# Patient Record
Sex: MALE | Race: BLACK OR AFRICAN AMERICAN | NOT HISPANIC OR LATINO | Employment: OTHER | URBAN - METROPOLITAN AREA
[De-identification: names, ages, dates, MRNs, and addresses within clinical notes are randomized per-mention and may not be internally consistent; named-entity substitution may affect disease eponyms.]

---

## 2017-01-06 ENCOUNTER — HISTORICAL (OUTPATIENT)
Dept: ADMINISTRATIVE | Facility: HOSPITAL | Age: 77
End: 2017-01-06

## 2018-01-17 ENCOUNTER — HISTORICAL (OUTPATIENT)
Dept: LAB | Facility: HOSPITAL | Age: 78
End: 2018-01-17

## 2018-05-17 ENCOUNTER — HISTORICAL (OUTPATIENT)
Dept: LAB | Facility: HOSPITAL | Age: 78
End: 2018-05-17

## 2018-09-25 ENCOUNTER — HISTORICAL (OUTPATIENT)
Dept: CARDIOLOGY | Facility: HOSPITAL | Age: 78
End: 2018-09-25

## 2018-09-25 LAB
ABS NEUT (OLG): 2.3 X10(3)/MCL (ref 2.1–9.2)
APTT PPP: 33.9 SECOND(S) (ref 24.8–36.9)
BASOPHILS # BLD AUTO: 0 X10(3)/MCL (ref 0–0.2)
BASOPHILS NFR BLD AUTO: 1 %
BUN SERPL-MCNC: 23 MG/DL (ref 7–18)
CALCIUM SERPL-MCNC: 9.4 MG/DL (ref 8.5–10.1)
CHLORIDE SERPL-SCNC: 106 MMOL/L (ref 98–107)
CO2 SERPL-SCNC: 26 MMOL/L (ref 21–32)
CREAT SERPL-MCNC: 1.57 MG/DL (ref 0.7–1.3)
CREAT/UREA NIT SERPL: 14.6
EOSINOPHIL # BLD AUTO: 0.4 X10(3)/MCL (ref 0–0.9)
EOSINOPHIL NFR BLD AUTO: 8 %
ERYTHROCYTE [DISTWIDTH] IN BLOOD BY AUTOMATED COUNT: 13.5 % (ref 11.5–17)
GLUCOSE SERPL-MCNC: 81 MG/DL (ref 74–106)
HCT VFR BLD AUTO: 43.3 % (ref 42–52)
HGB BLD-MCNC: 12.9 GM/DL (ref 14–18)
INR PPP: 1.01 (ref 0–1.27)
LYMPHOCYTES # BLD AUTO: 1.6 X10(3)/MCL (ref 0.6–4.6)
LYMPHOCYTES NFR BLD AUTO: 34 %
MCH RBC QN AUTO: 27.2 PG (ref 27–31)
MCHC RBC AUTO-ENTMCNC: 29.8 GM/DL (ref 33–36)
MCV RBC AUTO: 91.2 FL (ref 80–94)
MONOCYTES # BLD AUTO: 0.5 X10(3)/MCL (ref 0.1–1.3)
MONOCYTES NFR BLD AUTO: 10 %
NEUTROPHILS # BLD AUTO: 2.3 X10(3)/MCL (ref 1.4–7.9)
NEUTROPHILS NFR BLD AUTO: 48 %
PLATELET # BLD AUTO: 247 X10(3)/MCL (ref 130–400)
PMV BLD AUTO: 10.3 FL (ref 9.4–12.4)
POTASSIUM SERPL-SCNC: 4.1 MMOL/L (ref 3.5–5.1)
PROTHROMBIN TIME: 13.6 SECOND(S) (ref 12.2–14.7)
RBC # BLD AUTO: 4.75 X10(6)/MCL (ref 4.7–6.1)
SODIUM SERPL-SCNC: 142 MMOL/L (ref 136–145)
WBC # SPEC AUTO: 4.8 X10(3)/MCL (ref 4.5–11.5)

## 2018-12-02 ENCOUNTER — HOSPITAL ENCOUNTER (OUTPATIENT)
Dept: ADMINISTRATIVE | Facility: HOSPITAL | Age: 78
End: 2018-12-03
Attending: INTERNAL MEDICINE | Admitting: INTERNAL MEDICINE

## 2018-12-02 LAB
ABS NEUT (OLG): 1.73 X10(3)/MCL (ref 2.1–9.2)
ALBUMIN SERPL-MCNC: 3.5 GM/DL (ref 3.4–5)
ALBUMIN/GLOB SERPL: 1 {RATIO}
ALP SERPL-CCNC: 113 UNIT/L (ref 50–136)
ALT SERPL-CCNC: 12 UNIT/L (ref 12–78)
APTT PPP: 33.7 SECOND(S) (ref 24.8–36.9)
AST SERPL-CCNC: 15 UNIT/L (ref 15–37)
BASOPHILS # BLD AUTO: 0 X10(3)/MCL (ref 0–0.2)
BASOPHILS NFR BLD AUTO: 0 %
BILIRUB SERPL-MCNC: 0.2 MG/DL (ref 0.2–1)
BILIRUBIN DIRECT+TOT PNL SERPL-MCNC: 0.1 MG/DL (ref 0–0.2)
BILIRUBIN DIRECT+TOT PNL SERPL-MCNC: 0.1 MG/DL (ref 0–0.8)
BNP BLD-MCNC: 19 PG/ML (ref 0–125)
BUN SERPL-MCNC: 33 MG/DL (ref 7–18)
CALCIUM SERPL-MCNC: 8.7 MG/DL (ref 8.5–10.1)
CHLORIDE SERPL-SCNC: 109 MMOL/L (ref 98–107)
CO2 SERPL-SCNC: 22 MMOL/L (ref 21–32)
CREAT SERPL-MCNC: 1.68 MG/DL (ref 0.7–1.3)
EOSINOPHIL # BLD AUTO: 0.2 X10(3)/MCL (ref 0–0.9)
EOSINOPHIL NFR BLD AUTO: 5 %
ERYTHROCYTE [DISTWIDTH] IN BLOOD BY AUTOMATED COUNT: 16.5 % (ref 11.5–17)
GLOBULIN SER-MCNC: 3.6 GM/DL (ref 2.4–3.5)
GLUCOSE SERPL-MCNC: 80 MG/DL (ref 74–106)
HCT VFR BLD AUTO: 35.4 % (ref 42–52)
HGB BLD-MCNC: 10.5 GM/DL (ref 14–18)
INR PPP: 1 (ref 0–1.3)
LYMPHOCYTES # BLD AUTO: 2.3 X10(3)/MCL (ref 0.6–4.6)
LYMPHOCYTES NFR BLD AUTO: 49 %
MCH RBC QN AUTO: 26.4 PG (ref 27–31)
MCHC RBC AUTO-ENTMCNC: 29.7 GM/DL (ref 33–36)
MCV RBC AUTO: 88.9 FL (ref 80–94)
MONOCYTES # BLD AUTO: 0.4 X10(3)/MCL (ref 0.1–1.3)
MONOCYTES NFR BLD AUTO: 8 %
NEUTROPHILS # BLD AUTO: 1.73 X10(3)/MCL (ref 2.1–9.2)
NEUTROPHILS NFR BLD AUTO: 37 %
NRBC BLD AUTO-RTO: 0.4 % (ref 0–0.2)
PLATELET # BLD AUTO: 300 X10(3)/MCL (ref 130–400)
PMV BLD AUTO: 9.7 FL (ref 9.4–12.4)
POC TROPONIN: 0 NG/ML (ref 0–0.08)
POTASSIUM SERPL-SCNC: 4.4 MMOL/L (ref 3.5–5.1)
PROT SERPL-MCNC: 7.1 GM/DL (ref 6.4–8.2)
PROTHROMBIN TIME: 13.7 SECOND(S) (ref 12.2–14.7)
RBC # BLD AUTO: 3.98 X10(6)/MCL (ref 4.7–6.1)
SODIUM SERPL-SCNC: 140 MMOL/L (ref 136–145)
TROPONIN I SERPL-MCNC: <0.02 NG/ML (ref 0.02–0.49)
TROPONIN I SERPL-MCNC: <0.02 NG/ML (ref 0.02–0.49)
WBC # SPEC AUTO: 4.7 X10(3)/MCL (ref 4.5–11.5)

## 2018-12-03 LAB — TROPONIN I SERPL-MCNC: <0.02 NG/ML (ref 0.02–0.49)

## 2019-01-23 LAB — HEMOCCULT SP2 STL QL: NEGATIVE

## 2019-01-24 ENCOUNTER — HISTORICAL (OUTPATIENT)
Dept: LAB | Facility: HOSPITAL | Age: 79
End: 2019-01-24

## 2020-09-04 ENCOUNTER — HOSPITAL ENCOUNTER (OUTPATIENT)
Dept: NUTRITION | Facility: HOSPITAL | Age: 80
End: 2020-09-05
Attending: INTERNAL MEDICINE | Admitting: INTERNAL MEDICINE

## 2020-09-04 LAB
ABS NEUT (OLG): 1.73 X10(3)/MCL (ref 2.1–9.2)
ALBUMIN SERPL-MCNC: 3.9 GM/DL (ref 3.4–4.8)
ALBUMIN/GLOB SERPL: 1.3 RATIO (ref 1.1–2)
ALP SERPL-CCNC: 126 UNIT/L (ref 40–150)
ALT SERPL-CCNC: 9 UNIT/L (ref 0–55)
APPEARANCE, UA: CLEAR
AST SERPL-CCNC: 18 UNIT/L (ref 5–34)
BACTERIA SPEC CULT: ABNORMAL /HPF
BASOPHILS # BLD AUTO: 0 X10(3)/MCL (ref 0–0.2)
BASOPHILS NFR BLD AUTO: 0 %
BILIRUB SERPL-MCNC: 0.6 MG/DL
BILIRUB UR QL STRIP: NEGATIVE
BILIRUBIN DIRECT+TOT PNL SERPL-MCNC: 0.2 MG/DL (ref 0–0.5)
BILIRUBIN DIRECT+TOT PNL SERPL-MCNC: 0.4 MG/DL (ref 0–0.8)
BNP BLD-MCNC: <10 PG/ML
BUN SERPL-MCNC: 39.3 MG/DL (ref 8.4–25.7)
CALCIUM SERPL-MCNC: 9 MG/DL (ref 8.8–10)
CHLORIDE SERPL-SCNC: 107 MMOL/L (ref 98–107)
CK SERPL-CCNC: 182 U/L (ref 30–200)
CO2 SERPL-SCNC: 22 MMOL/L (ref 23–31)
COLOR UR: YELLOW
CREAT SERPL-MCNC: 2.32 MG/DL (ref 0.73–1.18)
EOSINOPHIL # BLD AUTO: 0.1 X10(3)/MCL (ref 0–0.9)
EOSINOPHIL NFR BLD AUTO: 2 %
ERYTHROCYTE [DISTWIDTH] IN BLOOD BY AUTOMATED COUNT: 13.2 % (ref 11.5–17)
GLOBULIN SER-MCNC: 3.1 GM/DL (ref 2.4–3.5)
GLUCOSE (UA): NEGATIVE
GLUCOSE SERPL-MCNC: 78 MG/DL (ref 82–115)
HCT VFR BLD AUTO: 41.8 % (ref 42–52)
HGB BLD-MCNC: 12.7 GM/DL (ref 14–18)
HGB UR QL STRIP: NEGATIVE
KETONES UR QL STRIP: ABNORMAL
LEUKOCYTE ESTERASE UR QL STRIP: NEGATIVE
LYMPHOCYTES # BLD AUTO: 1.7 X10(3)/MCL (ref 0.6–4.6)
LYMPHOCYTES NFR BLD AUTO: 43 %
MCH RBC QN AUTO: 28 PG (ref 27–31)
MCHC RBC AUTO-ENTMCNC: 30.4 GM/DL (ref 33–36)
MCV RBC AUTO: 92.1 FL (ref 80–94)
MONOCYTES # BLD AUTO: 0.4 X10(3)/MCL (ref 0.1–1.3)
MONOCYTES NFR BLD AUTO: 11 %
NEUTROPHILS # BLD AUTO: 1.73 X10(3)/MCL (ref 2.1–9.2)
NEUTROPHILS NFR BLD AUTO: 43 %
NITRITE UR QL STRIP: NEGATIVE
PH UR STRIP: 5 [PH] (ref 5–9)
PLATELET # BLD AUTO: 222 X10(3)/MCL (ref 130–400)
PMV BLD AUTO: 10.8 FL (ref 9.4–12.4)
POTASSIUM SERPL-SCNC: 4.8 MMOL/L (ref 3.5–5.1)
PROT SERPL-MCNC: 7 GM/DL (ref 5.8–7.6)
PROT UR QL STRIP: ABNORMAL
RBC # BLD AUTO: 4.54 X10(6)/MCL (ref 4.7–6.1)
RBC #/AREA URNS HPF: ABNORMAL /[HPF]
SARS-COV-2 RNA RESP QL NAA+PROBE: NOT DETECTED
SODIUM SERPL-SCNC: 139 MMOL/L (ref 136–145)
SP GR UR STRIP: 1.02 (ref 1–1.03)
SQUAMOUS EPITHELIAL, UA: ABNORMAL
TROPONIN I SERPL-MCNC: 0.01 NG/ML (ref 0–0.04)
UROBILINOGEN UR STRIP-ACNC: 1
WBC # SPEC AUTO: 4 X10(3)/MCL (ref 4.5–11.5)
WBC #/AREA URNS HPF: ABNORMAL /HPF

## 2020-09-05 LAB
BUN SERPL-MCNC: 33.2 MG/DL (ref 8.4–25.7)
CALCIUM SERPL-MCNC: 8.3 MG/DL (ref 8.8–10)
CHLORIDE SERPL-SCNC: 108 MMOL/L (ref 98–107)
CO2 SERPL-SCNC: 24 MMOL/L (ref 23–31)
CREAT SERPL-MCNC: 1.63 MG/DL (ref 0.73–1.18)
CREAT/UREA NIT SERPL: 20
GLUCOSE SERPL-MCNC: 92 MG/DL (ref 82–115)
POTASSIUM SERPL-SCNC: 4.8 MMOL/L (ref 3.5–5.1)
SODIUM SERPL-SCNC: 137 MMOL/L (ref 136–145)

## 2021-04-30 ENCOUNTER — HOSPITAL ENCOUNTER (OUTPATIENT)
Dept: NUTRITION | Facility: HOSPITAL | Age: 81
End: 2021-05-01
Attending: INTERNAL MEDICINE | Admitting: INTERNAL MEDICINE

## 2021-04-30 LAB
ABS NEUT (OLG): 2.08 X10(3)/MCL (ref 2.1–9.2)
ALBUMIN SERPL-MCNC: 4 GM/DL (ref 3.4–4.8)
ALBUMIN/GLOB SERPL: 1.3 RATIO (ref 1.1–2)
ALP SERPL-CCNC: 124 UNIT/L (ref 40–150)
ALT SERPL-CCNC: 12 UNIT/L (ref 0–55)
AST SERPL-CCNC: 23 UNIT/L (ref 5–34)
BASOPHILS # BLD AUTO: 0 X10(3)/MCL (ref 0–0.2)
BASOPHILS NFR BLD AUTO: 0 %
BILIRUB SERPL-MCNC: 0.5 MG/DL
BILIRUBIN DIRECT+TOT PNL SERPL-MCNC: 0.2 MG/DL (ref 0–0.8)
BILIRUBIN DIRECT+TOT PNL SERPL-MCNC: 0.3 MG/DL (ref 0–0.5)
BNP BLD-MCNC: 24 PG/ML (ref 0–100)
BUN SERPL-MCNC: 20.8 MG/DL (ref 8.4–25.7)
CALCIUM SERPL-MCNC: 9.2 MG/DL (ref 8.8–10)
CHLORIDE SERPL-SCNC: 111 MMOL/L (ref 98–107)
CO2 SERPL-SCNC: 23 MMOL/L (ref 23–31)
CREAT SERPL-MCNC: 1.46 MG/DL (ref 0.73–1.18)
EOSINOPHIL # BLD AUTO: 0.1 X10(3)/MCL (ref 0–0.9)
EOSINOPHIL NFR BLD AUTO: 2 %
ERYTHROCYTE [DISTWIDTH] IN BLOOD BY AUTOMATED COUNT: 13.2 % (ref 11.5–17)
GLOBULIN SER-MCNC: 3.1 GM/DL (ref 2.4–3.5)
GLUCOSE SERPL-MCNC: 77 MG/DL (ref 82–115)
HCT VFR BLD AUTO: 45.3 % (ref 42–52)
HGB BLD-MCNC: 13.6 GM/DL (ref 14–18)
LYMPHOCYTES # BLD AUTO: 1.8 X10(3)/MCL (ref 0.6–4.6)
LYMPHOCYTES NFR BLD AUTO: 41 %
MCH RBC QN AUTO: 27.8 PG (ref 27–31)
MCHC RBC AUTO-ENTMCNC: 30 GM/DL (ref 33–36)
MCV RBC AUTO: 92.6 FL (ref 80–94)
MONOCYTES # BLD AUTO: 0.4 X10(3)/MCL (ref 0.1–1.3)
MONOCYTES NFR BLD AUTO: 8 %
NEUTROPHILS # BLD AUTO: 2.08 X10(3)/MCL (ref 2.1–9.2)
NEUTROPHILS NFR BLD AUTO: 48 %
PLATELET # BLD AUTO: 198 X10(3)/MCL (ref 130–400)
PMV BLD AUTO: 10.6 FL (ref 9.4–12.4)
POC TROPONIN: 0.06 NG/ML (ref 0–0.08)
POTASSIUM SERPL-SCNC: 4.3 MMOL/L (ref 3.5–5.1)
PROT SERPL-MCNC: 7.1 GM/DL (ref 5.8–7.6)
RBC # BLD AUTO: 4.89 X10(6)/MCL (ref 4.7–6.1)
SARS-COV-2 AG RESP QL IA.RAPID: NEGATIVE
SODIUM SERPL-SCNC: 144 MMOL/L (ref 136–145)
TROPONIN I SERPL-MCNC: 0.02 NG/ML (ref 0–0.04)
WBC # SPEC AUTO: 4.3 X10(3)/MCL (ref 4.5–11.5)

## 2021-05-01 LAB
BUN SERPL-MCNC: 21.1 MG/DL (ref 8.4–25.7)
CALCIUM SERPL-MCNC: 8.8 MG/DL (ref 8.8–10)
CHLORIDE SERPL-SCNC: 107 MMOL/L (ref 98–107)
CK MB SERPL-MCNC: 0.8 NG/ML
CK MB SERPL-MCNC: 1 NG/ML
CK SERPL-CCNC: 130 U/L (ref 30–200)
CK SERPL-CCNC: 131 U/L (ref 30–200)
CO2 SERPL-SCNC: 22 MMOL/L (ref 23–31)
CREAT SERPL-MCNC: 1.43 MG/DL (ref 0.73–1.18)
CREAT/UREA NIT SERPL: 15
GLUCOSE SERPL-MCNC: 57 MG/DL (ref 82–115)
POTASSIUM SERPL-SCNC: 4 MMOL/L (ref 3.5–5.1)
SODIUM SERPL-SCNC: 139 MMOL/L (ref 136–145)
TROPONIN I SERPL-MCNC: 0.01 NG/ML (ref 0–0.04)
TROPONIN I SERPL-MCNC: 0.01 NG/ML (ref 0–0.04)

## 2021-10-28 ENCOUNTER — HOSPITAL ENCOUNTER (OUTPATIENT)
Dept: MEDSURG UNIT | Facility: HOSPITAL | Age: 81
End: 2021-10-30
Attending: INTERNAL MEDICINE | Admitting: INTERNAL MEDICINE

## 2021-10-28 LAB
ABS NEUT (OLG): 1.62 X10(3)/MCL (ref 2.1–9.2)
ALBUMIN SERPL-MCNC: 4 GM/DL (ref 3.4–4.8)
ALBUMIN/GLOB SERPL: 1.4 RATIO (ref 1.1–2)
ALP SERPL-CCNC: 115 UNIT/L (ref 40–150)
ALT SERPL-CCNC: 11 UNIT/L (ref 0–55)
AST SERPL-CCNC: 20 UNIT/L (ref 5–34)
BASOPHILS # BLD AUTO: 0 X10(3)/MCL (ref 0–0.2)
BASOPHILS NFR BLD AUTO: 0 %
BILIRUB SERPL-MCNC: 0.9 MG/DL
BILIRUBIN DIRECT+TOT PNL SERPL-MCNC: 0.3 MG/DL (ref 0–0.5)
BILIRUBIN DIRECT+TOT PNL SERPL-MCNC: 0.6 MG/DL (ref 0–0.8)
BNP BLD-MCNC: 114.1 PG/ML
BUN SERPL-MCNC: 13.1 MG/DL (ref 8.4–25.7)
CALCIUM SERPL-MCNC: 9.3 MG/DL (ref 8.7–10.5)
CHLORIDE SERPL-SCNC: 111 MMOL/L (ref 98–107)
CO2 SERPL-SCNC: 23 MMOL/L (ref 23–31)
CREAT SERPL-MCNC: 1.39 MG/DL (ref 0.73–1.18)
EOSINOPHIL # BLD AUTO: 0.2 X10(3)/MCL (ref 0–0.9)
EOSINOPHIL NFR BLD AUTO: 4 %
ERYTHROCYTE [DISTWIDTH] IN BLOOD BY AUTOMATED COUNT: 14 % (ref 11.5–17)
GLOBULIN SER-MCNC: 2.9 GM/DL (ref 2.4–3.5)
GLUCOSE SERPL-MCNC: 82 MG/DL (ref 82–115)
HCT VFR BLD AUTO: 45.9 % (ref 42–52)
HGB BLD-MCNC: 13.6 GM/DL (ref 14–18)
LYMPHOCYTES # BLD AUTO: 1.8 X10(3)/MCL (ref 0.6–4.6)
LYMPHOCYTES NFR BLD AUTO: 46 %
MCH RBC QN AUTO: 27.7 PG (ref 27–31)
MCHC RBC AUTO-ENTMCNC: 29.6 GM/DL (ref 33–36)
MCV RBC AUTO: 93.5 FL (ref 80–94)
MONOCYTES # BLD AUTO: 0.4 X10(3)/MCL (ref 0.1–1.3)
MONOCYTES NFR BLD AUTO: 10 %
NEUTROPHILS # BLD AUTO: 1.62 X10(3)/MCL (ref 2.1–9.2)
NEUTROPHILS NFR BLD AUTO: 40 %
PLATELET # BLD AUTO: 183 X10(3)/MCL (ref 130–400)
PMV BLD AUTO: 10.3 FL (ref 9.4–12.4)
POC TROPONIN: 0.01 NG/ML (ref 0–0.08)
POTASSIUM SERPL-SCNC: 4.6 MMOL/L (ref 3.5–5.1)
PROT SERPL-MCNC: 6.9 GM/DL (ref 5.8–7.6)
RBC # BLD AUTO: 4.91 X10(6)/MCL (ref 4.7–6.1)
SARS-COV-2 AG RESP QL IA.RAPID: NEGATIVE
SODIUM SERPL-SCNC: 144 MMOL/L (ref 136–145)
TROPONIN I SERPL-MCNC: 0.01 NG/ML (ref 0–0.04)
TROPONIN I SERPL-MCNC: 0.02 NG/ML (ref 0–0.04)
TROPONIN I SERPL-MCNC: 0.02 NG/ML (ref 0–0.04)
TROPONIN I SERPL-MCNC: 0.03 NG/ML (ref 0–0.04)
WBC # SPEC AUTO: 4 X10(3)/MCL (ref 4.5–11.5)

## 2021-10-29 LAB
ABS NEUT (OLG): 1.68 X10(3)/MCL (ref 2.1–9.2)
ALBUMIN SERPL-MCNC: 3.3 GM/DL (ref 3.4–4.8)
ALBUMIN/GLOB SERPL: 1.3 RATIO (ref 1.1–2)
ALP SERPL-CCNC: 97 UNIT/L (ref 40–150)
ALT SERPL-CCNC: 7 UNIT/L (ref 0–55)
AST SERPL-CCNC: 16 UNIT/L (ref 5–34)
BASOPHILS # BLD AUTO: 0 X10(3)/MCL (ref 0–0.2)
BASOPHILS NFR BLD AUTO: 0 %
BILIRUB SERPL-MCNC: 0.7 MG/DL
BILIRUBIN DIRECT+TOT PNL SERPL-MCNC: 0.3 MG/DL (ref 0–0.5)
BILIRUBIN DIRECT+TOT PNL SERPL-MCNC: 0.4 MG/DL (ref 0–0.8)
BUN SERPL-MCNC: 13.4 MG/DL (ref 8.4–25.7)
CALCIUM SERPL-MCNC: 8.6 MG/DL (ref 8.7–10.5)
CHLORIDE SERPL-SCNC: 113 MMOL/L (ref 98–107)
CO2 SERPL-SCNC: 20 MMOL/L (ref 23–31)
CREAT SERPL-MCNC: 1.42 MG/DL (ref 0.73–1.18)
EOSINOPHIL # BLD AUTO: 0.1 X10(3)/MCL (ref 0–0.9)
EOSINOPHIL NFR BLD AUTO: 2 %
ERYTHROCYTE [DISTWIDTH] IN BLOOD BY AUTOMATED COUNT: 14 % (ref 11.5–17)
GLOBULIN SER-MCNC: 2.6 GM/DL (ref 2.4–3.5)
GLUCOSE SERPL-MCNC: 90 MG/DL (ref 82–115)
HCT VFR BLD AUTO: 39.5 % (ref 42–52)
HGB BLD-MCNC: 12.2 GM/DL (ref 14–18)
LYMPHOCYTES # BLD AUTO: 1.1 X10(3)/MCL (ref 0.6–4.6)
LYMPHOCYTES NFR BLD AUTO: 35 %
MCH RBC QN AUTO: 28.5 PG (ref 27–31)
MCHC RBC AUTO-ENTMCNC: 30.9 GM/DL (ref 33–36)
MCV RBC AUTO: 92.3 FL (ref 80–94)
MONOCYTES # BLD AUTO: 0.4 X10(3)/MCL (ref 0.1–1.3)
MONOCYTES NFR BLD AUTO: 11 %
NEUTROPHILS # BLD AUTO: 1.68 X10(3)/MCL (ref 2.1–9.2)
NEUTROPHILS NFR BLD AUTO: 51 %
PLATELET # BLD AUTO: 132 X10(3)/MCL (ref 130–400)
PMV BLD AUTO: 11.3 FL (ref 9.4–12.4)
POTASSIUM SERPL-SCNC: 4 MMOL/L (ref 3.5–5.1)
PROT SERPL-MCNC: 5.9 GM/DL (ref 5.8–7.6)
RBC # BLD AUTO: 4.28 X10(6)/MCL (ref 4.7–6.1)
RESTICK: NORMAL
SODIUM SERPL-SCNC: 139 MMOL/L (ref 136–145)
TROPONIN I SERPL-MCNC: 0.03 NG/ML (ref 0–0.04)
WBC # SPEC AUTO: 3.3 X10(3)/MCL (ref 4.5–11.5)

## 2021-12-03 LAB — EST CREAT CLEARANCE SER (OHS): 47.05 ML/MIN

## 2022-01-28 ENCOUNTER — HISTORICAL (OUTPATIENT)
Dept: ADMINISTRATIVE | Facility: HOSPITAL | Age: 82
End: 2022-01-28

## 2022-02-05 ENCOUNTER — HISTORICAL (OUTPATIENT)
Dept: ADMINISTRATIVE | Facility: HOSPITAL | Age: 82
End: 2022-02-05

## 2022-04-29 NOTE — ED PROVIDER NOTES
Patient:   Cony Roman            MRN: [id number]            FIN: [id number]               Age:   80 years     Sex:  Male     :  1940   Associated Diagnoses:   Generalized weakness; Fasting hypoglycemia; Acute on chronic renal failure   Author:   Yanci Lewis MD      Basic Information   Time seen: Date & time 2020 13:18:00.   History source: Patient, daughter.   Arrival mode: Private vehicle, walking.   History limitation: None.   Additional information: Chief Complaint from Nursing Triage Note : Chief Complaint   2020 13:15 CDT       Chief Complaint           pt reports pov c/o generalized weakness x4 days. pt reports working in yard monday, then no energy the following day. went to urgent care yest, bld work done. called today to come to ER for elevated D-dimer. denies pain, SOB, N/V/D, fever  .      History of Present Illness   The patient presents with SOrT note: Elderly male with generalized weakness seen by primary care physician with outpatient lab work yesterday notified of elevated d-dimer sent to emergency department for further evaluation today.  DAYTON BARNEY and I, Dr. Lewis, assumed care of this patient at 1650.    79 y/o male with hx of HTN and HLD presents to ED with complaints of generalized weakness x4 days. Patient reports working in his yard on Monday and since has been fatigued. He was seen by an Urgent Care or PCP who ordered routine labs as well as a d-dimer that was elevated therefore he was diverted to the ED. Pt denies SOB, chest pain, nausea, vomiting, diarrhea, and a fever.   .  The onset was 4  days ago.  The course/duration of symptoms is constant.  Location: generalized. The character of symptoms is weakness.  The degree at onset was moderate.  The degree at present is moderate.  Risk factors consist of hypertension and HLD.  Therapy today: doctor's office visit.  Associated symptoms: denies chest pain, denies nausea, denies vomiting, denies shortness  of breath and denies fever.  Additional history: none.     The patient presents with abnormal lab test.        Review of Systems   Constitutional symptoms:  No fever, no chills, no sweats   Skin symptoms:  No rash, no petechiae.    Eye symptoms:  Vision unchanged, no pain, no discharge, no icterus, no diplopia, no blurred vision, no blindness.    ENMT symptoms:  No ear pain, no sore throat, no nasal congestion, no sinus pain.    Respiratory symptoms:  No shortness of breath, no orthopnea, no cough, no hemoptysis, no stridor, no wheezing.    Cardiovascular symptoms:  No chest pain, no palpitations, no syncope, no diaphoresis, no peripheral edema.    Gastrointestinal symptoms:  No abdominal pain, no nausea, no vomiting, no diarrhea, no constipation, no rectal bleeding, no rectal pain.    Genitourinary symptoms:  No dysuria, no hematuria.    Musculoskeletal symptoms:  No back pain, no Muscle pain.    Neurologic symptoms:  No headache, no dizziness, no altered level of consciousness, no numbness, no tingling   Psychiatric symptoms:  Negative except as documented in HPI.   Endocrine symptoms:  Negative except as documented in HPI.   Hematologic/Lymphatic symptoms:  Negative except as documented in HPI      Health Status   Allergies: No known allergies.   Medications:  (Selected)   Prescriptions  Prescribed  isosorbide MONOnitrate 60 mg oral tablet, Extended Release: 60 mg = 1 tab(s), Oral, qAM, # 30 tab(s), 5 Refill(s)  lisinopril 20 mg oral tablet: 20 mg = 1 tab(s), Oral, Daily, # 30 tab(s), 5 Refill(s)  Documented Medications  Documented  ATORVASTATIN 20 MG TABLET: 20 mg = 1 tab(s), Oral, Daily  Aspir 81 oral delayed release tablet: 81 mg = 1 tab(s), Oral, Daily, # 30 tab(s), 0 Refill(s)  METOPROLOL SUCC ER 25 MG TAB: 25 mg = 1 tab(s), Oral, Daily  NITROGLYCERIN 0.4 MG TABLET SL: 1 tab, SL, q5min, PRN PRN chest pain, x3 doses.      Past Medical/ Family/ Social History   Medical history:    Resolved  Tobacco user  ([id number]): Onset in 1952 at 12 years.  Resolved on 12/1/2017 at 77 years.  Hernia (553.9):  Resolved.  HTN (hypertension) (401.9):  Resolved.  Hyperlipidemia ([id number]):  Resolved.  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  Comments:  2/2/2016 CST 8:07 CST - Jasmine SINGER , Massiel Gilbert  right.   Surgical history:    Abdominal Aortic Aneurysmectomy (.) on 9/27/2018 at 78 Years.  Comments:  9/27/2018 12:16 CDT - Rm SINGER, Jaxon ELY  auto-populated from documented surgical case  Nephrectomy (Right) on 2/2/2016 at 75 Years.  Comments:  2/2/2016 13:46 CST - Kwasi SINGER, Heather RENE  auto-populated from documented surgical case  Hernia repair ([id number]).  Lumpectomy ([id number]).  Colonoscopy ([id number])..   Family history:    No family history items have been selected or recorded..   Social history: Alcohol use: former, Tobacco use: Former.      Physical Examination               Vital Signs   Vital Signs   9/4/2020 13:15 CDT       Temperature Oral          36.7 DegC                             Temperature Oral (calculated)             98.06 DegF                             Peripheral Pulse Rate     57 bpm  LOW                             Respiratory Rate          19 br/min                             SpO2                      99 %                             Oxygen Therapy            Room air                             Systolic Blood Pressure   118 mmHg                             Diastolic Blood Pressure  64 mmHg  .               Per nurse's notes.   Basic Oxygen Information   9/4/2020 16:53 CDT       SpO2                      100 %                             Oxygen Therapy            Room air  .   General:  Alert, no acute distress, generalized weakness.    Neurological:  Alert and oriented to person, place, time, and situation, No focal neurological deficit observed   Skin:  Warm, dry, intact   Head:  Normocephalic, atraumatic.    Neck:  Supple, trachea midline, no tenderness.    Eye:   Pupils are equal, round and reactive to light, extraocular movements are intact   Respiratory:  Lungs are clear to auscultation, respirations are non-labored   Cardiovascular:  Regular rate and rhythm, No murmur, Normal peripheral perfusion.    Back:  Nontender, Normal range of motion   Musculoskeletal:  Normal ROM, normal strength.    Gastrointestinal:  Soft, Nontender   Psychiatric:  Cooperative, appropriate mood & affect.       Medical Decision Making   Differential Diagnosis:  Electrolyte imbalance, hypokalemia, hyponatremia, drug toxicity, renal insufficiency, renal failure, metabolic acidosis, dysrhythmia, gastrointestinal bleed, atrial fibrillation, lab error.    Differential Diagnosis:  Abdominal pain, acute myocardial infarction, dehydration, electrolyte imbalance, weakness, pneumonia, urinary tract infection, viral syndrome, medication reaction, heat cramps.    Rationale:  pt hwo has been weak for about 1 week, saw pcp and had routine labs performed, was notified of elevated d dimer. has no evidence to suggest vte, is not tachycardic, tachypneic, hypoxic or complaining of chest pain or shortness of breath. no calf pain or swelling, possibly nonspecific elevation vs elevation related to possible covid-19. is in acute renal failure which would preclude cta for pe. will order venous us bilateral le to r/odvt.   Documents reviewed:  Emergency department nurses' notes.   Orders  Launch Order Profile (Selected)   Inpatient Orders  Ordered  Blood Glucose Monitoring POC: 09/04/20 15:26:00 CDT, Once-Unscheduled, 09/04/20 15:26:00 CDT  NS 1,000 mL: 1,000 mL, 1,000 mL, IV, 500 mL/hr, start date 09/04/20 17:07:00 CDT  Patient Isolation: 09/04/20 13:20:13 CDT, Contact Precautions, Constant Indicator  Patient Isolation: 09/04/20 13:20:13 CDT, Droplet Precautions, Constant Indicator  Ordered (Dispatched)  Urinalysis with Reflex: Stat collect, Urine, 09/04/20 13:19:00 CDT, Stop date 09/04/20 13:20:00 CDT, Nurse collect,  Print Label By Order Location  Ordered (In-Lab)  COVID-19 PCR - Inpatient only [facility]: Now collect, Nasopharyngeal Swab, 09/04/20 13:19:00 CDT, Stop date 09/04/20 13:20:00 CDT, Nurse collect  Completed  Automated Diff: NOW collect, 09/04/20 13:27:00 CDT, Blood, Collected, Stop date 09/04/20 13:27:00 CDT, Lab Collect, Print Label By Order Location, 09/04/20 13:19:00 CDT  BNP: STAT collect, 09/04/20 13:27:58 CDT, BLOOD, Collected, Stop date 09/04/20 13:27:00 CDT, Lab Collect  CBC w/ Auto Diff: NOW collect, 09/04/20 13:27:58 CDT, BLOOD, Collected, Stop date 09/04/20 13:27:00 CDT, Lab Collect  CMP: STAT collect, 09/04/20 13:27:58 CDT, BLOOD, Collected, Stop date 09/04/20 13:27:00 CDT, Lab Collect  EKG Adult 12 Lead: 09/04/20 13:18:00 CDT, Stat, Once, 09/04/20 13:18:00 CDT, Patient Bed, Patient Has IV, Standard Precautions, -1, -1, 09/04/20 13:18:00 CDT  Estimated Glomerular Filtration Rate: STAT collect, 09/04/20 13:27:00 CDT, Blood, Collected, Stop date 09/04/20 13:27:00 CDT, Lab Collect, Print Label By Order Location, 09/04/20 13:19:00 CDT  Troponin-I: STAT collect, 09/04/20 13:27:58 CDT, BLOOD, Collected, Stop date 09/04/20 13:27:00 CDT, Lab Collect  XR Chest 2 Views: Stat, 09/04/20 13:19:00 CDT, Other (please specify), elevated D-dimer, None, Patient Bed, Patient Has IV?, Rad Type, Not Scheduled, 09/04/20 13:19:00 CDT.   Electrocardiogram:  Time 9/4/2020 13:22:00, rate 57, sinus bradycardia with nonspecific st abnormality consistent with likely early repolarization.    Results review:  Lab results : Lab View   9/4/2020 13:27 CDT       Sodium Lvl                139 mmol/L                             Potassium Lvl             4.8 mmol/L                             Chloride                  107 mmol/L                             CO2                       22 mmol/L  LOW                             Calcium Lvl               9.0 mg/dL                             Glucose Lvl               78 mg/dL  LOW                              BUN                       39.3 mg/dL  HI                             Creatinine                2.32 mg/dL  HI                             eGFR-AA                   35  NA                             eGFR-GRAHAM                  29  NA                             Bili Total                0.6 mg/dL                             Bili Direct               0.2 mg/dL                             Bili Indirect             0.40 mg/dL                             AST                       18 unit/L                             ALT                       9 unit/L                             Alk Phos                  126 unit/L                             Total Protein             7.0 gm/dL                             Albumin Lvl               3.9 gm/dL                             Globulin                  3.1 gm/dL                             A/G Ratio                 1.3 ratio                             BNP                       <10.0 pg/mL                             Troponin-I                0.011 ng/mL                             WBC                       4.0 x10(3)/mcL  LOW                             RBC                       4.54 x10(6)/mcL  LOW                             Hgb                       12.7 gm/dL  LOW                             Hct                       41.8 %  LOW                             Platelet                  222 x10(3)/mcL                             MCV                       92.1 fL                             MCH                       28.0 pg                             MCHC                      30.4 gm/dL  LOW                             RDW                       13.2 %                             MPV                       10.8 fL                             Abs Neut                  1.73 x10(3)/mcL  LOW                             Neutro Auto               43 %  NA                             Lymph Auto                43 %  NA                             Mono Auto                 11 %  NA                              Eos Auto                  2 %  NA                             Abs Eos                   0.1 x10(3)/mcL                             Basophil Auto             0 %  NA                             Abs Neutro                1.73 x10(3)/mcL  LOW                             Abs Lymph                 1.7 x10(3)/mcL                             Abs Mono                  0.4 x10(3)/mcL                             Abs Baso                  0.0 x10(3)/mcL  .   Radiology results:  Rad Results (ST)  < 12 hrs   Accession: [id number]  Order: XR Chest 2 Views  Report Dt/Tm: 09/04/2020 13:57  Report:   Clinical History  Elevated d-dimer     Technique  2 views of the chest.     Comparison  No prior imaging available for comparison.     Findings  Lungs are clear with no visualized focal airspace opacity.  The trachea appears midline.  The cardiomediastinal silhouette is within normal limits.  There is no evidence of pneumothorax or pleural effusion.  Visualized abdomen, soft tissues, and osseous structures are  unremarkable.     Impression  No acute cardiopulmonary process.      .   Notes:  us negative for dvt bilaterally.      Reexamination/ Reevaluation   Time: 9/4/2020 17:53:00 .   Notes: + orthostatics, anorexia with hypoglycemia, acute on chronic renal failure. will give ivf, pt does not feel well enough to go home and is not eating or drinking well enough at this point to stress po hydration and recheck. suspect possible covid-19 infection given reported elevated d dimer, anorexia, fatigue and leukopenia. does not have respiratory or gi symptoms it seems. covid is pending.      Impression and Plan   Diagnosis   Generalized weakness (EMY44-AR R53.1)   Fasting hypoglycemia (QEE86-WT E16.1)   Acute on chronic renal failure (AQQ43-QP N17.9)      Calls-Consults   -  9/4/2020 19:05:00 , Zulema Huynh    Plan   Condition: Improved.    Disposition: Admit time  9/4/2020 19:05:00, Place in Observation  Telemetry Unit.    Counseled: Patient, Regarding diagnosis, Regarding diagnostic results, Regarding treatment plan, Patient indicated understanding of instructions.    Notes: I, Heena Corona, acted solely as a scribe for and in the presence of Dr. Lewis who performed the service. .       Addendum   I, Yanci Lewis MD, performed the history, physical examination, MDM and procedures as above and agree with the scribe's documentation

## 2022-04-29 NOTE — DISCHARGE SUMMARY
Patient:   Cony Roman            MRN: [id number]            FIN: [id number]               Age:   80 years     Sex:  Male     :  1940   Associated Diagnoses:   None   Author:   Aurora Ellis      Please see H&P from today for discharge summary

## 2022-04-29 NOTE — ED PROVIDER NOTES
"   Patient:   Cony Roman            MRN: [id number]            FIN: [id number]               Age:   78 years     Sex:  Male     :  1940   Associated Diagnoses:   Chest pain   Author:   Foster SYED, Matt Borjas      Basic Information   Time seen: Date & time 2018 06:16:00.   History source: Patient.   Arrival mode: Private vehicle.   History limitation: None.   Additional information: Patient's physician(s): Doreen KURTZ MD, Chucky BRIGGS, Nancy SYED, Mynor ALONSO, Chief Complaint from Nursing Triage Note : Chief Complaint   2018 4:22 CST       Chief Complaint           c/o L chest pain and SOB since . denies n/v/d, fever, cough  .      History of Present Illness   The patient presents with chest pain and 77 yo male with Hx of HTN and HLD presents with chest pain for 7 hours. Pt states his tight "heart pain", localized to the left side of his chest, came on last night at 11:00 PM while he was "helping [his] old lady with her medications". The pain lasted 1 hour and the Pt then went to sleep. He awoke this morning with mild chest pain and decided to drive himself to the ED here. He did not have chest pain while driving. Pt admits to SOB for the couple of months, but denies any SOB this morning. He did not take ASA this morning. He reports having weekly-to-monthly episodes of chest pain for the past couple of years; he says he had an angiogram performed by Dr. Cruz last year which was clear, with no blockages. Pt is a former smoker..  The onset was 7  hours ago.  The course/duration of symptoms is episodic: 2  total episodes.  Location: Left chest. Radiating pain: none. The character of symptoms is tightness.  The degree at onset was moderate.  The degree at maximum was moderate.  The degree at present is none.  The exacerbating factor is none.  The relieving factor is none.  Risk factors consist of hypertension, smoking and hyperlipidemia.  Prior episodes: angina.  Therapy today None.  " Associated symptoms: chest pain and denies shortness of breath.        Review of Systems   Constitutional symptoms:  No fever, no chills, no sweats, no weakness, no fatigue   Skin symptoms:  No rash, no lesion.    Respiratory symptoms:  Shortness of breathNo cough, no hemoptysis   Cardiovascular symptoms:  Chest pain, left chest, acute, intermittent, tightnessNo palpitations, no syncope, no peripheral edema.    Gastrointestinal symptoms:  No abdominal pain, no nausea, no vomiting, no diarrhea   Genitourinary symptoms:  No dysuria, no hematuria, no testicular pain.    Musculoskeletal symptoms:  No back pain   Neurologic symptoms:  No headache, no dizziness, no altered level of consciousness, no numbness, no tingling, no weakness.              Additional review of systems information: All other systems reviewed and otherwise negative.      Health Status   Allergies: No known allergies.   Medications:  (Selected)   Documented Medications  Documented  ATORVASTATIN 20 MG TABLET: 20 mg = 1 tab(s), Oral, Daily  Aspir 81 oral delayed release tablet: 81 mg = 1 tab(s), Oral, Daily, # 30 tab(s), 0 Refill(s)  ISOSORBIDE MONONIT ER 30 MG TB: 1 tab, Oral, Daily  LISINOPRIL-HCTZ 20-25 MG TAB: 1 tab(s), Oral, Daily  METOPROLOL SUCC ER 25 MG TAB: 25 mg = 1 tab(s), Oral, Daily  NITROGLYCERIN 0.4 MG TABLET SL: 1 tab, SL, q5min, PRN PRN chest pain, x3 doses.      Past Medical/ Family/ Social History   Medical history:    Resolved  HTN (hypertension) (401.9):  Resolved.  Hyperlipidemia ([id number]):  Resolved.  Hernia (553.9):  Resolved.  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  Comments:  2/2/2016 CST 8:07 CST - Jasmine SINGER , Massiel Gilbert  right,   Liver aneurysm.   Surgical history:    Abdominal Aortic Aneurysmectomy (.) on 9/27/2018 at 78 Years.  Comments:  9/27/2018 12:16 - Rm SINGER, Jaxon ELY  auto-populated from documented surgical case  Nephrectomy (Right) on 2/2/2016 at 75 Years.  Comments:  2/2/2016 13:46  Gabriel Villalpando RN, Heather RENE  auto-populated from documented surgical case  Hernia repair ([id number]).  Lumpectomy ([id number]).  Colonoscopy ([id number])..   Family history: Not significant.   Social history: Alcohol use: past, Tobacco use: Quit 1 years ago, Drug use: Denies.      Physical Examination               Vital Signs   Vital Signs   12/2/2018 4:22 CST       Temperature Oral          36.7 DegC                             Temperature Oral (calculated)             98.06 DegF                             Peripheral Pulse Rate     54 bpm  LOW                             Respiratory Rate          19 br/min                             SpO2                      99 %                             Oxygen Therapy            Room air                             Systolic Blood Pressure   107 mmHg                             Diastolic Blood Pressure  67 mmHg  .   Measurements   12/2/2018 4:22 CST       Weight Dosing             68 kg                             Weight Measured and Calculated in Lbs     149.91 lb                             Weight Estimated          68 kg                             Height/Length Dosing      172 cm                             Height/Length Estimated   172 cm                             Body Mass Index Estimated 22.99 kg/m2  .               Per nurse's notes.   Basic Oxygen Information   12/2/2018 4:22 CST       SpO2                      99 %                             Oxygen Therapy            Room air  .   General:  No acute distress   Neurological:  Alert and oriented to person, place, time, and situation, No focal neurological deficit observed, CN II-XII intact, normal sensory observed, normal motor observed, normal speech observed, normal coordination observed.    Skin:  Warm, dry, no rash   Neck:  Supple   Cardiovascular:  Regular rate and rhythm, No murmur, Normal peripheral perfusion, No edema   Respiratory:  Breath sounds are equal, Symmetrical chest wall expansion, Respirations:  Regular, Breath sounds: Clear.    Chest wall:  No tenderness, No deformity.    Back:  Nontender, Normal range of motion   Musculoskeletal:  No swelling, no deformity   Gastrointestinal:  Soft, Nontender, Non distended, Normal bowel sounds   Psychiatric:  Cooperative, appropriate mood & affect      Medical Decision Making   Differential Diagnosis:  Unstable angina, angina, atypical chest pain, pneumonia.    Documents reviewed:  Emergency department nurses' notes.   Electrocardiogram:  Time 12/2/2018 04:28:00, rate 54, no ectopy, normal OK & QRS intervals, EP Interp, The Rhythm is sinus.  , The Axis is normal.  , STT segments No ST changes, T wave Peaked, V4, , V5.    Results review:  Lab results : Lab View   12/2/2018 4:48 CST       POC Troponin              0.00 ng/mL    12/2/2018 4:37 CST       Sodium Lvl                140 mmol/L                             Potassium Lvl             4.4 mmol/L                             Chloride                  109 mmol/L  HI                             CO2                       22.0 mmol/L                             Calcium Lvl               8.7 mg/dL                             Glucose Lvl               80 mg/dL                             BUN                       33.0 mg/dL  HI                             Creatinine                1.68 mg/dL  HI                             eGFR-AA                   51 mL/min/1.73 m2  NA                             eGFR-GRAHAM                  42 mL/min/1.73 m2  NA                             Bili Total                0.2 mg/dL                             Bili Direct               0.10 mg/dL                             Bili Indirect             0.10 mg/dL                             AST                       15 unit/L                             ALT                       12 unit/L                             Alk Phos                  113 unit/L                             Total Protein             7.1 gm/dL                             Albumin  Lvl               3.50 gm/dL                             Globulin                  3.60 gm/dL  HI                             A/G Ratio                 1.0  NA                             BNP                       19 pg/mL                             Troponin-I                <0.02 ng/mL                             WBC                       4.7 x10(3)/mcL                             RBC                       3.98 x10(6)/mcL  LOW                             Hgb                       10.5 gm/dL  LOW                             Hct                       35.4 %  LOW                             Platelet                  300 x10(3)/mcL                             MCV                       88.9 fL                             MCH                       26.4 pg  LOW                             MCHC                      29.7 gm/dL  LOW                             RDW                       16.5 %                             MPV                       9.7 fL                             Abs Neut                  1.73 x10(3)/mcL  LOW                             Neutro Auto               37 %  NA                             Lymph Auto                49 %  NA                             Mono Auto                 8 %  NA                             Eos Auto                  5 %  NA                             Abs Eos                   0.2 x10(3)/mcL                             Basophil Auto             0 %  NA                             Abs Neutro                1.73 x10(3)/mcL  LOW                             Abs Lymph                 2.3 x10(3)/mcL                             Abs Mono                  0.4 x10(3)/mcL                             Abs Baso                  0.0 x10(3)/mcL                             NRBC%                     0.4 %  HI  .      Reexamination/ Reevaluation   Vital signs   Basic Oxygen Information   12/2/2018 6:00 CST       SpO2                      98 %                             Oxygen Therapy             Room air    12/2/2018 5:00 CST       SpO2                      99 %                             Oxygen Therapy            Room air    12/2/2018 4:22 CST       SpO2                      99 %                             Oxygen Therapy            Room air     Patient seen and examined. Treated with aspirin p.o.  No chest pain currently in the emergency room   Labs and imaging reviewed as above.   Troponin within normal limits, no acute pathology and chest x-ray is read by me  On re-evaluation, patient reported no continued chest pain  Patient remained stable during ED evaluation.   Case discussed with [facility] for admission      Impression and Plan   Diagnosis   Chest pain (SQC58-ST R07.9)   Plan   Disposition: Place in Observation Unit, Patient care transitioned to: Time: 12/2/2018 07:11:00, Lisbeth SYED, Jeffry Rodrigues.    Counseled: Patient, Regarding diagnosis, Regarding diagnostic results, Regarding treatment plan, Patient indicated understanding of instructions.    Notes: I, Michael Garces, acted solely as a scribe for and in the presence of Dr. Hutchison who performed the service., I acknowledge that the documentation was provided by a scribe on the date of the service noted above and that the documentation in the chart reflects work and decisions made by me alone. .

## 2022-04-29 NOTE — ED PROVIDER NOTES
"   Patient:   Cony Roman            MRN: [id number]            FIN: [id number]               Age:   81 years     Sex:  Male     :  1940   Associated Diagnoses:   Chest pain; CAD (coronary artery disease); Hypertensive urgency   Author:   Ro Cat MD      Basic Information   Time seen: Date & time 10/28/2021 09:28:00.   History source: Patient.   Arrival mode: Private vehicle.   History limitation: None.   Additional information: Patient's physician(s): Mynor Cruz MD, Chief Complaint from Nursing Triage Note : Chief Complaint   10/28/2021 9:27 CDT      Chief Complaint           Pt. c/o L upper abdominal pain and bilateral lower extremity pain and swelling, denies injury, that started today. Bilateral pedal pulse 2+. Pt. reports an episode of CP yesterday.  .      History of Present Illness   The patient presents with   81 year old male presents to ED for left sided chest pain, left upper abdominal pain, and bilateral leg swelling - MAIDA Mason  and     I, Dr. Cat, assumed care of this patient at 1137.  82 y/o male with a history of HTN and HLD presents to ED c/o LUQ pain that began yesterday. Pt also reports mild SOB and LE swelling. Pt denies having these symptoms in the past. He also denies being prescribed any diuretics. No chest pain per pt. .  The onset was 1  days ago.  The course/duration of symptoms is constant.  Location: Left upper abdomen. Radiating pain: none. The character of symptoms is "pain".  The degree at onset was moderate.  The degree at maximum was moderate.  The degree at present is moderate.  The exacerbating factor is none.  The relieving factor is none.  Risk factors consist of hypertension and hyperlipidemia.  Prior episodes: none.  Therapy today None.  Associated symptoms: shortness of breath and LE swelling.        Review of Systems   Constitutional symptoms:  No fever, no chills   Skin symptoms:  Abrasions, No lacerations or contusions   Eye " symptoms:  Vision unchangedNo pain, no blurred vision.    ENMT symptoms:  No epistaxis, No ear pain,    Respiratory symptoms:  Shortness of breath (mild)No hemoptysis   Cardiovascular symptoms:  swelling to LE bilaterallyNo chest pain, no syncope.    Gastrointestinal symptoms:  Abdominal painNo nausea, no vomiting.    Musculoskeletal symptoms:  No back pain, no Muscle pain.    Neurologic symptoms:  No headache, no dizziness, no numbness, no tingling, no weakness.    Hematologic/Lymphatic symptoms:  No anticoagulant useBleeding tendency negative, bruising tendency negative.              Additional review of systems information: All other systems reviewed and otherwise negative.      Health Status   Allergies: No known allergies.   Medications:  (Selected)   Prescriptions  Prescribed  isosorbide MONOnitrate 60 mg oral tablet, Extended Release: 60 mg = 1 tab(s), Oral, qAM, # 30 tab(s), 5 Refill(s)  lisinopril 20 mg oral tablet: 20 mg = 1 tab(s), Oral, Daily, # 30 tab(s), 5 Refill(s)  Documented Medications  Documented  Aspir 81 oral delayed release tablet: 81 mg = 1 tab(s), Oral, Daily, # 30 tab(s), 0 Refill(s)  METOPROLOL SUCC ER 25 MG TAB: 25 mg = 1 tab(s), Oral, Daily  NITROGLYCERIN 0.4 MG TABLET SL: 1 tab, SL, q5min, PRN PRN chest pain, x3 doses  hydrochlorothiazide 12.5 mg oral tablet: 12.5 mg = 1 tab(s), Oral, Daily  rosuvastatin 40 mg oral tablet: 40 mg = 1 tab(s), Oral, Daily.      Past Medical/ Family/ Social History   Medical history:    Resolved  Tobacco user ([id number]): Onset in 1952 at 12 years.  Resolved on 12/1/2017 at 77 years.  Hernia (553.9):  Resolved.  HTN (hypertension) (401.9):  Resolved.  Hyperlipidemia ([id number]):  Resolved.  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  Comments:  2/2/2016 CST 8:07 TRES - Jasmine SINGER , Massiel Gilbert  right.   Surgical history:    Abdominal Aortic Aneurysmectomy (.) on 9/27/2018 at 78 Years.  Comments:  9/27/2018 12:16 ELDAT - Rm SINGER, Jaxon  SOLIS  auto-populated from documented surgical case  Nephrectomy (Right) on 2/2/2016 at 75 Years.  Comments:  2/2/2016 13:46 TRES - Kwasi SINGER, Heather RENE  auto-populated from documented surgical case  Hernia repair ([id number]).  Lumpectomy ([id number]).  Colonoscopy ([id number])..   Family history:    No family history items have been selected or recorded..   Social history: Alcohol use: Occasionally, Tobacco use: Denies.      Physical Examination               Vital Signs   Vital Signs   10/28/2021 12:37 CDT     Peripheral Pulse Rate     46 bpm  LOW                             Respiratory Rate          18 br/min                             SpO2                      99 %                             Systolic Blood Pressure   137 mmHg                             Diastolic Blood Pressure  82 mmHg                             Mean Arterial Pressure, Cuff              100 mmHg    10/28/2021 11:56 CDT     Peripheral Pulse Rate     45 bpm  LOW    10/28/2021 11:00 CDT     Peripheral Pulse Rate     45 bpm  LOW                             Heart Rate Monitored      44 bpm  LOW                             Respiratory Rate          19 br/min                             SpO2                      98 %                             Oxygen Therapy            Room air    10/28/2021 10:00 CDT     Heart Rate Monitored      53 bpm  LOW                             Respiratory Rate          16 br/min                             SpO2                      99 %                             Oxygen Therapy            Room air                             Systolic Blood Pressure   143 mmHg  HI                             Diastolic Blood Pressure  99 mmHg  HI                             Mean Arterial Pressure, Cuff              114 mmHg    10/28/2021 9:27 CDT      Temperature Oral          36.8 DegC                             Temperature Oral (calculated)             98.24 DegF                             Peripheral Pulse Rate     68 bpm                              Respiratory Rate          20 br/min                             SpO2                      99 %                             Oxygen Therapy            Room air                             Systolic Blood Pressure   172 mmHg  HI                             Diastolic Blood Pressure  82 mmHg  .   Measurements   10/28/2021 9:27 CDT      Weight Dosing             72 kg                             Weight Measured and Calculated in Lbs     158.73 lb                             Weight Estimated          72 kg                             Height/Length Dosing      172 cm                             Height/Length Estimated   172 cm                             Body Mass Index Estimated 24.34 kg/m2  .   Basic Oxygen Information   10/28/2021 12:37 CDT     SpO2                      99 %    10/28/2021 11:00 CDT     SpO2                      98 %                             Oxygen Therapy            Room air    10/28/2021 10:00 CDT     SpO2                      99 %                             Oxygen Therapy            Room air    10/28/2021 9:27 CDT      SpO2                      99 %                             Oxygen Therapy            Room air  .   General:  Alert, no acute distress.    Neurological:  Alert and oriented to person, place, time, and situation   Skin:  Warm, dry   Head:  Normocephalic, atraumatic.    Neck:  Supple, trachea midline   Eye:  Normal conjunctiva   Ears, nose, mouth and throat:  Oral mucosa moist.   Cardiovascular:  No murmur, Normal peripheral perfusion, Bradycardia, Edema: trace 1+ edema to bilateral LE.    Respiratory:  Lungs are clear to auscultation, respirations are non-labored.    Gastrointestinal:  Soft, Non distended, Normal bowel sounds, extensive post-surgical abdominal changes. no palpable mass. LUQ tenderness, Guarding: Negative, Rebound: Negative.    Psychiatric:  Cooperative      Medical Decision Making   Rationale:  Mr. Roman presented w/ chest pain, dyspnea. ED work up notable  only for peripheral edema on examination, bradycardia and hypertension. EKG w/o overt acute ischemic changes and cardiac enzymes are negative. Consider hypertensive urency as etiology of his symptoms. Given his age and comorbid conditions, will admit under telemetry observation, plan for BP control, trend serial cardiac enzymes, cardiology consultation. Findings and plan discussed with the patient, and he is agreeable to admission at this time.    .   Documents reviewed:  Emergency department nurses' notes.   Orders  Launch Order Profile (Selected)   Inpatient Orders  Ordered (Collected)  POC iSTAT ER Troponin request: BLOOD, STAT collect, Collected, 10/28/21 9:40:24 CDT, Stop date 10/28/21 9:40:00 CDT, Lab Collect, Print Label By Order Location  Ordered (Exam Ordered)  CT Abdomen and Pelvis W Contrast: Stat, 10/28/21 11:39:00 CDT, Abdominal Pain, LUQ abd pain, None, Patient Bed, Patient Has IV?, Creatinine if needed per protocol, Rad Type, 10/28/21 11:39:00 CDT  Completed  Automated Diff: NOW collect, 10/28/21 9:40:00 CDT, Blood, Collected, Stop date 10/28/21 9:40:00 CDT, Lab Collect, Print Label By Order Location, 10/28/21 9:32:00 CDT  BNP: STAT collect, 10/28/21 9:40:24 CDT, BLOOD, Collected, Stop date 10/28/21 9:40:00 CDT, Lab Collect  CBC w/ Auto Diff: NOW collect, 10/28/21 9:40:24 CDT, BLOOD, Collected, Stop date 10/28/21 9:40:00 CDT, Lab Collect  CMP: STAT collect, 10/28/21 9:40:24 CDT, BLOOD, Collected, Stop date 10/28/21 9:40:00 CDT, Lab Collect  EKG Adult 12 Lead: 10/28/21 12:26:00 CDT, Stat, Once, 10/28/21 12:26:00 CDT, Patient Bed, Patient Has IV, Standard Precautions, -1, -1, 10/28/21 12:26:00 CDT  EKG: 10/28/21 9:32:00 CDT, Stat, Once, 10/28/21 9:32:00 CDT, Patient Bed, Patient Has IV, Standard Precautions, -1, -1, 10/28/21 9:32:00 CDT  Estimated Glomerular Filtration Rate: STAT collect, 10/28/21 9:40:00 CDT, Blood, Collected, Stop date 10/28/21 9:40:00 CDT, Lab Collect, Print Label By Order Location,  10/28/21 9:32:00 CDT  Lidocaine Viscous: 15 mL, form: Soln, Oral, Once, first dose 10/28/21 11:40:00 CDT, stop date 10/28/21 11:40:00 CDT, STAT  Mylanta Max with Simethicone  (400/400/40mg per 5mL) UD Susp: 30 mL, form: Susp, Oral, Once, first dose 10/28/21 11:40:00 CDT, stop date 10/28/21 11:40:00 CDT, STAT  POC Istat ER Troponin: Blood, Stat collect, Collected, 10/28/21 10:31:47 CDT  Troponin-I: STAT collect, 10/28/21 9:40:24 CDT, BLOOD, Collected, Stop date 10/28/21 9:40:00 CDT, Lab Collect  XR Chest 2 Views: Stat, 10/28/21 9:32:00 CDT, Chest Pain, None, Patient Bed, Patient Has IV?, Rad Type, Not Scheduled, 10/28/21 9:32:00 CDT.   Electrocardiogram:  Time 10/28/2021 09:29:00, rate 62, normal sinus rhythm, No ST-T changes, no ectopy, normal MA & QRS intervals, EP Interp.    Electrocardiogram:  Time 10/28/2021 11:12:00, rate 45, No ST-T changes, no ectopy, normal MA & QRS intervals, EP Interp, The Rhythm is sinus bradycardia.  .    Results review:  Lab results : Lab View   10/28/2021 10:31 CDT     POC Troponin              0.01 ng/mL    10/28/2021 10:19 CDT     Est Creat Clearance Ser   39.94 mL/min    10/28/2021 9:40 CDT      Sodium Lvl                144 mmol/L                             Potassium Lvl             4.6 mmol/L                             Chloride                  111 mmol/L  HI                             CO2                       23 mmol/L                             Calcium Lvl               9.3 mg/dL                             Glucose Lvl               82 mg/dL                             BUN                       13.1 mg/dL                             Creatinine                1.39 mg/dL  HI                             eGFR-AA                   >60  NA                             eGFR-GRAHAM                  52 mL/min/1.73 m2  NA                             Bili Total                0.9 mg/dL                             Bili Direct               0.3 mg/dL                             Bili  Indirect             0.60 mg/dL                             AST                       20 unit/L                             ALT                       11 unit/L                             Alk Phos                  115 unit/L                             Total Protein             6.9 gm/dL                             Albumin Lvl               4.0 gm/dL                             Globulin                  2.9 gm/dL                             A/G Ratio                 1.4 ratio                             BNP                       114.1 pg/mL  HI                             Troponin-I                0.018 ng/mL                             WBC                       4.0 x10(3)/mcL  LOW                             RBC                       4.91 x10(6)/mcL                             Hgb                       13.6 gm/dL  LOW                             Hct                       45.9 %                             Platelet                  183 x10(3)/mcL                             MCV                       93.5 fL                             MCH                       27.7 pg                             MCHC                      29.6 gm/dL  LOW                             RDW                       14.0 %                             MPV                       10.3 fL                             Abs Neut                  1.62 x10(3)/mcL  LOW                             Neutro Auto               40 %  NA                             Lymph Auto                46 %  NA                             Mono Auto                 10 %  NA                             Eos Auto                  4 %  NA                             Abs Eos                   0.2 x10(3)/mcL                             Basophil Auto             0 %  NA                             Abs Neutro                1.62 x10(3)/mcL  LOW                             Abs Lymph                 1.8 x10(3)/mcL                             Abs Mono                  0.4 x10(3)/mcL                              Abs Baso                  0.0 x10(3)/mcL  , Interpretation Labs unremarkable.    Radiology results:  Reviewed radiologist's report, Rad Results (ST)  < 12 hrs   Accession: [id number]  Order: XR Chest 2 Views  Report Dt/Tm: 10/28/2021 10:04  Report:   EXAMINATION  XR Chest 2 Views     INDICATION  Chest Pain     Comparison: 2 August, 2021     FINDINGS  Lines/tubes/devices:  ECG leads overlie the chest.     The cardiomediastinal silhouette and central pulmonary vasculature are  unremarkable.  The trachea is midline. There is no lobar consolidation, pleural  effusion, or pneumothorax.     There is no acute osseous or extrathoracic abnormality.     IMPRESSION  No acute process or other adverse interval change.    .       Impression and Plan   Diagnosis   Chest pain (QVS65-GV R07.9)   CAD (coronary artery disease) (OXV92-SZ I25.10)   Hypertensive urgency (EIJ21-NI I16.0)      Calls-Consults   -  Jessi Mejias.   Plan   Condition: Stable.    Disposition: Admit time  10/28/2021 15:28:00, Place in Observation Telemetry Unit, Ana Laura SYED, Emmanuel GIANG, case discussed with Michaela Smith NP.    Counseled: Patient, Regarding diagnosis, Regarding diagnostic results, Regarding treatment plan, Patient indicated understanding of instructions.    Notes: IBambi, acted solely as a scribe for and in the presence of Dr. Cat who performed the service., I, Ro Cat, have independently performed the history, physical, medical decision making and procedures as documented above and agree with the scribe's documentation. .

## 2022-04-29 NOTE — ED PROVIDER NOTES
"   Patient:   Cony Roman            MRN: [id number]            FIN: [id number]               Age:   80 years     Sex:  Male     :  1940   Associated Diagnoses:   None   Author:   Owen Yi MD      Basic Information   Time seen: Date & time 2021 14:23:00.   History source: Patient.   Arrival mode: Private vehicle, walking.   History limitation: None.   Additional information: Patient's physician(s): Nancy SYED, Mynor ALONSO.      History of Present Illness   The patient presents with chest pain, 81 y/o M presents to ED with left-sided chest pain onset yesterday with worsening today. Also notes SOB. MAIDA Alvarado and I, Dr. Yi, assumed care of this patient at 1718.    81 y/o AAM with a history of HTN and HLD presents to the ED complaining of intermittent left-sided chest pain onset a few days ago. The pt identifies mild SOB as an associated symptom. Denies lightheadedness, exertion as an exacerbating factor, or feeling any chest pain currently. .  The onset was "A few days".  The course/duration of symptoms is fluctuating in intensity.  Location: Left chest. Radiating pain: none. The character of symptoms is "Pain".  The degree at onset was moderate.  The degree at maximum was moderate.  The degree at present is moderate.  Exertion is not a exacerbating factor.  The relieving factor is none.  Risk factors consist of hypertension.  Prior episodes: none.  Therapy today None.  Associated symptoms: shortness of breath.        Review of Systems   Constitutional symptoms:  denies lightheadedness.   Skin symptoms:  Negative except as documented in HPI.   Eye symptoms:  Negative except as documented in HPI.   ENMT symptoms:  Negative except as documented in HPI.   Respiratory symptoms:  Shortness of breath.   Cardiovascular symptoms:  Chest pain, left chest.    Gastrointestinal symptoms:  Negative except as documented in HPI.   Genitourinary symptoms:  Negative except as documented in HPI. "   Musculoskeletal symptoms:  Negative except as documented in HPI.   Neurologic symptoms:  Negative except as documented in HPI.   Psychiatric symptoms:  Negative except as documented in HPI.   Endocrine symptoms:  Negative except as documented in HPI.   Hematologic/Lymphatic symptoms:  Negative except as documented in HPI.             Additional review of systems information: All other systems reviewed and otherwise negative.      Health Status   Allergies: No known allergies.   Medications:  (Selected)   Prescriptions  Prescribed  isosorbide MONOnitrate 60 mg oral tablet, Extended Release: 60 mg = 1 tab(s), Oral, qAM, # 30 tab(s), 5 Refill(s)  lisinopril 20 mg oral tablet: 20 mg = 1 tab(s), Oral, Daily, # 30 tab(s), 5 Refill(s)  Documented Medications  Documented  Aspir 81 oral delayed release tablet: 81 mg = 1 tab(s), Oral, Daily, # 30 tab(s), 0 Refill(s)  METOPROLOL SUCC ER 25 MG TAB: 25 mg = 1 tab(s), Oral, Daily  NITROGLYCERIN 0.4 MG TABLET SL: 1 tab, SL, q5min, PRN PRN chest pain, x3 doses  alPRAzolam 0.5 mg oral tablet: 1 mg = 2 tab(s), Oral, Once a day (at bedtime), PRN PRN anxiety, # 30 tab(s), 0 Refill(s)  hydroCHLOROthiazide 12.5 mg oral capsule: 12.5 mg = 1 cap(s), Oral, Daily, 0 Refill(s)  rosuvastatin 40 mg oral capsule: 40 mg = 1 cap(s), Oral, At Bedtime, # 30 cap(s), 0 Refill(s).      Past Medical/ Family/ Social History   Medical history:    Resolved  Tobacco user (284223300): Onset in 1952 at 12 years.  Resolved on 12/1/2017 at 77 years.  HTN (hypertension) (401.9):  Resolved.  Hyperlipidemia ([id number]):  Resolved.  Hernia (553.9):  Resolved.  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  Comments:  2/2/2016 CST 8:07 TRES - Jasmine SINGER , Massiel Gilbert  right.   Surgical history:    Abdominal Aortic Aneurysmectomy (.) on 9/27/2018 at 78 Years.  Comments:  9/27/2018 12:16 CDT - Rm SINGER, Jaxon ELY  auto-populated from documented surgical case  Nephrectomy (Right) on 2/2/2016 at 75  Years.  Comments:  2/2/2016 13:46 TRES - Kwasi SINGER, Heather RENE  auto-populated from documented surgical case  Hernia repair ([id number]).  Lumpectomy ([id number]).  Colonoscopy ([id number])..   Family history: Not significant.   Social history: Alcohol use: Denies, Tobacco use: Denies.      Physical Examination               Vital Signs   Vital Signs   4/30/2021 14:22 CDT      Temperature Oral          36.9 DegC                             Temperature Oral (calculated)             98.42 DegF                             Peripheral Pulse Rate     59 bpm  LOW                             Respiratory Rate          20 br/min                             SpO2                      97 %                             Oxygen Therapy            Room air                             Systolic Blood Pressure   127 mmHg                             Diastolic Blood Pressure  75 mmHg  .   Measurements   4/30/2021 14:22 CDT      Weight Dosing             75 kg                             Weight Measured and Calculated in Lbs     165.34 lb                             Weight Estimated          75 kg                             Height/Length Dosing      172.72 cm                             Height/Length Estimated   172.72 cm                             Body Mass Index Estimated 25.14 kg/m2  .   Basic Oxygen Information   4/30/2021 14:22 CDT      SpO2                      97 %                             Oxygen Therapy            Room air  General:  Alert, no acute distress   Neurological:  Alert and oriented to person, place, time, and situation, No focal neurological deficit observed, CN II-XII intact, normal sensory observed, normal motor observed, normal speech observed   Skin:  Warm, pink, intact, moist, no rash   Head:  Normocephalic, atraumatic   Cardiovascular:  Regular rate and rhythm, No murmur, Normal peripheral perfusion, No edema   Respiratory:  Lungs are clear to auscultation, respirations are non-labored, breath sounds are equal,  Symmetrical chest wall expansion.    Musculoskeletal:  Normal ROM, normal strength   Gastrointestinal:  Soft, Nontender, Non distended, Normal bowel sounds   Lymphatics:  No lymphadenopathy.   Psychiatric:  Cooperative, appropriate mood & affect, normal judgment      Medical Decision Making   Rationale:  80-year-old presents for complaint of chest pain.  Occurring at rest.  Multiple episodes.  Associated shortness of breath.  Obtain labs consider with metabolic relation of cardiac disease.  Plan admission for unstable angina versus NSTEMI.  No anticoagulation at this time given no current chest pain.  Will give full dose aspirin for antiplatelet effect..   Documents reviewed:  Emergency department nurses' notes.   Orders  Launch Orders   Laboratory:  POC BNP iSTAT request (Order): Blood, Stat collect, 4/30/2021 14:25 CDT by Chalino Almonte PA-C Lab Collect, Print Label By Order Location  POC iSTAT ER Troponin request (Order): Blood, Stat collect, 4/30/2021 14:24 CDT by Chalino Almonte PA-C Lab Collect, Print Label By Order Location  Troponin-I (Order): Stat collect, 4/30/2021 14:24 CDT, Blood, Lab Collect, Print Label By Order Location, 4/30/2021 14:24 CDT  CMP (Order): Stat collect, 4/30/2021 14:24 CDT, Blood, Lab Collect, Print Label By Order Location, 4/30/2021 14:24 CDT  CBC w/ Auto Diff (Order): Stat collect, 4/30/2021 14:24 CDT, Blood, Lab Collect, Print Label By Order Location, 4/30/2021 14:24 CDT  Radiology:  CXR 1 View (Order): Stat, 4/30/2021 14:25 CDT, Shortness of Breath, None, Patient Bed, Patient Has IV?, Rad Type, Not Scheduled  Cardiology:  EKG (Order): 4/30/2021 14:24 CDT, Stat, Once, 4/30/2021 14:24 CDT, Patient Bed, Patient Has IV, Standard Precautions, -1, -1, 4/30/2021 14:24 CDT, launch Order Profile (Selected)   Inpatient Orders  Ordered  aspirin 81 mg oral tablet, CHEWABLE: 324 mg, form: Tab-Chew, Oral, Once, first dose 04/30/21 17:23:00 CDT, stop date 04/30/21 17:23:00 CDT, STAT, 4 chew tab  = 5 grain ASA  Ordered (Collected)  POC BNP iSTAT request: BLOOD, STAT collect, Collected, 21 14:34:27 CDT, Stop date 21 14:34:00 CDT, Lab Collect, Print Label By Order Location  POC iSTAT ER Troponin request: BLOOD, STAT collect, Collected, 21 14:34:27 CDT, Stop date 21 14:34:00 CDT, Lab Collect, Print Label By Order Location  Completed  Automated Diff: STAT collect, 21 14:34:00 CDT, Blood, Collected, Stop date 21 14:34:00 CDT, Lab Collect, Print Label By Order Location, 21 14:24:00 CDT  CBC w/ Auto Diff: STAT collect, 21 14:34:27 CDT, BLOOD, Collected, Stop date 21 14:34:00 CDT, Lab Collect  CMP: STAT collect, 21 14:34:27 CDT, BLOOD, Collected, Stop date 21 14:34:00 CDT, Lab Collect  CXR 1 View: Stat, 21 14:25:00 CDT, Shortness of Breath, None, Patient Bed, Patient Has IV?, Rad Type, Not Scheduled, 21 14:25:00 CDT  EK21 14:24:00 CDT, Stat, Once, 21 14:24:00 CDT, Patient Bed, Patient Has IV, Standard Precautions, -1, -1, 21 14:24:00 CDT  Estimated Glomerular Filtration Rate: STAT collect, 21 14:34:00 CDT, Blood, Collected, Stop date 21 14:34:00 CDT, Lab Collect, Print Label By Order Location, 21 14:24:00 CDT  POC BNP iSTAT: Blood, Stat collect, Collected, 21 15:29:05 CDT  POC Istat ER Troponin: Blood, Stat collect, Collected, 21 15:27:54 CDT  Troponin-I: STAT collect, 21 14:34:27 CDT, BLOOD, Collected, Stop date 21 14:34:00 CDT, Lab Collect.    Electrocardiogram:  EKG timed 1423, 2021 peer demonstrates sinus bradycardia, ventricular at 56 bpm.  Has normal levels including  ms QRS 92 ms,  ms.  Has no specific ST-T wave changes suggest acute ischemia.  EKG interpreted by ED physician, Owen Yi.   Results review:  Lab results : Lab View   2021 15:29 CDT      POC BNP iSTAT             24 pg/mL    2021 15:27 CDT      POC Troponin              0.06  ng/mL    4/30/2021 15:16 CDT      Est Creat Clearance Ser   39.04 mL/min    4/30/2021 14:34 CDT      Sodium Lvl                144 mmol/L                             Potassium Lvl             4.3 mmol/L                             Chloride                  111 mmol/L  HI                             CO2                       23 mmol/L                             Calcium Lvl               9.2 mg/dL                             Glucose Lvl               77 mg/dL  LOW                             BUN                       20.8 mg/dL                             Creatinine                1.46 mg/dL  HI                             eGFR-AA                   60  NA                             eGFR-GRAHAM                  49 mL/min/1.73 m2  NA                             Bili Total                0.5 mg/dL                             Bili Direct               0.3 mg/dL                             Bili Indirect             0.20 mg/dL                             AST                       23 unit/L                             ALT                       12 unit/L                             Alk Phos                  124 unit/L                             Total Protein             7.1 gm/dL                             Albumin Lvl               4.0 gm/dL                             Globulin                  3.1 gm/dL                             A/G Ratio                 1.3 ratio                             Troponin-I                0.016 ng/mL                             WBC                       4.3 x10(3)/mcL  LOW                             RBC                       4.89 x10(6)/mcL                             Hgb                       13.6 gm/dL  LOW                             Hct                       45.3 %                             Platelet                  198 x10(3)/mcL                             MCV                       92.6 fL                             MCH                       27.8 pg                             MCHC                       30.0 gm/dL  LOW                             RDW                       13.2 %                             MPV                       10.6 fL                             Abs Neut                  2.08 x10(3)/mcL  LOW                             Neutro Auto               48 %  NA                             Lymph Auto                41 %  NA                             Mono Auto                 8 %  NA                             Eos Auto                  2 %  NA                             Abs Eos                   0.1 x10(3)/mcL                             Basophil Auto             0 %  NA                             Abs Neutro                2.08 x10(3)/mcL  LOW                             Abs Lymph                 1.8 x10(3)/mcL                             Abs Mono                  0.4 x10(3)/mcL                             Abs Baso                  0.0 x10(3)/mcL  .   Radiology results:  Rad Results (ST)  < 12 hrs   Accession: [id number]  Order: XR Chest 1 View  Report Dt/Tm: 04/30/2021 15:06  Report:   EXAMINATION  XR Chest 1 View     INDICATION  Shortness of Breath     Comparison: 15 November, 2020     FINDINGS  Lines/tubes/devices:  None present.     The cardiomediastinal silhouette and central pulmonary vasculature are  unremarkable for AP projection.  The trachea is midline. There is no lobar consolidation or significant  pleural effusion. No definite pneumothorax is appreciated.     There is no acute osseous or extrathoracic abnormality. Regional  osseous degenerative changes are similar.     IMPRESSION  No acute process or other adverse interval change.    .      Reexamination/ Reevaluation   Notes: Troponin detectable but not elevated.  Case discussed with [facility] - care of patient accepted.      Impression and Plan   Diagnosis   Primary impression: Unstable angina   Plan   Condition: Improved.    Disposition: Admit time  4/30/2021 18:18:00, Place in Observation Telemetry Unit, [facility]  Mynor SYED.    Counseled: Patient, Regarding diagnosis, Regarding diagnostic results, Regarding treatment plan, Patient indicated understanding of instructions.    Notes: I, Liza Esteban, acted solely as a scribe for and in the presence of Dr. Yi who performed the service., I , Owen Yi, acknowledge that the documentation on this chart was provided by the scribe on the date of service noted above and that the documentation in the chart accurately reflects work and decisions made by me alone.

## 2022-05-03 NOTE — HISTORICAL [facility]
This is a historical note converted from Gladys. Formatting and pictures may have been removed.  Please reference Gladys for original formatting and attached multimedia. Admit and Discharge Dates  Admit Date: 10/28/2021  Discharge Date: 10/30/2021  Physicians  Attending Physician - Ana Laura SYED, Emmanuel GIANG  Admitting Physician - Ana Laura SYED, Emmanuel GIANG  Consulting Physician - Ana Laura SYED, Emmanuel GIANG  Primary Care Physician - Simi SYED, Chucky DALTON  Discharge Diagnosis  1.?FTT (failure to thrive) in adult?R62.7  2.?Sinus bradycardia?R00.1  3.?Chest pain?R07.9  4.?CAD (coronary artery disease)?I25.10  5.?Hypertensive urgency?I16.0  6.?Stage 3 chronic kidney disease?N18.30  7.?AAA (abdominal aortic aneurysm)?I71.4  8.?History of nephrectomy, right?Z90.5  9.?BPH (benign prostatic hyperplasia)?N40.0  10.?Hepatic hemangioma?D18.03  11.?Tobacco abuse?Z72.0  Surgical Procedures  No procedures recorded for this visit.  Immunizations  10/29/2021 - influenza virus vaccine, inactivated?  Admission Information  cc: L upper abdominal pain and bilateral lower extremity pain and swelling, denies injury, that started today. Bilateral pedal pulse 2+.  ?   Medical?problems: hypertension, hyperlipidemia, coronary artery disease,?abdominal aortic aneurysm status post repair,?renal cell carcinoma status post partial right nephrectomy.  ?   Presents to the ED with complaints of abdominal pain. Patient reports abdominal pain is intermittent, located to the umbilical region, achy in nature, radiating to his back?since 10/26/2021. Other symptoms include chest pain which began yesterday 10/27/2021. Chest pain located to the center of the chest, lasted 1 minute in duration, occurred?twice and nonradiating. He denies fever, shortness of breath, dizziness, syncope, nausea, vomiting and diarrhea. Patients cardiologist is Dr. Cruz.?  ?   Vitals upon presentation consistent with temperature 98.2 ?F, heart rate 68, blood pressure 172/82, respiratory  rate 20 and SPO2 99% on room air.? Patient is heart rate decreased to 45 and EKG revealed revealed sinus bradycardia with a ventricular rate of 45, ST elevation considering early repolarization, pericarditis or injury.? Labs are significant for WBC 4000, H&H 13.6/46 (12.8/42 on 8/2021), chloride 111, BUN/creatinine 13/1.39 (27.5/1.72 on 8/2021), . Initial troponin 0.018 with repeat increasing to 0.023.? COVID-19 rapid test negative.? X-ray chest negative for acute process.? CT abdomen pelvis negative for acute abdominal pelvic process or new abnormalities but an unchanged large right hepatic hemangioma, partial right nephrectomy, markedly enlarged and heterogeneously enhancing prostate resulting in mass-effect in the bladder floor with possible outflow obstruction and infrarenal abdominal aortic aneurysm repair. Cardiology was consulted. Patient is admitted to the?hospital medicine service for observation.  ?  Hospital Course  Patient was bradycardic on admission, asymptomatic.?metoprolol was initially held but restarted at a lower dose, heart rate improved significantly and patient was without symptoms.? Echocardiogram was performed revealed EF of 40% no valvular abnormalities appreciated.? Normal structural and functional visualization of the right ventricle.  ?   Hospital course and discharge care plan has been discussed with patient he voices understanding and agreement.? Patient is to take Lasix 40 mg as needed for swelling.? Continue metoprolol but 12.5 twice daily and other medications as noted in medication reconciliation.  Patient voiced understanding and agreement with plan and is eager to discharge.? He is advised to return to ED or call 911 in case of an emergency and or if symptoms worsen  Significant Findings  Labs Last 72 Hours?  ?Chemistry? Hematology/Coagulation?   Sodium Lvl: 139 mmol/L (10/29/21 06:57:00) WBC:?3.3 x10(3)/mcL?Low (10/29/21 05:20:00)   Sodium Lvl: 144 mmol/L (10/28/21  09:40:00) WBC:?4 x10(3)/mcL?Low (10/28/21 09:40:00)   Potassium Lvl: 4 mmol/L (10/29/21 06:57:00) RBC:?4.28 x10(6)/mcL?Low (10/29/21 05:20:00)   Potassium Lvl: 4.6 mmol/L (10/28/21 09:40:00) RBC: 4.91 x10(6)/mcL (10/28/21 09:40:00)   Chloride:?113 mmol/L?High (10/29/21 06:57:00) Hgb:?12.2 gm/dL?Low (10/29/21 05:20:00)   Chloride:?111 mmol/L?High (10/28/21 09:40:00) Hgb:?13.6 gm/dL?Low (10/28/21 09:40:00)   CO2:?20 mmol/L?Low (10/29/21 06:57:00) Hct:?39.5 %?Low (10/29/21 05:20:00)   CO2: 23 mmol/L (10/28/21 09:40:00) Hct: 45.9 % (10/28/21 09:40:00)   Calcium Lvl:?8.6 mg/dL?Low (10/29/21 06:57:00) Platelet: 132 x10(3)/mcL (10/29/21 05:20:00)   Calcium Lvl: 9.3 mg/dL (10/28/21 09:40:00) Platelet: 183 x10(3)/mcL (10/28/21 09:40:00)   Glucose Lvl: 90 mg/dL (10/29/21 06:57:00) MCV: 92.3 fL (10/29/21 05:20:00)   Glucose Lvl: 82 mg/dL (10/28/21 09:40:00) MCV: 93.5 fL (10/28/21 09:40:00)   BUN: 13.4 mg/dL (10/29/21 06:57:00) MCH: 28.5 pg (10/29/21 05:20:00)   BUN: 13.1 mg/dL (10/28/21 09:40:00) MCH: 27.7 pg (10/28/21 09:40:00)   Creatinine:?1.42 mg/dL?High (10/29/21 06:57:00) MCHC:?30.9 gm/dL?Low (10/29/21 05:20:00)   Creatinine:?1.39 mg/dL?High (10/28/21 09:40:00) MCHC:?29.6 gm/dL?Low (10/28/21 09:40:00)   Est Creat Clearance Ser: 39.1 mL/min (10/29/21 10:30:41) RDW: 14 % (10/29/21 05:20:00)   Est Creat Clearance Ser: 39.94 mL/min (10/28/21 10:19:50) RDW: 14 % (10/28/21 09:40:00)   eGFR-AA: >60 (10/29/21 06:57:00) MPV: 11.3 fL (10/29/21 05:20:00)   eGFR-AA: >60 (10/28/21 09:40:00) MPV: 10.3 fL (10/28/21 09:40:00)   eGFR-GRAHAM: 51 mL/min/1.73 m2 (10/29/21 06:57:00) Abs Neut:?1.68 x10(3)/mcL?Low (10/29/21 05:20:00)   eGFR-GRAHAM: 52 mL/min/1.73 m2 (10/28/21 09:40:00) Abs Neut:?1.62 x10(3)/mcL?Low (10/28/21 09:40:00)   Bili Total: 0.7 mg/dL (10/29/21 06:57:00) Neutro Auto: 51 % (10/29/21 05:20:00)   Bili Total: 0.9 mg/dL (10/28/21 09:40:00) Neutro Auto: 40 % (10/28/21 09:40:00)   Bili Direct: 0.3 mg/dL (10/29/21 06:57:00) Lymph  Auto: 35 % (10/29/21 05:20:00)   Bili Direct: 0.3 mg/dL (10/28/21 09:40:00) Lymph Auto: 46 % (10/28/21 09:40:00)   Bili Indirect: 0.4 mg/dL (10/29/21 06:57:00) Mono Auto: 11 % (10/29/21 05:20:00)   Bili Indirect: 0.6 mg/dL (10/28/21 09:40:00) Mono Auto: 10 % (10/28/21 09:40:00)   AST: 16 unit/L (10/29/21 06:57:00) Eos Auto: 2 % (10/29/21 05:20:00)   AST: 20 unit/L (10/28/21 09:40:00) Eos Auto: 4 % (10/28/21 09:40:00)   ALT: 7 unit/L (10/29/21 06:57:00) Abs Eos: 0.1 x10(3)/mcL (10/29/21 05:20:00)   ALT: 11 unit/L (10/28/21 09:40:00) Abs Eos: 0.2 x10(3)/mcL (10/28/21 09:40:00)   Alk Phos: 97 unit/L (10/29/21 06:57:00) Basophil Auto: 0 % (10/29/21 05:20:00)   Alk Phos: 115 unit/L (10/28/21 09:40:00) Basophil Auto: 0 % (10/28/21 09:40:00)   Total Protein: 5.9 gm/dL (10/29/21 06:57:00) Abs Neutro:?1.68 x10(3)/mcL?Low (10/29/21 05:20:00)   Total Protein: 6.9 gm/dL (10/28/21 09:40:00) Abs Neutro:?1.62 x10(3)/mcL?Low (10/28/21 09:40:00)   Albumin Lvl:?3.3 gm/dL?Low (10/29/21 06:57:00) Abs Lymph: 1.1 x10(3)/mcL (10/29/21 05:20:00)   Albumin Lvl: 4 gm/dL (10/28/21 09:40:00) Abs Lymph: 1.8 x10(3)/mcL (10/28/21 09:40:00)   Globulin: 2.6 gm/dL (10/29/21 06:57:00) Abs Mono: 0.4 x10(3)/mcL (10/29/21 05:20:00)   Globulin: 2.9 gm/dL (10/28/21 09:40:00) Abs Mono: 0.4 x10(3)/mcL (10/28/21 09:40:00)   A/G Ratio: 1.3 ratio (10/29/21 06:57:00) Abs Baso: 0 x10(3)/mcL (10/29/21 05:20:00)   A/G Ratio: 1.4 ratio (10/28/21 09:40:00) Abs Baso: 0 x10(3)/mcL (10/28/21 09:40:00)   BNP:?114.1 pg/mL?High (10/28/21 09:40:00)    Restick: Done (10/29/21 06:57:00)    Troponin-I: 0.028 ng/mL (10/29/21 05:20:00)    Troponin-I: 0.029 ng/mL (10/28/21 23:00:00)    Troponin-I: 0.015 ng/mL (10/28/21 17:45:00)    POC Troponin: 0.01 ng/mL (10/28/21 10:31:00)     ?  ______________________________________________________________________________________________________________________________________________________________________  Accession:?[id number]  Order:?CT Abdomen and Pelvis W Contrast  Report Dt/Tm:?10/28/2021 14:40  Report:?  EXAMINATION  CT Abdomen and Pelvis W Contrast  ?  INDICATION  Left upper quadrant pain  ?  Comparison: 18 November, 2015  ?  TECHNIQUE  Helical-acquisition CT images were obtained following the intravenous  administration of iodinated contrast media. Enteric contrast was not  utilized.  Multiplanar reformats were accomplished by a CT technologist at a  separate workstation and pushed to PACS for physician review.  ?  Automated tube current modulation, weight-based exposure dosing,  and/or iterative reconstruction technique utilized to reach lowest  reasonably achievable exposure rate.  DLP: 452 mGy*cm  ?  FINDINGS  Images were reviewed in soft tissue, lung, and bone windows.  ?  Exam quality: adequate  ?  Lines/tubes: None visualized.  ?  There is no acute or suspicious focal abnormality of the included lung  bases and cardiac chambers. No significant pericardial or pleural  fluid.  ?  The gallbladder and bile ducts are unremarkable. Diffusely hypodense  appearance of the liver compatible with steatosis is again noted.  Large right hepatic hemangiomas are unchanged in comparison (series 2,  image 10). Circumscribed simple-appearing left hepatic lobe cyst is  also again noted. No suspicious mass-like liver enhancement is  identified. The portal vein is widely patent.  The pancreas, spleen, and adrenal glands are unremarkable.  ?  Renal contrast enhancement is temporally symmetric. There are  postoperative changes of partial nephrectomy at the right upper renal  pole. Scattered bilateral cortical scarring is also evident. No new or  otherwise suspicious focal kidney lesion is identified. There are no  findings of high-grade distal obstructive  uropathy.  The urinary bladder is mildly distended with diffusely thickened  appearance of the bladder wall. No mass-like focal mural contour  irregularity is appreciated.  The prostate is again enlarged and markedly heterogeneous with  irregular median lobe enhancement. Mass effect upon the bladder floor  is present, as before.  ?  The included lower esophagus is unremarkable. The stomach is  nondilated.  There is no abnormal small bowel dilatation or discrete transition  point to suggest high-grade mechanical obstruction.  The appendix is unremarkable. Moderate volume thecal material is  retained throughout the course of the colon, suggesting element of  constipation. No focal large bowel lesion or acute process is  identified.  There is no free intra-abdominal air or fluid. No drainable  collections.  ?  Diffusely ectatic lower thoracic and abdominal aorta course is again  appreciated. The previously visualized fusiform infrarenal aortic  aneurysm with grossly irregular mural thrombus is less pronounced,  with markedly improved luminal contour. Additionally, there is  smoothing uniformly thickened external margin of the aneurysmal sac  with scattered calcification. Overall appearance could represent  interval postoperative change. The aneurysm now measures approximately  10.2 cm in length with AP x Tx dimensions of 3.4 cm x 3.8 cm; maximal  prior dimensions of 11.7 cm x 3.7 cm x 4.1 cm. No extraluminal  contrast extravasation or other findings suggestive of aneurysmal leak  are appreciated. There are no periaortic inflammatory fat changes.  The remaining visualized abdominopelvic vascular structures are  without evidence of acute or focal abnormality. Widespread mural  plaquing and calcification noted through the bilateral iliac and  femoral vessels, without visualized high-grade stenosis.  ?  No pathologic lymph node enlargement is identified.  ?  The body wall subcutaneous tissues and regional muscular  structures  are without acute or suspicious focal abnormality. There are newly  appreciated midline ventral abdominal wall changes consistent with  interval laparotomy. Right inguinal hernia repair is present, as  before.  There is similar diffusely heterogeneous appearance of the visualized  spinal column with advanced multilevel degenerative changes and  chronic grade 1 L2-3 and grade 2 L3-4 anterolisthesis. No convincing  acute osseous displacement or destructive focal lesion is identified.  ?  IMPRESSION  ?  ??1. No convincing CT evidence of acute abdominopelvic process or new  focal abnormality.  ??2. Interval changes of infrarenal abdominal aortic aneurysm favored  to represent surgical repair, needing correlation with pertinent  clinical details. No definite evidence of acute complication or  periaortic inflammatory changes identified.  ??3. Similar appearance of large right hepatic hemangiomas.  ??4. Interval changes of partial right nephrectomy with no findings of  local tumor recurrence or metastatic disease.  ??5. Markedly enlarged and heterogeneously enhancing prostate  resulting mass effect on the bladder floor, with possibility of  outflow obstruction not excluded.  ??6. Additional chronic secondary details discussed above.  _______________________________________________________________________________________________________________________________________________________________________?  Accession:?[id number]  Order:?XR Chest 2 Views  Report Dt/Tm:?10/28/2021 10:04  Report:?  EXAMINATION  XR Chest 2 Views  ?  INDICATION  Chest Pain  ?  Comparison: 2 August, 2021  ?  FINDINGS  Lines/tubes/devices:  ECG leads overlie the chest.  ?  The cardiomediastinal silhouette and central pulmonary vasculature are  unremarkable.  The trachea is midline. There is no lobar consolidation, pleural  effusion, or pneumothorax.  ?  There is no acute osseous or extrathoracic abnormality.  ?  IMPRESSION  No acute  process or other adverse interval change.  ?  Time Spent on discharge  ?  I have spent 35mins coordinating discharge of the patient. I have discussed all aspect of patients hospital course with the patient. All of patients questions have been answered. Patient voices understand and agreement with discharge plan as noted in this documentation.?  This note?was created with the assistance of Dragon voice recognition?software. There may be transcription errors as a result of using?this technology, however minimal.?Effort has been made to assure accuracy of transcription?but any obvious errors or omissions should be clarified with the author of this document.  ?  Objective  Vitals & Measurements  T:?37.2? ?C (Oral)? TMIN:?36.4? ?C (Oral)? TMAX:?37.2? ?C (Oral)? HR:?69(Peripheral)? HR:?69(Monitored)? RR:?15? BP:?132/72? SpO2:?98%?  Physical Exam  General: Appears comfortable, no acute distress.  Cognition and speech:?Oriented, speech clear and coherent.  Neuro:?AAOx3  Psych:?Cooperative. Appropriate mood and affect.  ?   HEENT:?Normocephalic,?no facial asymmetry.?  ?   Respiratory:??No accessory muscle use. ?Breath sounds are equal.  Cardiovascular:?Regular?rate. ?No peripheral edema.  ?   Integumentary:?Warm, dry, intact.  Musculoskeletal:?Purposeful movement noted. ?No contracture.  Patient Discharge Condition  stable and improved   Discharge Medication Reconciliation  Prescribed  atorvastatin (atorvastatin 80 mg oral tablet)?80 mg, Oral, Daily  furosemide (Lasix 20 mg oral tablet)?20 mg, Oral, Daily  metoprolol (metoprolol succinate 25 mg oral capsule, extended release)?12.5 mg, Oral, Daily  traZODone (traZODone 100 mg oral tablet)?100 mg, Oral, Once a day (at bedtime)  Continue  alPRAzolam (alPRAzolam 1 mg oral tablet)?1 mg, Oral, At Bedtime  aspirin (Aspir 81 oral delayed release tablet)?81 mg, Oral, Daily  isosorbide mononitrate (isosorbide MONOnitrate 60 mg oral tablet, Extended Release)?60 mg, Oral,  qAM  lisinopril (lisinopril 20 mg oral tablet)?20 mg, Oral, Daily  Discontinue  hydrochlorothiazide (hydrochlorothiazide 12.5 mg oral tablet)?12.5 mg, Oral, Daily  metoprolol (METOPROLOL SUCC ER 25 MG TAB)?25 mg, Oral, Daily  nitroglycerin (NITROGLYCERIN 0.4 MG TABLET SL)?1 tab, SL, q5min, PRN chest pain  rosuvastatin (rosuvastatin 40 mg oral tablet)?40 mg, Oral, Once a day (at bedtime)  Education and Orders Provided  Nonspecific Chest Pain, Easy-to-Read  Discharge - 10/30/21 8:15:00 CDT, Home?  Follow up  Mynor Cruz, on 11/05/2021  ????Blood work appt.Office will call you with follow up appt.  Chucky Belcher MD  ????someone from 3rd floor will call you with appt.

## 2022-05-03 NOTE — HISTORICAL [facility] CERNER
This is a historical note converted from [employer]. Formatting and pictures may have been removed.  Please reference [employer] for original formatting and attached multimedia. Chief Complaint  Pt. c/o L upper abdominal pain and bilateral lower extremity pain and swelling, denies injury, that started today. Bilateral pedal pulse 2+. Pt. reports an episode of CP yesterday.  History of Present Illness  Patient is an 81-year-old -American male with past?medical history of hypertension, hyperlipidemia, coronary artery disease,?abdominal aortic aneurysm status post repair,?renal cell carcinoma status post partial right nephrectomy who presents to the ED with complaints of abdominal pain. Patient reports abdominal pain is intermittent, located to the umbilical region, achy in nature, radiating to his back?since 10/26/2021. Other symptoms include chest pain which began yesterday 10/27/2021. Chest pain located to the center of the chest, lasted 1 minute in duration, occurred?twice and nonradiating. He denies fever, shortness of breath, dizziness, syncope, nausea, vomiting and diarrhea. Patients cardiologist is Dr. Curz.?  ?  Vitals upon presentation consistent with temperature 98.2 ?F, heart rate 68, blood pressure 172/82, respiratory rate 20 and SPO2 99% on room air.? Patient is heart rate decreased to 45 and EKG revealed revealed sinus bradycardia with a ventricular rate of 45, ST elevation considering early repolarization, pericarditis or injury.? Labs are significant for WBC 4000, H&H 13.6/46 (12.8/42 on 8/2021), chloride 111, BUN/creatinine 13/1.39 (27.5/1.72 on 8/2021), . Initial troponin 0.018 with repeat increasing to 0.023.? COVID-19 rapid test negative.? X-ray chest negative for acute process.? CT abdomen pelvis negative for acute abdominal pelvic process or new abnormalities but an unchanged large right hepatic hemangioma, partial right nephrectomy, markedly enlarged and heterogeneously enhancing  prostate resulting in mass-effect in the bladder floor with possible outflow obstruction and infrarenal abdominal aortic aneurysm repair. Cardiology was consulted. [facility].  Review of Systems  Except as documented all systems were reviewed and are negative.  Physical Exam  Vitals & Measurements  T:?36.8? ?C (Oral)? HR:?48(Peripheral)? RR:?22? BP:?173/87? SpO2:?98%? WT:?72?kg?  General:?NAD, nontoxic, no family?at bedside  HEENT:?moist mucus membranes, normocephalic, atraumatic, corrective lenses, poor dentition  Neck: normal range of motion  Respiratory:?clear to auscultation bilaterally, symmetrical chest expansion, unlabored respirations  Cardiovascular:?bradycardic rate and rhythm, pitting edema to BLE (left>right)  Gastrointestinal:?soft, non-tender, no distension, bowel sounds present  Musculoskeletal:?no gross deformity  Integumentary: warm and dry  Neurologic:?awake, alert, oriented, no obvious focal deficits  Psych: cooperative,?good eye contact, normal cognition  Assessment/Plan  IMPRESSION:  1.?FTT (failure to thrive) in adult?R62.7  2.?Sinus bradycardia?R00.1  3.?Chest pain?R07.9  4.?CAD (coronary artery disease)?I25.10  5.?Hypertensive urgency?I16.0  6.?Stage 3 chronic kidney disease?N18.30  7.?AAA (abdominal aortic aneurysm)?I71.4 s/p repair with Dacron graft  8.?History of nephrectomy, right?Z90.5 - partial - due to RCCa  9.?BPH (benign prostatic hyperplasia)?N40.0  10.?Hepatic hemangioma?D18.03  11.?Tobacco abuse?Z72.0  12. Chronic BLE venous stasis  ?  PLAN:  - Cardiology consulted. Recommends compression stockings, periodic leg?elevation, Lasix 20 mg once a day PRN  - Hold AV richard blocking agents  - PRN antihypertensives  - Trend troponin  - Resume appropriate home medications  - Telemetry monitoring  - Labs in AM  ?   DVT Prophylaxis: SCDs  ?  ?   Source of history: Patient and medical records  Present at bedside:?Staff  Referral  source:?ED  History limitation: None  ?   PCP: Chucky Belcher MD  ?   CODE STATUS: Full Code  ?  ?   I, ECTOR Mercer, reviewed and discussed the case with Dr. JACKELINE Du. See addendum for further per MD.?   Problem List/Past Medical History  Hypertension  Hyperlipidemia  Coronary artery disease  Abdominal aortic aneurysm s/p repair  Renal cell carcinoma s/p partial right nephrectomy  Liver hemangiomas  Tobacco abuse  VHD  Procedure/Surgical History  Abdominal Aortic Aneurysmectomy (.) (09/27/2018)  Replacement of Abdominal Aorta with Synthetic Substitute, Open Approach (09/27/2018)  Catheter placement in coronary artery(s) for coronary angiography, including intraprocedural injection(s) for coronary angiography, imaging supervision and interpretation; with left heart catheterization including intraprocedural injection(s) for left frances (09/25/2018)  Fluoroscopy of Left Heart using Low Osmolar Contrast (09/25/2018)  Fluoroscopy of Multiple Coronary Arteries using Low Osmolar Contrast (09/25/2018)  Fluoroscopy of Right Upper Extremity Arteries using Low Osmolar Contrast (09/25/2018)  Introduction of needle or intracatheter, upper or lower extremity artery (09/25/2018)  Measurement of Cardiac Sampling and Pressure, Left Heart, Percutaneous Approach (09/25/2018)  Excision of Right Kidney, Open Approach (02/02/2016)  Nephrectomy (Right) (02/02/2016)  HOSPITAL OBSERVATION SERVICE, PER HOUR (09/15/2013)  HOSPITAL OBSERVATION SERVICE, PER HOUR (09/14/2013)  Colonoscopy  Hernia repair  Lumpectomy   Medications  Home  Aspir 81 oral delayed release tablet, 81 mg= 1 tab(s), Oral, Daily  hydrochlorothiazide 12.5 mg oral tablet, 12.5 mg= 1 tab(s), Oral, Daily,? ?Not taking  isosorbide MONOnitrate 60 mg oral tablet, Extended Release, 60 mg= 1 tab(s), Oral, qAM, 5 refills  lisinopril 20 mg oral tablet, 20 mg= 1 tab(s), Oral, Daily, 5 refills  METOPROLOL SUCC ER 25 MG TAB, 25 mg= 1 tab(s), Oral, Daily  NITROGLYCERIN 0.4 MG  TABLET SL, 1 tab, SL, q5min, PRN  rosuvastatin 40 mg oral tablet, 40 mg= 1 tab(s), Oral, Daily  Allergies  No Known Allergies  Social History  Tobacco - Current smoker; 1 PPD x 50+ years  Alcohol - Denies  Illicit Drugs -?Denies  Family History  Mother: Cancer (unknown)  Father: Cancer (unknown)  Immunizations  Vaccine Date Status Comments   COVID-19 MRNA, LNP-S, PF- Pfizer 09/30/2021 Given    influenza virus vaccine, inactivated 12/03/2018 Given    Lab Results  Labs Last 24 Hours?  ?Chemistry? Hematology/Coagulation?   Sodium Lvl: 144 mmol/L (10/28/21 09:40:00) WBC:?4 x10(3)/mcL?Low (10/28/21 09:40:00)   Potassium Lvl: 4.6 mmol/L (10/28/21 09:40:00) RBC: 4.91 x10(6)/mcL (10/28/21 09:40:00)   Chloride:?111 mmol/L?High (10/28/21 09:40:00) Hgb:?13.6 gm/dL?Low (10/28/21 09:40:00)   CO2: 23 mmol/L (10/28/21 09:40:00) Hct: 45.9 % (10/28/21 09:40:00)   Calcium Lvl: 9.3 mg/dL (10/28/21 09:40:00) Platelet: 183 x10(3)/mcL (10/28/21 09:40:00)   Glucose Lvl: 82 mg/dL (10/28/21 09:40:00) MCV: 93.5 fL (10/28/21 09:40:00)   BUN: 13.1 mg/dL (10/28/21 09:40:00) MCH: 27.7 pg (10/28/21 09:40:00)   Creatinine:?1.39 mg/dL?High (10/28/21 09:40:00) MCHC:?29.6 gm/dL?Low (10/28/21 09:40:00)   Est Creat Clearance Ser: 39.94 mL/min (10/28/21 10:19:50) RDW: 14 % (10/28/21 09:40:00)   eGFR-AA: >60 (10/28/21 09:40:00) MPV: 10.3 fL (10/28/21 09:40:00)   eGFR-GRAHAM: 52 mL/min/1.73 m2 (10/28/21 09:40:00) Abs Neut:?1.62 x10(3)/mcL?Low (10/28/21 09:40:00)   Bili Total: 0.9 mg/dL (10/28/21 09:40:00) Neutro Auto: 40 % (10/28/21 09:40:00)   Bili Direct: 0.3 mg/dL (10/28/21 09:40:00) Lymph Auto: 46 % (10/28/21 09:40:00)   Bili Indirect: 0.6 mg/dL (10/28/21 09:40:00) Mono Auto: 10 % (10/28/21 09:40:00)   AST: 20 unit/L (10/28/21 09:40:00) Eos Auto: 4 % (10/28/21 09:40:00)   ALT: 11 unit/L (10/28/21 09:40:00) Abs Eos: 0.2 x10(3)/mcL (10/28/21 09:40:00)   Alk Phos: 115 unit/L (10/28/21 09:40:00) Basophil Auto: 0 % (10/28/21 09:40:00)   Total Protein: 6.9  gm/dL (10/28/21 09:40:00) Abs Neutro:?1.62 x10(3)/mcL?Low (10/28/21 09:40:00)   Albumin Lvl: 4 gm/dL (10/28/21 09:40:00) Abs Lymph: 1.8 x10(3)/mcL (10/28/21 09:40:00)   Globulin: 2.9 gm/dL (10/28/21 09:40:00) Abs Mono: 0.4 x10(3)/mcL (10/28/21 09:40:00)   A/G Ratio: 1.4 ratio (10/28/21 09:40:00) Abs Baso: 0 x10(3)/mcL (10/28/21 09:40:00)   BNP:?114.1 pg/mL?High (10/28/21 09:40:00)    Troponin-I: 0.023 ng/mL (10/28/21 13:51:00)    POC Troponin: 0.01 ng/mL (10/28/21 10:31:00)    Diagnostic Results  Accession:?[id number]  Order:?CT Abdomen and Pelvis W Contrast  Report Dt/Tm:?10/28/2021 14:40  Report:?  EXAMINATION  CT Abdomen and Pelvis W Contrast  ?  INDICATION  Left upper quadrant pain  ?  Comparison: 18 November, 2015  ?  TECHNIQUE  Helical-acquisition CT images were obtained following the intravenous  administration of iodinated contrast media. Enteric contrast was not  utilized.  Multiplanar reformats were accomplished by a CT technologist at a  separate workstation and pushed to PACS for physician review.  ?  Automated tube current modulation, weight-based exposure dosing,  and/or iterative reconstruction technique utilized to reach lowest  reasonably achievable exposure rate.  DLP: 452 mGy*cm  ?  FINDINGS  Images were reviewed in soft tissue, lung, and bone windows.  ?  Exam quality: adequate  ?  Lines/tubes: None visualized.  ?  There is no acute or suspicious focal abnormality of the included lung  bases and cardiac chambers. No significant pericardial or pleural  fluid.  ?  The gallbladder and bile ducts are unremarkable. Diffusely hypodense  appearance of the liver compatible with steatosis is again noted.  Large right hepatic hemangiomas are unchanged in comparison (series 2,  image 10). Circumscribed simple-appearing left hepatic lobe cyst is  also again noted. No suspicious mass-like liver enhancement is  identified. The portal vein is widely patent.  The pancreas, spleen, and adrenal glands are  unremarkable.  ?  Renal contrast enhancement is temporally symmetric. There are  postoperative changes of partial nephrectomy at the right upper renal  pole. Scattered bilateral cortical scarring is also evident. No new or  otherwise suspicious focal kidney lesion is identified. There are no  findings of high-grade distal obstructive uropathy.  The urinary bladder is mildly distended with diffusely thickened  appearance of the bladder wall. No mass-like focal mural contour  irregularity is appreciated.  The prostate is again enlarged and markedly heterogeneous with  irregular median lobe enhancement. Mass effect upon the bladder floor  is present, as before.  ?  The included lower esophagus is unremarkable. The stomach is  nondilated.  There is no abnormal small bowel dilatation or discrete transition  point to suggest high-grade mechanical obstruction.  The appendix is unremarkable. Moderate volume thecal material is  retained throughout the course of the colon, suggesting element of  constipation. No focal large bowel lesion or acute process is  identified.  There is no free intra-abdominal air or fluid. No drainable  collections.  ?  Diffusely ectatic lower thoracic and abdominal aorta course is again  appreciated. The previously visualized fusiform infrarenal aortic  aneurysm with grossly irregular mural thrombus is less pronounced,  with markedly improved luminal contour. Additionally, there is  smoothing uniformly thickened external margin of the aneurysmal sac  with scattered calcification. Overall appearance could represent  interval postoperative change. The aneurysm now measures approximately  10.2 cm in length with AP x Tx dimensions of 3.4 cm x 3.8 cm; maximal  prior dimensions of 11.7 cm x 3.7 cm x 4.1 cm. No extraluminal  contrast extravasation or other findings suggestive of aneurysmal leak  are appreciated. There are no periaortic inflammatory fat changes.  The remaining visualized abdominopelvic  vascular structures are  without evidence of acute or focal abnormality. Widespread mural  plaquing and calcification noted through the bilateral iliac and  femoral vessels, without visualized high-grade stenosis.  ?  No pathologic lymph node enlargement is identified.  ?  The body wall subcutaneous tissues and regional muscular structures  are without acute or suspicious focal abnormality. There are newly  appreciated midline ventral abdominal wall changes consistent with  interval laparotomy. Right inguinal hernia repair is present, as  before.  There is similar diffusely heterogeneous appearance of the visualized  spinal column with advanced multilevel degenerative changes and  chronic grade 1 L2-3 and grade 2 L3-4 anterolisthesis. No convincing  acute osseous displacement or destructive focal lesion is identified.  ?  IMPRESSION  ?  ??1. No convincing CT evidence of acute abdominopelvic process or new  focal abnormality.  ??2. Interval changes of infrarenal abdominal aortic aneurysm favored  to represent surgical repair, needing correlation with pertinent  clinical details. No definite evidence of acute complication or  periaortic inflammatory changes identified.  ??3. Similar appearance of large right hepatic hemangiomas.  ??4. Interval changes of partial right nephrectomy with no findings of  local tumor recurrence or metastatic disease.  ??5. Markedly enlarged and heterogeneously enhancing prostate  resulting mass effect on the bladder floor, with possibility of  outflow obstruction not excluded.  ??6. Additional chronic secondary details discussed above.  ?  Accession:?[id number]  Order:?XR Chest 2 Views  Report Dt/Tm:?10/28/2021 10:04  Report:?  EXAMINATION  XR Chest 2 Views  ?  INDICATION  Chest Pain  ?  Comparison: 2 August, 2021  ?  FINDINGS  Lines/tubes/devices:  ECG leads overlie the chest.  ?  The cardiomediastinal silhouette and central pulmonary vasculature are  unremarkable.  The trachea is  midline. There is no lobar consolidation, pleural  effusion, or pneumothorax.  ?  There is no acute osseous or extrathoracic abnormality.  ?  IMPRESSION  No acute process or other adverse interval change.      I, Emmanuel Du MD, assumed care of this patient at?18:00 on 10-28-21.  ?  For this patient encounter, I reviewed the midlevel providers documentation, treatment plan, medical decision making and I had face-to-face time with this patient.  ?  History:  CP/abd pain  ?   Physical exam:  above exam is my own  ?   Medical decision making:  above plan is my own  discharge when HR and BP are improved  ?  Critical Care Diagnosis: Hypertensive urgency requiring IV antihypertensives  Critical care interventions: Hands-on evaluation, review of labs, radiographs, medical records and discussion with patient and staff in order to assess and manage the high probability of imminent or life-threatening deterioration of cardio-respiratory status requiring vasopressor support and intubation and mechanical ventilation.  Critical care time spent: 35 minutes.

## 2022-05-03 NOTE — HISTORICAL [facility] CERN[facility]
This is a historical note converted from [employer]. Formatting and pictures may have been removed.  Please reference [employer] for original formatting and attached multimedia. Admit and Discharge Dates  Admit Date: 09/04/2020  Discharge Date: 09/05/2020  Physicians  Attending Physician - Ayush SYED, Michael PALMER  Admitting Physician - Ayush SYED, Michael PALMER  Primary Care Physician - Simi SYED, Chucky DALTON  Discharge Diagnosis  DANDRE on CKD Stage 3  Generalized Weakness  HX: HTN, HLD, CAD/mild, PAD, AAA s/p repair, Renal Mass s/p Nephrectomy, Anxiety, Former tobacco use  Surgical Procedures  No procedures recorded for this visit.  Immunizations  No immunizations recorded for this visit.  Admission Information  Mr. Roman is a 80 year old male with a medical history that includes HTN, HLD, CAD/mild, PAD, AAA s/p repair, renal mass s/p right nephrectomy, and anxiety. He presented to [facility] ER with complaints of generalized weakness over the last several days, after mowing his and his daughters yard on Tuesday. Thinks he may have spent too much time in the sun. Denies fever, chills, night sweats, N/V/D, urinary complaints, CP, SOB, palpitations, muscles aches, change in PO intake/appetite, syncope, or falls. He went to urgent care yesterday for same complaints; he was called today by Urgent Care to come to ER for elevated D Dimer on yesterday labs work.  Initial ER VS: /64, HR 57, respirations 19, oral temp 36.7, and SPO2 99% on RA.? CO2 22, glucose 78, BUN/Cr 39.7/2.32, , troponin and CBC unremarkable.? UA unremarkable.? SARS-CoV-2 not detected.? CXR negative for acute concerns.?BLE Venous NIVA negative for DVT.?Isamar received a total of 2L of NS while in the ER with improvement in his symptoms. Hes been admitted to the hospitalist services for further evaluation and management of DANDRE with generalized weakness. [1]  ?  This is a gentleman has been admitted for?dehydration?and outpatient elevated d-dimer. ?He was treated  with IV fluids?with great improvement and this morning he states that he is feeling great.? As far as his elevated d-dimer his ultrasound bilateral venous was negative?and did not having any?sign or symptom of PE?and he seems to be pretty active?as he was out in the yard working.? As far as his medication goes I have advised him to hold for the next?2-3 days as well as his hydrochlorothiazide before resuming. ?His creatinine improved with IV fluids and since he is feeling well we will go and discharge him back home.? Outpatient follow-up with his primary care physician.  Time Spent on discharge  35 minutes  Objective  Vitals & Measurements  T:?36.7? ?C (Oral)? TMIN:?36.5? ?C (Oral)? TMAX:?36.7? ?C (Oral)? HR:?54(Peripheral)? RR:?20? BP:?114/73? SpO2:?99%? WT:?77.9?kg? BMI:?26.33?  Physical Exam  General: In no acute distress, afebrile  Chest: Clear to auscultation bilaterally  Heart: S1, S2, no appreciable murmur  Abdomen: Soft, nontender, BS +  MSK: Warm, no lower extremity edema, no clubbing or cyanosis  Neurologic: Alert and oriented x4, moving all extremities with good strength  Patient Discharge Condition  Improved and stable  Home   Discharge Medication Reconciliation  Continue  alPRAzolam (alPRAzolam 0.5 mg oral tablet)?1 mg, Oral, Once a day (at bedtime), PRN anxiety  aspirin (Aspir 81 oral delayed release tablet)?81 mg, Oral, Daily  atorvastatin (ATORVASTATIN 20 MG TABLET)?20 mg, Oral, Daily  isosorbide mononitrate (isosorbide MONOnitrate 60 mg oral tablet, Extended Release)?60 mg, Oral, qAM  lisinopril (lisinopril 20 mg oral tablet)?20 mg, Oral, Daily, Notes for Patient: hold for the next 2-3 days  metoprolol (METOPROLOL SUCC ER 25 MG TAB)?25 mg, Oral, Daily  nitroglycerin (NITROGLYCERIN 0.4 MG TABLET SL)?1 tab, SL, q5min, PRN chest pain  Education and Orders Provided  Acute Kidney Injury, Adult  Discharge - 09/05/20 7:43:00 CDT, Home, Give all scheduled vaccinations prior to discharge.?  Discharge  Activity - Activity as Tolerated?  Discharge Diet - Low Sodium?  Follow up  Chucky Belcher MD, within 1 to 2 weeks  Report to Emergency Department if symptoms return or worsen  Car Seat Challenge  No Qualifying Data     [1]?Hospitalist H & P; Zulema Huynh 09/04/2020 20:08 CDT

## 2022-05-03 NOTE — HISTORICAL [facility] CERNER
This is a historical note converted from [employer]. Formatting and pictures may have been removed.  Please reference [employer] for original formatting and attached multimedia. Chief Complaint  Weakness x 4 days with no energy  History of Present Illness  Mr. Roman is a 80 year old male with a medical history that includes HTN, HLD, CAD/mild, PAD, AAA s/p repair, renal mass s/p right nephrectomy, and anxiety. He presented to [facility] ER with complaints of generalized weakness over the last several days, after mowing his and his daughters yard on Tuesday. Thinks he may have spent too much time in the sun. Denies fever, chills, night sweats, N/V/D, urinary complaints, CP, SOB, palpitations, muscles aches, change in PO intake/appetite, syncope, or falls. He went to urgent care yesterday for same complaints; he was called today by Urgent Care to come to ER for elevated D Dimer on yesterday labs work.  Initial ER VS: /64, HR 57, respirations 19, oral temp 36.7, and SPO2 99% on RA.? CO2 22, glucose 78, BUN/Cr 39.7/2.32, , troponin and CBC unremarkable.? UA unremarkable.? SARS-CoV-2 not detected.? CXR negative for acute concerns.?BLE Venous NIVA negative for DVT.?Isamar received a total of 2L of NS while in the ER with improvement in his symptoms. Isamar been admitted to the hospitalist services for further evaluation and management of DANDRE with generalized weakness.  Review of Systems  Except as documented, all other systems reviewed and negative.  Physical Exam  Vitals & Measurements  T:?36.7? ?C (Oral)? HR:?54(Peripheral)? HR:?53(Monitored)? RR:?22? BP:?110/68? SpO2:?99%?  General: Appears comfortable, no acute distress.  Cognition and speech:?Oriented, speech clear and coherent.  HEENT:?Normocephalic, normal hearing, oral mucosa is moist.  Neck:?No JVD, trachea at?midline, supple.  Respiratory:?Lungs CTA.?No accessory muscle use. ?Breath sounds are equal.  Cardiovascular:?Regular rhythm. Normal S1/S2.? No pedal  edema.  Gastrointestinal:?Normoactive bowel sound, soft and non-tender.  Integumentary:?Warm, dry, intact.  Musculoskeletal:?Normal strength, no tenderness, no swelling.  Skin:?Warm and dry, no rashes or lesions.  Neuro:?AAOx3, no focal neurological deficit, normal sensory. Follows commands.  Psych:?Cooperative. Appropriate mood and affect.  ?  Assessment:  DANDRE on CKD Stage 3 - BUN/Cr 39.3/2.32 (baseline Cr 1.6); HX: Right Nephrectomy s/t benign renal mass  Hypoglycemia - glucose 78 --> 61  Generalized Weakness  Elevated D Dimer  HX: HTN, HLD, CAD/mild, PAD, AAA s/p repair, Renal Mass s/p Nephrectomy, Anxiety, Former tobacco use  ?  Plan:?  - AVOID Nephrotoxic agents; Monitor I & Os  - NS @ 125ml/hr  - Accuchekcs q4hr & PRN  - PT consulted to eval treat  - BLE Venous NIVA (-) DVT; consider VQ Scan if respiratory symptoms  - REVIEW/RESUME HOME medications when med list obtained/med rec completed  - Repeat labs in am  ?   Likely DC in am; patient symptoms improved.  ?   Source of history: Self. Medical record.?  Present at bedside: None.?  History limitation: None.  Provider information: PCP:?MAURICE Belcher MD  ?   Code status: Full code  DVT prophylaxis: Lovenox 30mg SC daily?  ?  I, ANIYAH Lo-C, reviewed and discussed the case with Dr. Michael Peacock.  ?  ?  I, Michael Peacock MD, assumed care of this patient at the time of this addendum and assisted with the composition of the above assessment and plan. For this patient encounter, I reviewed the NP or PA documentation, treatment plan, and medical decision making; and I had face-to-face time with this patient. ?Labs and imaging were reviewed and I agree with history, physical and medical decision making as detailed above.??  ?  80-year-old male?presented to?urgent care clinic yesterday for generalized weakness?after working in the yard?at both his house and his daughters house, likely was dehydrated and a bit exhausted. ?They obtained a d-dimer for  unclear reasons, and called in today stating he was elevated and to come to the ER.? He did have acute kidney injury on arrival, consistent with dehydration. ?His CK was normal.? He had no respiratory symptoms, no hypoxia or chest pain or any indication for CTA.? He did undergo bilateral lower showed a venous ultrasound which showed no evidence of DVTs. ?Due to his dehydration AK Is and admitted to the hospitalist service for IV fluids.? His blood glucose is marginally low in the ER but?this has resolved since?eating.   Problem List/Past Medical History  Hypertension  Hyperlipidemia  CAD/mild, diffuse  PAD  AAA s/p repair  CKD Stage 3  Renal Mass s/p Right Nephrectomy  Anxiety  Former Tobacco Use  Procedure/Surgical History  Excision of Right Kidney, Open Approach (02/02/2016)  Nephrectomy (Right) (02/02/2016)  Colonoscopy  Hernia repair  Lumpectomy   AAA repair  Coronary Angiogram  Medications  Inpatient  NS 1,000 mL, 1000 mL, IV  NS 1,000 mL, 1000 mL, IV  Home  Aspir 81 oral delayed release tablet, 81 mg= 1 tab(s), Oral, Daily  ATORVASTATIN 20 MG TABLET, 20 mg= 1 tab(s), Oral, Daily  isosorbide MONOnitrate 60 mg oral tablet, Extended Release, 60 mg= 1 tab(s), Oral, qAM, 5 refills  lisinopril 20 mg oral tablet, 20 mg= 1 tab(s), Oral, Daily, 5 refills  METOPROLOL SUCC ER 25 MG TAB, 25 mg= 1 tab(s), Oral, Daily  NITROGLYCERIN 0.4 MG TABLET SL, 1 tab, SL, q5min, PRN  Allergies  No Known Allergies  Social History  Denies EtOH or illicit drug use.  Former tobacco use; quit years ago, unable to quantify.  Family History  Reviewed and negative.  Lab Results  Labs Last 24 Hours?  ?Chemistry? Hematology/Coagulation?   Sodium Lvl: 139 mmol/L (09/04/20 13:27:00) WBC:?4 x10(3)/mcL?Low (09/04/20 13:27:00)   Potassium Lvl: 4.8 mmol/L (09/04/20 13:27:00) RBC:?4.54 x10(6)/mcL?Low (09/04/20 13:27:00)   Chloride: 107 mmol/L (09/04/20 13:27:00) Hgb:?12.7 gm/dL?Low (09/04/20 13:27:00)   CO2:?22 mmol/L?Low (09/04/20 13:27:00) Hct:?41.8  %?Low (09/04/20 13:27:00)   Calcium Lvl: 9 mg/dL (09/04/20 13:27:00) Platelet: 222 x10(3)/mcL (09/04/20 13:27:00)   Glucose Lvl:?78 mg/dL?Low (09/04/20 13:27:00) MCV: 92.1 fL (09/04/20 13:27:00)   BUN:?39.3 mg/dL?High (09/04/20 13:27:00) MCH: 28 pg (09/04/20 13:27:00)   Creatinine:?2.32 mg/dL?High (09/04/20 13:27:00) MCHC:?30.4 gm/dL?Low (09/04/20 13:27:00)   eGFR-AA: 35 (09/04/20 13:27:00) RDW: 13.2 % (09/04/20 13:27:00)   eGFR-GRAHAM: 29 (09/04/20 13:27:00) MPV: 10.8 fL (09/04/20 13:27:00)   Bili Total: 0.6 mg/dL (09/04/20 13:27:00) Abs Neut:?1.73 x10(3)/mcL?Low (09/04/20 13:27:00)   Bili Direct: 0.2 mg/dL (09/04/20 13:27:00) Neutro Auto: 43 % (09/04/20 13:27:00)   Bili Indirect: 0.4 mg/dL (09/04/20 13:27:00) Lymph Auto: 43 % (09/04/20 13:27:00)   AST: 18 unit/L (09/04/20 13:27:00) Mono Auto: 11 % (09/04/20 13:27:00)   ALT: 9 unit/L (09/04/20 13:27:00) Eos Auto: 2 % (09/04/20 13:27:00)   Alk Phos: 126 unit/L (09/04/20 13:27:00) Abs Eos: 0.1 x10(3)/mcL (09/04/20 13:27:00)   Total Protein: 7 gm/dL (09/04/20 13:27:00) Basophil Auto: 0 % (09/04/20 13:27:00)   Albumin Lvl: 3.9 gm/dL (09/04/20 13:27:00) Abs Neutro:?1.73 x10(3)/mcL?Low (09/04/20 13:27:00)   Globulin: 3.1 gm/dL (09/04/20 13:27:00) Abs Lymph: 1.7 x10(3)/mcL (09/04/20 13:27:00)   A/G Ratio: 1.3 ratio (09/04/20 13:27:00) Abs Mono: 0.4 x10(3)/mcL (09/04/20 13:27:00)   BNP: <10.0 (09/04/20 13:27:00) Abs Baso: 0 x10(3)/mcL (09/04/20 13:27:00)   Total CK: 182 U/L (09/04/20 13:27:00)    Troponin-I: 0.011 ng/mL (09/04/20 13:27:00)    Diagnostic Results  Order:?XR Chest 2 Views  Report Dt/Tm:?09/04/2020 13:57  Findings  Lungs are clear with no visualized focal airspace opacity.  The trachea appears midline.  The cardiomediastinal silhouette is within normal limits.  There is no evidence of pneumothorax or pleural effusion.  Visualized abdomen, soft tissues, and osseous structures are  unremarkable.  Impression  No acute cardiopulmonary process.  ?  BLE Venous NIVA  9/4/20:?  There was no evidence of deep or superficial vein thrombosis in bilateral lower extremities.

## 2022-06-12 ENCOUNTER — HOSPITAL ENCOUNTER (EMERGENCY)
Facility: HOSPITAL | Age: 82
Discharge: HOME | End: 2022-06-13
Attending: EMERGENCY MEDICINE
Payer: MEDICARE

## 2022-06-12 DIAGNOSIS — R03.0 ELEVATED BLOOD PRESSURE READING: Primary | ICD-10-CM

## 2022-06-12 DIAGNOSIS — I10 HYPERTENSION, UNSPECIFIED TYPE: ICD-10-CM

## 2022-06-12 PROCEDURE — 99283 EMERGENCY DEPT VISIT LOW MDM: CPT

## 2022-06-13 VITALS
RESPIRATION RATE: 18 BRPM | OXYGEN SATURATION: 97 % | SYSTOLIC BLOOD PRESSURE: 168 MMHG | DIASTOLIC BLOOD PRESSURE: 97 MMHG | WEIGHT: 157 LBS | TEMPERATURE: 98 F | HEIGHT: 68 IN | BODY MASS INDEX: 23.79 KG/M2 | HEART RATE: 62 BPM

## 2022-06-13 RX ORDER — CLONIDINE HYDROCHLORIDE 0.1 MG/1
0.1 TABLET ORAL EVERY 6 HOURS PRN
Qty: 30 TABLET | Refills: 0 | Status: SHIPPED | OUTPATIENT
Start: 2022-06-13 | End: 2022-08-06

## 2022-06-13 NOTE — ED PROVIDER NOTES
"Encounter Date: 6/12/2022    SCRIBE #1 NOTE: I, Margaritajosephine Herrera, am scribing for, and in the presence of,  Hector Cedillo MD. I have scribed the following portions of the note - Other sections scribed: HPI, ROS, PE.       History     Chief Complaint   Patient presents with    Hypertension     HTN at home 180/104. Took night meds. With EMS all 150 systolic. No complaints     83 y/o male, with a history of HTN, presents to the ED for complaints of headache last night with elevated blood pressure at home. Pt states he feels "fine now." He notes his is compliant with 3 HTN medications, metoprolol 25 mg QD, hydralazine 25 mg BID, and valsartan 320 mg QD. Pt sees a Dr. Barroso.    The history is provided by the patient. No  was used.   Hypertension   This is a recurrent problem. The current episode started yesterday. The problem has been resolved. Associated symptoms include headaches. Pertinent negatives include no chest pain, no palpitations, no dizziness and no shortness of breath.     Review of patient's allergies indicates:  No Known Allergies  No past medical history on file.  No past surgical history on file.  No family history on file.     Review of Systems   Constitutional: Negative for activity change, chills, diaphoresis, fatigue and fever.   HENT: Negative for congestion, ear pain, sinus pain and sore throat.    Eyes: Negative for visual disturbance.   Respiratory: Negative for cough, shortness of breath, wheezing and stridor.    Cardiovascular: Negative for chest pain, palpitations and leg swelling.   Gastrointestinal: Negative for abdominal pain, constipation, diarrhea, nausea, rectal pain and vomiting.   Genitourinary: Negative for dysuria and hematuria.   Musculoskeletal: Negative for arthralgias, back pain and myalgias.   Skin: Negative for rash.   Neurological: Positive for headaches. Negative for dizziness, syncope, weakness and numbness.   All other systems reviewed and are " negative.      Physical Exam     Initial Vitals   BP Pulse Resp Temp SpO2   06/12/22 2008 06/12/22 2008 06/12/22 2008 06/13/22 0247 06/12/22 2008   (!) 150/80 73 18 98.4 °F (36.9 °C) 98 %      MAP       --                Physical Exam    Nursing note and vitals reviewed.  Constitutional: He appears well-developed and well-nourished. No distress.   HENT:   Head: Normocephalic and atraumatic.   Eyes: EOM are normal.   Neck: Trachea normal. Neck supple.   Normal range of motion.  Cardiovascular: Normal rate and regular rhythm.   No murmur heard.  Pulmonary/Chest: Breath sounds normal. No respiratory distress.   Abdominal: Abdomen is soft. Bowel sounds are normal. He exhibits no distension. There is no abdominal tenderness. There is no rebound and no guarding.   Musculoskeletal:         General: Normal range of motion.      Cervical back: Normal range of motion and neck supple.      Lumbar back: Normal.     Neurological: He is alert and oriented to person, place, and time. He has normal strength.   Skin: Skin is warm and dry. No rash noted.   Psychiatric: He has a normal mood and affect.         ED Course   Procedures  Labs Reviewed - No data to display       Imaging Results    None          Medications - No data to display             Attending Attestation:           Physician Attestation for Scribe:  Physician Attestation Statement for Scribe #1: I, reviewed documentation, as scribed by Margarita Herrera in my presence, and it is both accurate and complete.                      Clinical Impression:   Final diagnoses:  [R03.0] Elevated blood pressure reading (Primary)  [I10] Hypertension, unspecified type          ED Disposition Condition    Discharge Stable        ED Prescriptions     Medication Sig Dispense Start Date End Date Auth. Provider    cloNIDine (CATAPRES) 0.1 MG tablet Take 1 tablet (0.1 mg total) by mouth every 6 (six) hours as needed (For systolic blood pressure greater than 175). 30 tablet 6/13/2022   Hector Cedillo MD        Follow-up Information    None          Hector Cedillo MD  06/13/22 0004

## 2022-08-05 ENCOUNTER — HOSPITAL ENCOUNTER (OUTPATIENT)
Facility: HOSPITAL | Age: 82
Discharge: HOME | End: 2022-08-06
Attending: EMERGENCY MEDICINE | Admitting: HOSPITALIST
Payer: MEDICARE

## 2022-08-05 DIAGNOSIS — R07.9 CHEST PAIN, RULE OUT ACUTE MYOCARDIAL INFARCTION: Primary | ICD-10-CM

## 2022-08-05 DIAGNOSIS — R07.9 CHEST PAIN: ICD-10-CM

## 2022-08-05 DIAGNOSIS — R42 DIZZINESS: ICD-10-CM

## 2022-08-05 LAB
ALBUMIN SERPL-MCNC: 3.9 GM/DL (ref 3.4–4.8)
ALBUMIN/GLOB SERPL: 1.2 RATIO (ref 1.1–2)
ALP SERPL-CCNC: 145 UNIT/L (ref 40–150)
ALT SERPL-CCNC: 11 UNIT/L (ref 0–55)
APPEARANCE UR: CLEAR
AST SERPL-CCNC: 17 UNIT/L (ref 5–34)
BACTERIA #/AREA URNS AUTO: NORMAL /HPF
BASOPHILS # BLD AUTO: 0.01 X10(3)/MCL (ref 0–0.2)
BASOPHILS NFR BLD AUTO: 0.3 %
BILIRUB UR QL STRIP.AUTO: NEGATIVE MG/DL
BILIRUBIN DIRECT+TOT PNL SERPL-MCNC: 0.7 MG/DL
BNP BLD-MCNC: 13.7 PG/ML
BUN SERPL-MCNC: 12.7 MG/DL (ref 8.4–25.7)
CALCIUM SERPL-MCNC: 9.2 MG/DL (ref 8.8–10)
CHLORIDE SERPL-SCNC: 109 MMOL/L (ref 98–107)
CO2 SERPL-SCNC: 27 MMOL/L (ref 23–31)
COLOR UR AUTO: YELLOW
CREAT SERPL-MCNC: 1.38 MG/DL (ref 0.73–1.18)
EOSINOPHIL # BLD AUTO: 0.04 X10(3)/MCL (ref 0–0.9)
EOSINOPHIL NFR BLD AUTO: 1.1 %
ERYTHROCYTE [DISTWIDTH] IN BLOOD BY AUTOMATED COUNT: 13.4 % (ref 11.5–17)
GLOBULIN SER-MCNC: 3.3 GM/DL (ref 2.4–3.5)
GLUCOSE SERPL-MCNC: 99 MG/DL (ref 82–115)
GLUCOSE UR QL STRIP.AUTO: NEGATIVE MG/DL
HCT VFR BLD AUTO: 46.9 % (ref 42–52)
HGB BLD-MCNC: 14.2 GM/DL (ref 14–18)
IMM GRANULOCYTES # BLD AUTO: 0 X10(3)/MCL (ref 0–0.04)
IMM GRANULOCYTES NFR BLD AUTO: 0 %
KETONES UR QL STRIP.AUTO: NEGATIVE MG/DL
LEUKOCYTE ESTERASE UR QL STRIP.AUTO: NEGATIVE UNIT/L
LYMPHOCYTES # BLD AUTO: 1.04 X10(3)/MCL (ref 0.6–4.6)
LYMPHOCYTES NFR BLD AUTO: 27.6 %
MCH RBC QN AUTO: 28.1 PG (ref 27–31)
MCHC RBC AUTO-ENTMCNC: 30.3 MG/DL (ref 33–36)
MCV RBC AUTO: 92.9 FL (ref 80–94)
MONOCYTES # BLD AUTO: 0.3 X10(3)/MCL (ref 0.1–1.3)
MONOCYTES NFR BLD AUTO: 8 %
NEUTROPHILS # BLD AUTO: 2.4 X10(3)/MCL (ref 2.1–9.2)
NEUTROPHILS NFR BLD AUTO: 63 %
NITRITE UR QL STRIP.AUTO: NEGATIVE
NRBC BLD AUTO-RTO: 0 %
PH UR STRIP.AUTO: 5.5 [PH]
PLATELET # BLD AUTO: 199 X10(3)/MCL (ref 130–400)
PMV BLD AUTO: 10.1 FL (ref 7.4–10.4)
POTASSIUM SERPL-SCNC: 3.6 MMOL/L (ref 3.5–5.1)
PROT SERPL-MCNC: 7.2 GM/DL (ref 5.8–7.6)
PROT UR QL STRIP.AUTO: NEGATIVE MG/DL
RBC # BLD AUTO: 5.05 X10(6)/MCL (ref 4.7–6.1)
RBC #/AREA URNS AUTO: <5 /HPF
RBC UR QL AUTO: NEGATIVE UNIT/L
SARS-COV-2 RDRP RESP QL NAA+PROBE: NEGATIVE
SODIUM SERPL-SCNC: 143 MMOL/L (ref 136–145)
SP GR UR STRIP.AUTO: 1.01 (ref 1–1.03)
SQUAMOUS #/AREA URNS AUTO: <5 /HPF
TROPONIN I SERPL-MCNC: 0.02 NG/ML (ref 0–0.04)
TROPONIN I SERPL-MCNC: 0.03 NG/ML (ref 0–0.04)
UROBILINOGEN UR STRIP-ACNC: 0.2 MG/DL
WBC # SPEC AUTO: 3.8 X10(3)/MCL (ref 4.5–11.5)
WBC #/AREA URNS AUTO: <5 /HPF

## 2022-08-05 PROCEDURE — 25000003 PHARM REV CODE 250: Performed by: NURSE PRACTITIONER

## 2022-08-05 PROCEDURE — 81001 URINALYSIS AUTO W/SCOPE: CPT | Performed by: PHYSICIAN ASSISTANT

## 2022-08-05 PROCEDURE — 63600175 PHARM REV CODE 636 W HCPCS: Performed by: NURSE PRACTITIONER

## 2022-08-05 PROCEDURE — 83880 ASSAY OF NATRIURETIC PEPTIDE: CPT | Performed by: PHYSICIAN ASSISTANT

## 2022-08-05 PROCEDURE — 85025 COMPLETE CBC W/AUTO DIFF WBC: CPT | Performed by: PHYSICIAN ASSISTANT

## 2022-08-05 PROCEDURE — G0378 HOSPITAL OBSERVATION PER HR: HCPCS

## 2022-08-05 PROCEDURE — 93010 EKG 12-LEAD: ICD-10-PCS | Mod: ,,, | Performed by: INTERNAL MEDICINE

## 2022-08-05 PROCEDURE — 36415 COLL VENOUS BLD VENIPUNCTURE: CPT | Performed by: PHYSICIAN ASSISTANT

## 2022-08-05 PROCEDURE — 84484 ASSAY OF TROPONIN QUANT: CPT | Performed by: PHYSICIAN ASSISTANT

## 2022-08-05 PROCEDURE — 96372 THER/PROPH/DIAG INJ SC/IM: CPT | Mod: 59 | Performed by: NURSE PRACTITIONER

## 2022-08-05 PROCEDURE — 96374 THER/PROPH/DIAG INJ IV PUSH: CPT

## 2022-08-05 PROCEDURE — 80053 COMPREHEN METABOLIC PANEL: CPT | Performed by: PHYSICIAN ASSISTANT

## 2022-08-05 PROCEDURE — 93005 ELECTROCARDIOGRAM TRACING: CPT

## 2022-08-05 PROCEDURE — 99285 EMERGENCY DEPT VISIT HI MDM: CPT | Mod: 25,CS

## 2022-08-05 PROCEDURE — 87635 SARS-COV-2 COVID-19 AMP PRB: CPT | Performed by: PHYSICIAN ASSISTANT

## 2022-08-05 PROCEDURE — 93010 ELECTROCARDIOGRAM REPORT: CPT | Mod: ,,, | Performed by: INTERNAL MEDICINE

## 2022-08-05 PROCEDURE — 25000003 PHARM REV CODE 250: Performed by: PHYSICIAN ASSISTANT

## 2022-08-05 RX ORDER — IBUPROFEN 200 MG
16 TABLET ORAL
Status: DISCONTINUED | OUTPATIENT
Start: 2022-08-05 | End: 2022-08-06 | Stop reason: HOSPADM

## 2022-08-05 RX ORDER — NITROGLYCERIN 0.4 MG/1
0.4 TABLET SUBLINGUAL EVERY 5 MIN PRN
COMMUNITY

## 2022-08-05 RX ORDER — HYDRALAZINE HYDROCHLORIDE 25 MG/1
25 TABLET, FILM COATED ORAL 2 TIMES DAILY
Status: ON HOLD | COMMUNITY
End: 2022-08-06 | Stop reason: SDUPTHER

## 2022-08-05 RX ORDER — ASPIRIN 81 MG/1
81 TABLET ORAL DAILY
COMMUNITY

## 2022-08-05 RX ORDER — SODIUM CHLORIDE 0.9 % (FLUSH) 0.9 %
10 SYRINGE (ML) INJECTION EVERY 12 HOURS PRN
Status: DISCONTINUED | OUTPATIENT
Start: 2022-08-05 | End: 2022-08-06 | Stop reason: HOSPADM

## 2022-08-05 RX ORDER — METOPROLOL SUCCINATE 25 MG/1
12.5 TABLET, EXTENDED RELEASE ORAL DAILY
Status: ON HOLD | COMMUNITY
End: 2022-08-06 | Stop reason: SDUPTHER

## 2022-08-05 RX ORDER — IBUPROFEN 200 MG
24 TABLET ORAL
Status: DISCONTINUED | OUTPATIENT
Start: 2022-08-05 | End: 2022-08-06 | Stop reason: HOSPADM

## 2022-08-05 RX ORDER — TALC
6 POWDER (GRAM) TOPICAL NIGHTLY PRN
Status: DISCONTINUED | OUTPATIENT
Start: 2022-08-05 | End: 2022-08-06

## 2022-08-05 RX ORDER — SIMETHICONE 80 MG
1 TABLET,CHEWABLE ORAL 4 TIMES DAILY PRN
Status: DISCONTINUED | OUTPATIENT
Start: 2022-08-05 | End: 2022-08-06 | Stop reason: HOSPADM

## 2022-08-05 RX ORDER — ASPIRIN 325 MG
325 TABLET, DELAYED RELEASE (ENTERIC COATED) ORAL
Status: DISCONTINUED | OUTPATIENT
Start: 2022-08-05 | End: 2022-08-05

## 2022-08-05 RX ORDER — HYDRALAZINE HYDROCHLORIDE 20 MG/ML
10 INJECTION INTRAMUSCULAR; INTRAVENOUS EVERY 4 HOURS PRN
Status: DISCONTINUED | OUTPATIENT
Start: 2022-08-05 | End: 2022-08-06

## 2022-08-05 RX ORDER — VALSARTAN 320 MG/1
320 TABLET ORAL DAILY
COMMUNITY

## 2022-08-05 RX ORDER — ASPIRIN 325 MG
325 TABLET ORAL
Status: COMPLETED | OUTPATIENT
Start: 2022-08-05 | End: 2022-08-05

## 2022-08-05 RX ORDER — LISINOPRIL 20 MG/1
20 TABLET ORAL DAILY
COMMUNITY
End: 2022-08-06

## 2022-08-05 RX ORDER — ONDANSETRON 2 MG/ML
4 INJECTION INTRAMUSCULAR; INTRAVENOUS EVERY 6 HOURS PRN
Status: DISCONTINUED | OUTPATIENT
Start: 2022-08-05 | End: 2022-08-06 | Stop reason: HOSPADM

## 2022-08-05 RX ORDER — ISOSORBIDE DINITRATE 20 MG/1
20 TABLET ORAL 2 TIMES DAILY
Status: ON HOLD | COMMUNITY
End: 2023-07-23 | Stop reason: HOSPADM

## 2022-08-05 RX ORDER — HYDROCODONE BITARTRATE AND ACETAMINOPHEN 5; 325 MG/1; MG/1
1 TABLET ORAL EVERY 6 HOURS PRN
Status: DISCONTINUED | OUTPATIENT
Start: 2022-08-05 | End: 2022-08-06 | Stop reason: HOSPADM

## 2022-08-05 RX ORDER — FUROSEMIDE 20 MG/1
20 TABLET ORAL DAILY PRN
COMMUNITY

## 2022-08-05 RX ORDER — NALOXONE HCL 0.4 MG/ML
0.02 VIAL (ML) INJECTION
Status: DISCONTINUED | OUTPATIENT
Start: 2022-08-05 | End: 2022-08-06 | Stop reason: HOSPADM

## 2022-08-05 RX ORDER — ENOXAPARIN SODIUM 100 MG/ML
40 INJECTION SUBCUTANEOUS EVERY 24 HOURS
Status: DISCONTINUED | OUTPATIENT
Start: 2022-08-05 | End: 2022-08-06 | Stop reason: HOSPADM

## 2022-08-05 RX ORDER — GLUCAGON 1 MG
1 KIT INJECTION
Status: DISCONTINUED | OUTPATIENT
Start: 2022-08-05 | End: 2022-08-06 | Stop reason: HOSPADM

## 2022-08-05 RX ORDER — ATORVASTATIN CALCIUM 80 MG/1
80 TABLET, FILM COATED ORAL DAILY
Status: ON HOLD | COMMUNITY
End: 2022-11-11

## 2022-08-05 RX ORDER — ACETAMINOPHEN 325 MG/1
650 TABLET ORAL EVERY 4 HOURS PRN
Status: DISCONTINUED | OUTPATIENT
Start: 2022-08-05 | End: 2022-08-06 | Stop reason: HOSPADM

## 2022-08-05 RX ADMIN — ASPIRIN 325 MG ORAL TABLET 325 MG: 325 PILL ORAL at 11:08

## 2022-08-05 RX ADMIN — ENOXAPARIN SODIUM 40 MG: 100 INJECTION SUBCUTANEOUS at 08:08

## 2022-08-05 RX ADMIN — HYDRALAZINE HYDROCHLORIDE 10 MG: 20 INJECTION, SOLUTION INTRAMUSCULAR; INTRAVENOUS at 09:08

## 2022-08-05 RX ADMIN — ACETAMINOPHEN 325MG 650 MG: 325 TABLET ORAL at 09:08

## 2022-08-05 NOTE — ED PROVIDER NOTES
Encounter Date: 8/5/2022    SCRIBE #1 NOTE: I, Gina Adairox, am scribing for, and in the presence of,  Liza Pradhan. I have scribed the following portions of the note - Other sections scribed: HPI,ROS,PE.       History     Chief Complaint   Patient presents with    Shortness of Breath    Weakness    Chest Pain     Pt presents c/o sob/cp/weakness. Onset yesterday. Denies cardiac stent.       81 y/o male with a history of hypertension presents to the ED c/o dizziness, chest pain (tightness), and SOB onset around 1700 yesterday. Pt states his symptoms began yesterday evening and worsened throughout the night. He says he woke up weak and had a headache this morning but his other symptoms had went away. Pt states he's feeling much better now.    Cardiologist: Dr. Mynor Cruz    The history is provided by the patient. No  was used.   Shortness of Breath  This is a new problem. The problem occurs rarely.The current episode started yesterday. The problem has been resolved. Associated symptoms include headaches and chest pain. Pertinent negatives include no fever, no sore throat, no cough, no vomiting, no abdominal pain, no rash and no leg swelling.   Chest Pain  The current episode started yesterday. Chest pain occurs rarely. The chest pain is resolved. The quality of the pain is described as tightness. The pain does not radiate. Primary symptoms include shortness of breath and dizziness. Pertinent negatives for primary symptoms include no fever, no cough, no abdominal pain, no nausea and no vomiting.   Dizziness also occurs with weakness. Dizziness does not occur with nausea or vomiting.   Associated symptoms include weakness. Risk factors include being elderly.   His past medical history is significant for hypertension.   Pertinent negatives for past medical history include no seizures.     Review of patient's allergies indicates:  No Known Allergies  Past Medical History:   Diagnosis Date     HLD (hyperlipidemia)     Hypertension      History reviewed. No pertinent surgical history.  History reviewed. No pertinent family history.     Review of Systems   Constitutional: Negative for appetite change, chills and fever.   HENT: Negative for congestion and sore throat.    Eyes: Negative for pain and visual disturbance.   Respiratory: Positive for shortness of breath. Negative for cough.    Cardiovascular: Positive for chest pain. Negative for leg swelling.   Gastrointestinal: Negative for abdominal pain, diarrhea, nausea and vomiting.   Genitourinary: Negative for dysuria and hematuria.   Skin: Negative for rash and wound.   Allergic/Immunologic: Negative for food allergies and immunocompromised state.   Neurological: Positive for dizziness, weakness and headaches. Negative for seizures and syncope.       Physical Exam     Initial Vitals [08/05/22 1136]   BP Pulse Resp Temp SpO2   (!) 162/93 68 18 98.1 °F (36.7 °C) 97 %      MAP       --         Physical Exam    Nursing note and vitals reviewed.  Constitutional: He appears well-developed and well-nourished. He is not diaphoretic. He does not appear ill. No distress.   HENT:   Head: Normocephalic and atraumatic.   Right Ear: External ear normal.   Left Ear: External ear normal.   Nose: Nose normal.   Mouth/Throat: Oropharynx is clear and moist.   Eyes: Conjunctivae and EOM are normal. Pupils are equal, round, and reactive to light.   Neck: Neck supple. No tracheal deviation present.   Cardiovascular: Regular rhythm, normal heart sounds and intact distal pulses. Bradycardia present.    No murmur heard.  Pulmonary/Chest: Breath sounds normal. No respiratory distress. He has no wheezes. He has no rhonchi. He has no rales.   Abdominal: Abdomen is soft. Bowel sounds are normal. He exhibits no distension. There is no abdominal tenderness.   No right CVA tenderness.  No left CVA tenderness.   Musculoskeletal:         General: No edema. Normal range of motion.       Cervical back: Neck supple.     Neurological: He is alert and oriented to person, place, and time. He has normal strength. No cranial nerve deficit or sensory deficit. GCS score is 15. GCS eye subscore is 4. GCS verbal subscore is 5. GCS motor subscore is 6.   Skin: Skin is warm and dry. Capillary refill takes less than 2 seconds.   Psychiatric: He has a normal mood and affect. His mood appears not anxious.         ED Course   Procedures  Labs Reviewed   COMPREHENSIVE METABOLIC PANEL - Abnormal; Notable for the following components:       Result Value    Chloride 109 (*)     Creatinine 1.38 (*)     All other components within normal limits   CBC WITH DIFFERENTIAL - Abnormal; Notable for the following components:    WBC 3.8 (*)     MCHC 30.3 (*)     All other components within normal limits   TROPONIN I - Normal   TROPONIN I - Normal   B-TYPE NATRIURETIC PEPTIDE - Normal   URINALYSIS, REFLEX TO URINE CULTURE - Normal   SARS-COV-2 RNA AMPLIFICATION, QUAL - Normal   URINALYSIS, MICROSCOPIC - Normal   CBC W/ AUTO DIFFERENTIAL    Narrative:     The following orders were created for panel order CBC auto differential.  Procedure                               Abnormality         Status                     ---------                               -----------         ------                     CBC with Differential[[id number]]        Abnormal            Final result                 Please view results for these tests on the individual orders.     EKG Readings: (Independently Interpreted)   Initial Reading: No STEMI. Rhythm: Normal Sinus Rhythm. Heart Rate: 61. Ectopy: No Ectopy. ST Segments: Normal ST Segments. T Waves: Normal. Axis: Normal. Clinical Impression: Left Ventricular Hypertrophy (LDH)       Imaging Results          X-Ray Chest PA And Lateral (Final result)  Result time 08/05/22 12:11:47    Final result by Iain Bueno MD (08/05/22 12:11:47)                 Impression:      No acute cardiopulmonary process  identified.      Electronically signed by: Iain Bueno  Date:    08/05/2022  Time:    12:11             Narrative:    EXAMINATION:  XR CHEST PA AND LATERAL    CLINICAL HISTORY:  Chest Pain;    TECHNIQUE:  Two views    COMPARISON:  February 5, 2022.    FINDINGS:  Cardiopericardial silhouette is within normal limits.  No acute dense focal or segmental consolidation, congestion, pleural effusion or pneumothorax.                                 Medications      aspirin tablet 325 mg (325 mg Oral Given 8/5/22 1145)     Medical Decision Making:   History:   Old Medical Records: I decided to obtain old medical records.  Old Records Summarized: other records.       <> Summary of Records: Last cath 2018   Initial Assessment:   81 yo male with episode of chest tightness, SOB and dizziness- all resolved now  Independently Interpreted Test(s):   I have ordered and independently interpreted X-rays - see prior notes.  I have ordered and independently interpreted EKG Reading(s) - see prior notes  Clinical Tests:   Lab Tests: Ordered and Reviewed  The following lab test(s) were unremarkable: Troponin and CBC       <> Summary of Lab: Minimal creatinine elevation   Radiological Study: Ordered and Reviewed  ED Management:  Given aspirin  Spoke with hospitalist for admission for serial enzymes   Other:   I have discussed this case with another health care provider.          Scribe Attestation:   Scribe #1: I performed the above scribed service and the documentation accurately describes the services I performed. I attest to the accuracy of the note.    Attending Attestation:           Physician Attestation for Scribe:  Physician Attestation Statement for Scribe #1: I, Liza Pradhan, reviewed documentation, as scribed by Gina Silva in my presence, and it is both accurate and complete.                      Clinical Impression:   Final diagnoses:  [R07.9] Chest pain  [R07.9] Chest pain, rule out acute myocardial infarction  (Primary)  [R42] Dizziness          ED Disposition Condition    Observation               Liza Pradhan MD  08/05/22 2014

## 2022-08-06 VITALS
WEIGHT: 162.94 LBS | DIASTOLIC BLOOD PRESSURE: 88 MMHG | HEIGHT: 68 IN | RESPIRATION RATE: 16 BRPM | SYSTOLIC BLOOD PRESSURE: 138 MMHG | HEART RATE: 73 BPM | OXYGEN SATURATION: 95 % | TEMPERATURE: 99 F | BODY MASS INDEX: 24.7 KG/M2

## 2022-08-06 PROBLEM — R07.9 CHEST PAIN, RULE OUT ACUTE MYOCARDIAL INFARCTION: Status: ACTIVE | Noted: 2022-08-06

## 2022-08-06 LAB
ALBUMIN SERPL-MCNC: 3.6 GM/DL (ref 3.4–4.8)
ALBUMIN/GLOB SERPL: 1.2 RATIO (ref 1.1–2)
ALP SERPL-CCNC: 137 UNIT/L (ref 40–150)
ALT SERPL-CCNC: 10 UNIT/L (ref 0–55)
AST SERPL-CCNC: 15 UNIT/L (ref 5–34)
BASOPHILS # BLD AUTO: 0.01 X10(3)/MCL (ref 0–0.2)
BASOPHILS NFR BLD AUTO: 0.3 %
BILIRUBIN DIRECT+TOT PNL SERPL-MCNC: 0.7 MG/DL
BUN SERPL-MCNC: 11 MG/DL (ref 8.4–25.7)
CALCIUM SERPL-MCNC: 8.8 MG/DL (ref 8.8–10)
CHLORIDE SERPL-SCNC: 109 MMOL/L (ref 98–107)
CO2 SERPL-SCNC: 25 MMOL/L (ref 23–31)
CREAT SERPL-MCNC: 1.16 MG/DL (ref 0.73–1.18)
EOSINOPHIL # BLD AUTO: 0.13 X10(3)/MCL (ref 0–0.9)
EOSINOPHIL NFR BLD AUTO: 3.7 %
ERYTHROCYTE [DISTWIDTH] IN BLOOD BY AUTOMATED COUNT: 13.3 % (ref 11.5–17)
GLOBULIN SER-MCNC: 3 GM/DL (ref 2.4–3.5)
GLUCOSE SERPL-MCNC: 80 MG/DL (ref 82–115)
HCT VFR BLD AUTO: 46 % (ref 42–52)
HGB BLD-MCNC: 14.1 GM/DL (ref 14–18)
IMM GRANULOCYTES # BLD AUTO: 0.01 X10(3)/MCL (ref 0–0.04)
IMM GRANULOCYTES NFR BLD AUTO: 0.3 %
LYMPHOCYTES # BLD AUTO: 1.52 X10(3)/MCL (ref 0.6–4.6)
LYMPHOCYTES NFR BLD AUTO: 42.9 %
MCH RBC QN AUTO: 28.2 PG (ref 27–31)
MCHC RBC AUTO-ENTMCNC: 30.7 MG/DL (ref 33–36)
MCV RBC AUTO: 92 FL (ref 80–94)
MONOCYTES # BLD AUTO: 0.4 X10(3)/MCL (ref 0.1–1.3)
MONOCYTES NFR BLD AUTO: 11.3 %
NEUTROPHILS # BLD AUTO: 1.5 X10(3)/MCL (ref 2.1–9.2)
NEUTROPHILS NFR BLD AUTO: 41.5 %
NRBC BLD AUTO-RTO: 0 %
PLATELET # BLD AUTO: 191 X10(3)/MCL (ref 130–400)
PMV BLD AUTO: 10.7 FL (ref 7.4–10.4)
POTASSIUM SERPL-SCNC: 3.7 MMOL/L (ref 3.5–5.1)
PROT SERPL-MCNC: 6.6 GM/DL (ref 5.8–7.6)
RBC # BLD AUTO: 5 X10(6)/MCL (ref 4.7–6.1)
SODIUM SERPL-SCNC: 142 MMOL/L (ref 136–145)
TROPONIN I SERPL-MCNC: 0.04 NG/ML (ref 0–0.04)
WBC # SPEC AUTO: 3.5 X10(3)/MCL (ref 4.5–11.5)

## 2022-08-06 PROCEDURE — 96376 TX/PRO/DX INJ SAME DRUG ADON: CPT

## 2022-08-06 PROCEDURE — 25000003 PHARM REV CODE 250: Performed by: NURSE PRACTITIONER

## 2022-08-06 PROCEDURE — 63600175 PHARM REV CODE 636 W HCPCS: Performed by: INTERNAL MEDICINE

## 2022-08-06 PROCEDURE — 85025 COMPLETE CBC W/AUTO DIFF WBC: CPT | Performed by: INTERNAL MEDICINE

## 2022-08-06 PROCEDURE — 84484 ASSAY OF TROPONIN QUANT: CPT | Performed by: INTERNAL MEDICINE

## 2022-08-06 PROCEDURE — 97161 PT EVAL LOW COMPLEX 20 MIN: CPT

## 2022-08-06 PROCEDURE — G0378 HOSPITAL OBSERVATION PER HR: HCPCS

## 2022-08-06 PROCEDURE — 25000003 PHARM REV CODE 250: Performed by: INTERNAL MEDICINE

## 2022-08-06 PROCEDURE — 36415 COLL VENOUS BLD VENIPUNCTURE: CPT | Performed by: INTERNAL MEDICINE

## 2022-08-06 PROCEDURE — 80053 COMPREHEN METABOLIC PANEL: CPT | Performed by: INTERNAL MEDICINE

## 2022-08-06 RX ORDER — SODIUM CHLORIDE 0.9 % (FLUSH) 0.9 %
10 SYRINGE (ML) INJECTION
Status: DISCONTINUED | OUTPATIENT
Start: 2022-08-06 | End: 2022-08-06 | Stop reason: HOSPADM

## 2022-08-06 RX ORDER — ASPIRIN 81 MG/1
81 TABLET ORAL DAILY
Status: DISCONTINUED | OUTPATIENT
Start: 2022-08-06 | End: 2022-08-06 | Stop reason: HOSPADM

## 2022-08-06 RX ORDER — HYDRALAZINE HYDROCHLORIDE 20 MG/ML
10 INJECTION INTRAMUSCULAR; INTRAVENOUS
Status: DISCONTINUED | OUTPATIENT
Start: 2022-08-06 | End: 2022-08-06 | Stop reason: HOSPADM

## 2022-08-06 RX ORDER — NITROGLYCERIN 0.4 MG/1
0.4 TABLET SUBLINGUAL EVERY 5 MIN PRN
Status: DISCONTINUED | OUTPATIENT
Start: 2022-08-06 | End: 2022-08-06 | Stop reason: HOSPADM

## 2022-08-06 RX ORDER — ISOSORBIDE DINITRATE 20 MG/1
20 TABLET ORAL 2 TIMES DAILY
Status: DISCONTINUED | OUTPATIENT
Start: 2022-08-06 | End: 2022-08-06

## 2022-08-06 RX ORDER — METOPROLOL SUCCINATE 50 MG/1
50 TABLET, EXTENDED RELEASE ORAL DAILY
Status: DISCONTINUED | OUTPATIENT
Start: 2022-08-07 | End: 2022-08-06 | Stop reason: HOSPADM

## 2022-08-06 RX ORDER — METOPROLOL SUCCINATE 25 MG/1
25 TABLET, EXTENDED RELEASE ORAL DAILY
Qty: 30 TABLET | Refills: 0 | Status: ON HOLD | OUTPATIENT
Start: 2022-08-06 | End: 2023-07-23 | Stop reason: HOSPADM

## 2022-08-06 RX ORDER — TALC
6 POWDER (GRAM) TOPICAL NIGHTLY PRN
Status: DISCONTINUED | OUTPATIENT
Start: 2022-08-06 | End: 2022-08-06 | Stop reason: HOSPADM

## 2022-08-06 RX ORDER — ATORVASTATIN CALCIUM 40 MG/1
80 TABLET, FILM COATED ORAL DAILY
Status: DISCONTINUED | OUTPATIENT
Start: 2022-08-06 | End: 2022-08-06 | Stop reason: HOSPADM

## 2022-08-06 RX ORDER — HYDRALAZINE HYDROCHLORIDE 25 MG/1
25 TABLET, FILM COATED ORAL 2 TIMES DAILY
Status: DISCONTINUED | OUTPATIENT
Start: 2022-08-06 | End: 2022-08-06

## 2022-08-06 RX ORDER — LISINOPRIL 20 MG/1
20 TABLET ORAL DAILY
Status: DISCONTINUED | OUTPATIENT
Start: 2022-08-06 | End: 2022-08-06

## 2022-08-06 RX ORDER — HYDRALAZINE HYDROCHLORIDE 50 MG/1
50 TABLET, FILM COATED ORAL 3 TIMES DAILY
Status: DISCONTINUED | OUTPATIENT
Start: 2022-08-06 | End: 2022-08-06 | Stop reason: HOSPADM

## 2022-08-06 RX ORDER — VALSARTAN 80 MG/1
320 TABLET ORAL DAILY
Status: DISCONTINUED | OUTPATIENT
Start: 2022-08-06 | End: 2022-08-06 | Stop reason: HOSPADM

## 2022-08-06 RX ORDER — HYDRALAZINE HYDROCHLORIDE 25 MG/1
50 TABLET, FILM COATED ORAL 3 TIMES DAILY
Qty: 180 TABLET | Refills: 0 | Status: SHIPPED | OUTPATIENT
Start: 2022-08-06 | End: 2023-07-22

## 2022-08-06 RX ORDER — ISOSORBIDE DINITRATE 20 MG/1
20 TABLET ORAL 3 TIMES DAILY
Status: DISCONTINUED | OUTPATIENT
Start: 2022-08-06 | End: 2022-08-06 | Stop reason: HOSPADM

## 2022-08-06 RX ADMIN — HYDRALAZINE HYDROCHLORIDE 50 MG: 50 TABLET, FILM COATED ORAL at 02:08

## 2022-08-06 RX ADMIN — HYDRALAZINE HYDROCHLORIDE 10 MG: 20 INJECTION, SOLUTION INTRAMUSCULAR; INTRAVENOUS at 08:08

## 2022-08-06 RX ADMIN — ISOSORBIDE DINITRATE 20 MG: 20 TABLET ORAL at 02:08

## 2022-08-06 NOTE — H&P
[facility] Hospital Medicine History & Physical Examination       Patient Name: Cony Roman  MRN: [id number]  Patient Class: OP- Observation   Admission Date: 8/5/2022 11:38 AM  Length of Stay: 0  Admitting Service: Hospital Medicine   Attending Physician: Michael Peacock MD   Primary Care Provider: Chucky Logan II, MD  History source: EMR, patient and/or patient's family    CHIEF COMPLAINT   Shortness of Breath, Weakness, and Chest Pain (Pt presents c/o sob/cp/weakness. Onset yesterday. Denies cardiac stent.  )    HISTORY OF PRESENT ILLNESS:   Patient is a 82-year-old male with history of CAD, AAA/repair, RCC status post right nephrectomy partial, VHD, HTN, HLD who presented to the ER complaining of chest pain that started yesterday.  He reports he would develop symptoms of dizziness and significant chest tightness associated with dyspnea.  It spontaneously resolved on its own yesterday but he woke up again this morning with similar symptoms so decided to come to the ER for further evaluation.  On arrival was afebrile hemodynamically stable laboratory work overall was unremarkable.  EKG showed no signs of ischemia chest x-ray was unremarkable.  Hospitalist service was consulted for admission for further evaluation    PAST MEDICAL HISTORY:   Coronary artery disease  AAA status post repair  RCC status post partial right nephrectomy  Valvular heart disease  Tobacco abuse  Hypertension  Hyperlipidemia    PAST SURGICAL HISTORY:     Past Surgical History:   Procedure Laterality Date    ABDOMINAL SURGERY      NEPHRECTOMY Right        ALLERGIES:   Patient has no known allergies.    FAMILY HISTORY:   Reviewed and non-contributory     SOCIAL HISTORY:   Occasional tobacco use  Denies drug or alcohol use    HOME MEDICATIONS:     aspirin (ECOTRIN) 81 MG EC tablet Take 81 mg by mouth once daily.   atorvastatin (LIPITOR) 80 MG tablet Take 80 mg by mouth once daily.   hydrALAZINE  (APRESOLINE) 25 MG tablet Take 25 mg by mouth 2 (two) times daily.   isosorbide dinitrate (ISORDIL) 20 MG tablet Take 20 mg by mouth 2 (two) times daily.   lisinopriL (PRINIVIL,ZESTRIL) 20 MG tablet Take 20 mg by mouth once daily.   metoprolol succinate (TOPROL-XL) 25 MG 24 hr tablet Take 12.5 mg by mouth once daily.   valsartan (DIOVAN) 320 MG tablet Take 320 mg by mouth once daily.   cloNIDine (CATAPRES) 0.1 MG tablet Take 1 tablet (0.1 mg total) by mouth every 6 (six) hours as needed (For systolic blood pressure greater than 175).   furosemide (LASIX) 20 MG tablet Take 20 mg by mouth daily as needed.   nitroGLYCERIN (NITROSTAT) 0.4 MG SL tablet Place 0.4 mg under the tongue every        REVIEW OF SYSTEMS:   Except as documented, all other systems reviewed and negative     PHYSICAL EXAM:   T 98 °F (36.7 °C)   /66   P 63   RR 17   O2 96 %  GENERAL: awake, alert, oriented and in no acute distress, non-toxic appearing   HEENT: normocephalic atraumatic   NECK: supple   LUNGS: Clear bilaterally, no wheezing or rales, no accessory muscle use   CVS: Regular rate and rhythm, normal peripheral perfusion  ABD: Soft, non-tender, non-distended, bowel sounds present  EXTREMITIES: no clubbing or cyanosis  SKIN: Warm, dry.   NEURO: alert and oriented, grossly without focal deficits   PSYCHIATRIC: Cooperative    LABS AND IMAGING:     Recent Labs     08/05/22  1210   WBC 3.8*   RBC 5.05   HGB 14.2   HCT 46.9   MCV 92.9   MCH 28.1   MCHC 30.3*   RDW 13.4        No results for input(s): LACTIC in the last 72 hours.  No results for input(s): INR, APTT, D-DIMER in the last 72 hours.  No results for input(s): HGBA1C, CHOL, TRIG, LDL, VLDL, HDL in the last 72 hours.   Recent Labs     08/05/22  1210      K 3.6   CHLORIDE 109*   CO2 27   BUN 12.7   CREATININE 1.38*   GLUCOSE 99   CALCIUM 9.2   ALBUMIN 3.9   GLOBULIN 3.3   ALKPHOS 145   ALT 11   AST 17   BILITOT 0.7     Recent Labs     08/05/22  1210 08/05/22  1419   BNP  13.7  --    TROPONINI 0.019 0.025          X-Ray Chest PA And Lateral  Narrative: EXAMINATION:  XR CHEST PA AND LATERAL    CLINICAL HISTORY:  Chest Pain;    TECHNIQUE:  Two views    COMPARISON:  February 5, 2022.    FINDINGS:  Cardiopericardial silhouette is within normal limits.  No acute dense focal or segmental consolidation, congestion, pleural effusion or pneumothorax.  Impression: No acute cardiopulmonary process identified.    Electronically signed by: Iain Bueno  Date:    08/05/2022  Time:    12:11      ASSESSMENT & PLAN:   Chest pain/unstable angina  Uncontrolled hypertension  Coronary artery disease  AAA status post repair  RCC status post partial right nephrectomy  Valvular heart disease  Tobacco abuse  Hypertension  Hyperlipidemia    - tele monitoring   - asa and nitro prn   - trend cardiac enz  - [facility] consult   - resume home meds as appropriate   - prn anti-HTN meds     DVT prophylaxis: lovenox  Code status: full code    If patient was admitted under observational status it is with my approval/permission.     At least 55 min was spent on this history and physical.  Time seen: 1205AM   Michael Peacock MD

## 2022-08-06 NOTE — CONSULTS
Consults   [facility] - 4th Floor Medical Telemetry  Cardiology  Consult Note    Patient Name: Cony Roman  MRN: [id number]  Admission Date: 8/5/2022  Hospital Length of Stay: 0 days  Code Status: Full Code   Attending Provider: Dr. Kamala Kay  Consulting Provider: WILMAN Perez  Primary Care Physician: Chucky Logan II, MD  Principal Problem:Chest pain, rule out acute myocardial infarction    Patient information was obtained from ER records.     Subjective:     Chief Complaint:  Chest Pain     HPI: 82 year old AAM known to Dr. Cruz with a past medical history of HTN, HLD, RRC s/p right partial nephrectomy, Angina, Cavernous Hemangioma of Liver, AAA Repair, and Tobacco Use.  He presented to the ER on 8.5.22 with c/o chest pain that began the day prior.  He reported developing symptoms of dizziness and significant chest tightness with associated dyspnea.  Upon arrival to the ER, EKG revealed no acute ischemic changes.  Patient was admitted to hospital medicine and [facility] has been consulted for further evaluation of chest pain.    Troponin x 3 negative. He is feeling back to his baseline.  No more CP.        PMH: HTN, HLD, RRC, Cavernous Hemangioma of Liver, AAA  PSH: Right Partial Nephrectomy, AAA Repair  Family History: Father - Cancer; Mother - Cancer  Social History: Current Smoker.  Denies Alcohol Use.  Denies Illicit Drug Use.      Previous Cardiac Diagnostics:   Echocardiogram 10.29.21:  The left ventricular ejection fraction is 56%.  E/A flow reversal noted.  Suggestive of diastolic dysfunction.  Normal right ventricle structure and function.  Aortic valve leaflets are mildly thickened.  Trace mitral regurgitation.  There is mild tricuspid regurgitation with RVSP estimated at 38mmHg.  Moderate pulmonic regurgitation present.    Carotid US 9.17.21:  Less than 50% stenosis of the right internal carotid artery.  Less than 50% stenosis of the left internal carotid artery.  Bilateral  antegrade vertebral flow.    PET MPI Stress Test 5.6.21:  This is probably an abnormal perfusion study. There is no evidence of ischemia.   This scan is suggestive of low risk for future cardiovascular events.   Small fixed perfusion abnormality of mild intensity in the apical segment. Small fixed perfusion abnormality of mild intensity in the apical inferior segment.   The left ventricular cavity is noted to be normal on the stress studies. The stress left ventricular ejection fraction was calculated to be 67% and left ventricular global function is normal. The rest left ventricular cavity is noted to be normal. The rest left ventricular ejection fraction was calculated to be 52% and rest left ventricular global function is normal.   When compared to the resting ejection fraction (52%), the stress ejection fraction (67%) has increased.   The study quality is average.     [facility] 9.25.18:  Left main normal.  LAD normal with sluggish distal flow.  LCx normal with sluggish distal flow.  RCA normal with sluggish distal flow.  LVEDP 3, no gradient on pullback across the valve.      Past Medical History:   Diagnosis Date    HLD (hyperlipidemia)     Hypertension        Past Surgical History:   Procedure Laterality Date    ABDOMINAL SURGERY      NEPHRECTOMY Right        Review of patient's allergies indicates:  No Known Allergies    No current facility-administered medications on file prior to encounter.     Current Outpatient Medications on File Prior to Encounter   Medication Sig    aspirin (ECOTRIN) 81 MG EC tablet Take 81 mg by mouth once daily.    atorvastatin (LIPITOR) 80 MG tablet Take 80 mg by mouth once daily.    hydrALAZINE (APRESOLINE) 25 MG tablet Take 25 mg by mouth 2 (two) times daily.    isosorbide dinitrate (ISORDIL) 20 MG tablet Take 20 mg by mouth 2 (two) times daily.    lisinopriL (PRINIVIL,ZESTRIL) 20 MG tablet Take 20 mg by mouth once daily.    metoprolol succinate (TOPROL-XL) 25 MG 24 hr tablet  Take 12.5 mg by mouth once daily.    valsartan (DIOVAN) 320 MG tablet Take 320 mg by mouth once daily.    cloNIDine (CATAPRES) 0.1 MG tablet Take 1 tablet (0.1 mg total) by mouth every 6 (six) hours as needed (For systolic blood pressure greater than 175).    furosemide (LASIX) 20 MG tablet Take 20 mg by mouth daily as needed.    nitroGLYCERIN (NITROSTAT) 0.4 MG SL tablet Place 0.4 mg under the tongue every 5 (five) minutes as needed for Chest pain.     Family History    None       Tobacco Use    Smoking status: Never Smoker    Smokeless tobacco: Never Used   Substance and Sexual Activity    Alcohol use: Never    Drug use: Never    Sexual activity: Not on file       Review of Systems   Constitutional: Negative.    HENT: Negative.    Eyes: Negative.    Respiratory: Positive for shortness of breath.    Cardiovascular: Positive for chest pain.   Genitourinary: Negative.    Musculoskeletal: Negative.    Skin: Negative.    Neurological: Positive for dizziness.   Psychiatric/Behavioral: Negative.        Objective:     Vital Signs (Most Recent):  Temp: 98.3 °F (36.8 °C) (08/06/22 0306)  Pulse: (!) 58 (08/06/22 0306)  Resp: 18 (08/06/22 0306)  BP: (!) 149/69 (08/06/22 0306)  SpO2: 97 % (08/06/22 0306) Vital Signs (24h Range):  Temp:  [98 °F (36.7 °C)-98.6 °F (37 °C)] 98.3 °F (36.8 °C)  Pulse:  [51-68] 58  Resp:  [14-18] 18  SpO2:  [96 %-98 %] 97 %  BP: (136-170)/(66-93) 149/69     Weight: 73.9 kg (162 lb 14.7 oz)  Body mass index is 24.77 kg/m².    SpO2: 97 %  O2 Device (Oxygen Therapy): room air      Intake/Output Summary (Last 24 hours) at 8/6/2022 0638  Last data filed at 8/6/2022 0500  Gross per 24 hour   Intake 360 ml   Output 700 ml   Net -340 ml       Lines/Drains/Airways     Peripheral Intravenous Line  Duration                Peripheral IV - Single Lumen 08/05/22 1645 22 G Left Antecubital <1 day                Significant Labs:  Recent Results (from the past 72 hour(s))   Urinalysis, Reflex to Urine  Culture Urine, Clean Catch    Collection Time: 08/05/22 11:38 AM    Specimen: Urine, Clean Catch   Result Value Ref Range    Color, UA Yellow Yellow, Colorless, Other, Clear    Appearance, UA Clear Clear    Specific Gravity, UA 1.007 1.001 - 1.030    pH, UA 5.5 5.0, 5.5, 6.0, 6.5, 7.0, 7.5, 8.0, 8.5    Protein, UA Negative Negative, 300  mg/dL    Glucose, UA Negative Negative, Normal mg/dL    Ketones, UA Negative Negative, +1, +2, +3, +4, +5, >=160, >=80 mg/dL    Blood, UA Negative Negative unit/L    Bilirubin, UA Negative Negative mg/dL    Urobilinogen, UA 0.2 0.2, 1.0, Normal mg/dL    Nitrites, UA Negative Negative    Leukocyte Esterase, UA Negative Negative, 75  unit/L   COVID-19 Rapid Screening    Collection Time: 08/05/22 11:38 AM   Result Value Ref Range    SARS COV-2 MOLECULAR Negative Negative   Urinalysis, Microscopic    Collection Time: 08/05/22 11:38 AM   Result Value Ref Range    RBC, UA <5 <=5 /HPF    WBC, UA <5 <=5 /HPF    Squamous Epithelial Cells, UA <5 <=5 /HPF    Bacteria, UA None Seen None Seen, Rare, Occasional /HPF   Comprehensive metabolic panel    Collection Time: 08/05/22 12:10 PM   Result Value Ref Range    Sodium Level 143 136 - 145 mmol/L    Potassium Level 3.6 3.5 - 5.1 mmol/L    Chloride 109 (H) 98 - 107 mmol/L    Carbon Dioxide 27 23 - 31 mmol/L    Glucose Level 99 82 - 115 mg/dL    Blood Urea Nitrogen 12.7 8.4 - 25.7 mg/dL    Creatinine 1.38 (H) 0.73 - 1.18 mg/dL    Calcium Level Total 9.2 8.8 - 10.0 mg/dL    Protein Total 7.2 5.8 - 7.6 gm/dL    Albumin Level 3.9 3.4 - 4.8 gm/dL    Globulin 3.3 2.4 - 3.5 gm/dL    Albumin/Globulin Ratio 1.2 1.1 - 2.0 ratio    Bilirubin Total 0.7 <=1.5 mg/dL    Alkaline Phosphatase 145 40 - 150 unit/L    Alanine Aminotransferase 11 0 - 55 unit/L    Aspartate Aminotransferase 17 5 - 34 unit/L    Estimated GFR- >60 mls/min/1.73/m2   Troponin I #1    Collection Time: 08/05/22 12:10 PM   Result Value Ref Range    Troponin-I 0.019 0.000 -  0.045 ng/mL   B-Type natriuretic peptide (BNP)    Collection Time: 08/05/22 12:10 PM   Result Value Ref Range    Natriuretic Peptide 13.7 <=100.0 pg/mL   CBC with Differential    Collection Time: 08/05/22 12:10 PM   Result Value Ref Range    WBC 3.8 (L) 4.5 - 11.5 x10(3)/mcL    RBC 5.05 4.70 - 6.10 x10(6)/mcL    Hgb 14.2 14.0 - 18.0 gm/dL    Hct 46.9 42.0 - 52.0 %    MCV 92.9 80.0 - 94.0 fL    MCH 28.1 27.0 - 31.0 pg    MCHC 30.3 (L) 33.0 - 36.0 mg/dL    RDW 13.4 11.5 - 17.0 %    Platelet 199 130 - 400 x10(3)/mcL    MPV 10.1 7.4 - 10.4 fL    Neut % 63.0 %    Lymph % 27.6 %    Mono % 8.0 %    Eos % 1.1 %    Basophil % 0.3 %    Lymph # 1.04 0.6 - 4.6 x10(3)/mcL    Neut # 2.4 2.1 - 9.2 x10(3)/mcL    Mono # 0.30 0.1 - 1.3 x10(3)/mcL    Eos # 0.04 0 - 0.9 x10(3)/mcL    Baso # 0.01 0 - 0.2 x10(3)/mcL    IG# 0.00 0 - 0.04 x10(3)/mcL    IG% 0.0 %    NRBC% 0.0 %   Troponin I #2    Collection Time: 08/05/22  2:19 PM   Result Value Ref Range    Troponin-I 0.025 0.000 - 0.045 ng/mL   Troponin I    Collection Time: 08/06/22 12:44 AM   Result Value Ref Range    Troponin-I 0.043 0.000 - 0.045 ng/mL       Significant Imaging:  Imaging Results          X-Ray Chest PA And Lateral (Final result)  Result time 08/05/22 12:11:47    Final result by Iain Bueno MD (08/05/22 12:11:47)                 Impression:      No acute cardiopulmonary process identified.      Electronically signed by: Iain Bueno  Date:    08/05/2022  Time:    12:11             Narrative:    EXAMINATION:  XR CHEST PA AND LATERAL    CLINICAL HISTORY:  Chest Pain;    TECHNIQUE:  Two views    COMPARISON:  February 5, 2022.    FINDINGS:  Cardiopericardial silhouette is within normal limits.  No acute dense focal or segmental consolidation, congestion, pleural effusion or pneumothorax.                                EKG:      Results for orders placed or performed during the hospital encounter of 08/05/22   EKG 12-lead    Narrative    Test Reason : R07.9,    Vent.  Rate : 061 BPM     Atrial Rate : 061 BPM     P-R Int : 154 ms          QRS Dur : 098 ms      QT Int : 390 ms       P-R-T Axes : 081 069 052 degrees     QTc Int : 392 ms    Normal sinus rhythm  Minimal voltage criteria for LVH, may be normal variant ( Sokolow-Tyson )  Borderline Abnormal ECG  No previous ECGs available  Confirmed by Robb Reyes MD ([id number]) on 8/6/2022 12:08:24 AM    Referred By:             Confirmed By:Robb Reyes MD       Telemetry:      Physical Exam  Constitutional:       Appearance: Normal appearance.   HENT:      Head: Normocephalic and atraumatic.   Eyes:      Extraocular Movements: Extraocular movements intact.   Cardiovascular:      Rate and Rhythm: Normal rate and regular rhythm.   Pulmonary:      Effort: Pulmonary effort is normal.      Breath sounds: Normal breath sounds.   Musculoskeletal:         General: Normal range of motion.      Cervical back: Neck supple.   Skin:     General: Skin is warm and dry.   Neurological:      General: No focal deficit present.      Mental Status: He is alert.   Psychiatric:         Mood and Affect: Mood normal.         Behavior: Behavior normal.         Home Medications:   No current facility-administered medications on file prior to encounter.     Current Outpatient Medications on File Prior to Encounter   Medication Sig Dispense Refill    aspirin (ECOTRIN) 81 MG EC tablet Take 81 mg by mouth once daily.      atorvastatin (LIPITOR) 80 MG tablet Take 80 mg by mouth once daily.      hydrALAZINE (APRESOLINE) 25 MG tablet Take 25 mg by mouth 2 (two) times daily.      isosorbide dinitrate (ISORDIL) 20 MG tablet Take 20 mg by mouth 2 (two) times daily.      lisinopriL (PRINIVIL,ZESTRIL) 20 MG tablet Take 20 mg by mouth once daily.      metoprolol succinate (TOPROL-XL) 25 MG 24 hr tablet Take 12.5 mg by mouth once daily.      valsartan (DIOVAN) 320 MG tablet Take 320 mg by mouth once daily.      cloNIDine (CATAPRES) 0.1 MG tablet Take 1 tablet (0.1 mg  total) by mouth every 6 (six) hours as needed (For systolic blood pressure greater than 175). 30 tablet 0    furosemide (LASIX) 20 MG tablet Take 20 mg by mouth daily as needed.      nitroGLYCERIN (NITROSTAT) 0.4 MG SL tablet Place 0.4 mg under the tongue every 5 (five) minutes as needed for Chest pain.         Current Inpatient Medications:    Current Facility-Administered Medications:     acetaminophen tablet 650 mg, 650 mg, Oral, Q4H PRN, Michael Peacock MD, 650 mg at 08/05/22 2138    aspirin EC tablet 81 mg, 81 mg, Oral, Daily, Michael Peacock MD    atorvastatin tablet 80 mg, 80 mg, Oral, Daily, Michael Peacock MD    dextrose 10% bolus 125 mL, 12.5 g, Intravenous, PRN, Michael Peacock MD    dextrose 10% bolus 250 mL, 25 g, Intravenous, PRN, Michael Peacock MD    enoxaparin injection 40 mg, 40 mg, Subcutaneous, Daily, Michael Peacock MD, 40 mg at 08/05/22 2030    glucagon (human recombinant) injection 1 mg, 1 mg, Intramuscular, PRN, Michael Peacock MD    glucose chewable tablet 16 g, 16 g, Oral, PRN, Michael Peacock MD    glucose chewable tablet 24 g, 24 g, Oral, PRN, Michael Peacock MD    hydrALAZINE injection 10 mg, 10 mg, Intravenous, Q2H PRN, Michael Peacock MD    hydrALAZINE tablet 25 mg, 25 mg, Oral, BID, Michael Peacock MD    HYDROcodone-acetaminophen 5-325 mg per tablet 1 tablet, 1 tablet, Oral, Q6H PRN, Michael Peacock MD    isosorbide dinitrate tablet 20 mg, 20 mg, Oral, BID, Michael Peacock MD    lisinopriL tablet 20 mg, 20 mg, Oral, Daily, Michael Peacock MD    melatonin tablet 6 mg, 6 mg, Oral, Nightly PRN, Michael Peacock MD    metoprolol succinate (TOPROL-XL) 24 hr split tablet 12.5 mg, 12.5 mg, Oral, Daily, Michael ePacock MD    naloxone 0.4 mg/mL injection 0.02 mg, 0.02 mg, Intravenous, PRN, Michael Peacock MD    nitroGLYCERIN SL tablet 0.4 mg, 0.4 mg, Sublingual, Q5 Min PRN,  Michael Peacock MD    ondansetron injection 4 mg, 4 mg, Intravenous, Q6H PRN, Michael Peacock MD    simethicone chewable tablet 80 mg, 1 tablet, Oral, QID PRN, Michael Peacock MD    sodium chloride 0.9% flush 10 mL, 10 mL, Intravenous, Q12H PRN, Michael Peacock MD    sodium chloride 0.9% flush 10 mL, 10 mL, Intravenous, PRN, Michael Peacock MD    valsartan tablet 320 mg, 320 mg, Oral, Daily, Michael Peacock MD         VTE Risk Mitigation (From admission, onward)         Ordered     enoxaparin injection 40 mg  Daily         08/05/22 1847     IP VTE HIGH RISK PATIENT  Once         08/05/22 1847     Place sequential compression device  Until discontinued         08/05/22 1847                Assessment:   Chest Pain (resolved)       - Troponin x 3 negative - ACS ruled out (EKG ok)       - EF 56% per Echo Oct. 2021       - PET Stress Test May 2021: Probably abnormal, low risk, small fixed apical and apical inferior defect (no ischemia)       - [facility] Sept. 2018 - Normal coronary arteries, sluggish flow distally (? Microvascular issue, has ISDN)  HTN    Uncontrolled, recent increase in Hydralazine in clinic (to 50 TID)    Would change ISDN to 40 mg TID and Hydralazine to 50 mg TID    Consider CCB later on if needed  HLD  RRC s/p right partial nephrectomy  Cavernous Hemangioma of Liver  AAA Repair    Plan:   No plans for further inpatient ischemic workup - ACS ruled out  Continue home medications - Aspirin, Atorvastatin, Diovan, Metoprolol Succinate, and Lasix  Increase Hydralazine to 50 mg TID and ISDN to 40 mg TID  Continue Nitro SL prn CP  Patient to follow up with Dr. Cruz 1-2 weeks post discharge      Will sign off.  Thank you for your consult.     WILMAN Perez and Dalton Bhatt MD  Cardiology  [facility] - 4th Floor Medical Telemetry  08/06/2022 6:38 AM

## 2022-08-06 NOTE — DISCHARGE SUMMARY
[facility] Hospital Medicine Discharge Summary    Admit Date: 8/5/2022  Discharge Date and Time: 8/6/20222:30 PM  Admitting Physician: Yeimi service  Discharging Physician: Jeovany Arroyo MD.  Primary Care Physician: Chucky Logan II, MD  Consults: [facility]     Discharge Diagnoses:  Chest pain, No ACS   Uncontrolled hypertension  Coronary artery disease  AAA status post repair  RCC status post partial right nephrectomy  Valvular heart disease  Tobacco abuse  Hypertension  Hyperlipidemia       Hospital Course:   Patient is a 82-year-old male with history of CAD, AAA/repair, RCC status post right nephrectomy partial, VHD, HTN, HLD who presented to the ER complaining of chest pain that started yesterday.  He reports he would develop symptoms of dizziness and significant chest tightness associated with dyspnea.  It spontaneously resolved on its own yesterday but he woke up again this morning with similar symptoms so decided to come to the ER for further evaluation.  On arrival was afebrile hemodynamically stable laboratory work overall was unremarkable.  EKG showed no signs of ischemia chest x-ray was unremarkable.  Hospitalist service was consulted for admission for further evaluation  Patient was monitored overnight,. Had no new issues. BP meds was adjusted. Seen by [facility] and advised f/u in their clinic. Dc to home today.     Pt was seen and examined on the day of discharge  Vitals:  VITAL SIGNS: 24 HRS MIN & MAX LAST   Temp  Min: 98 °F (36.7 °C)  Max: 98.6 °F (37 °C) 98.1 °F (36.7 °C)   BP  Min: 136/66  Max: 195/119 (!) 161/87   Pulse  Min: 51  Max: 67  67   Resp  Min: 14  Max: 18 18   SpO2  Min: 96 %  Max: 99 % 98 %       Physical Exam:  Heart RRR  Lungs clear  Abdomen soft and non tender   No FND     Procedures Performed: No admission procedures for hospital encounter.     Significant Diagnostic Studies: See Full reports for all details    Recent Labs   Lab 08/05/22  1210  08/06/22  0719   WBC 3.8* 3.5*   RBC 5.05 5.00   HGB 14.2 14.1   HCT 46.9 46.0   MCV 92.9 92.0   MCH 28.1 28.2   MCHC 30.3* 30.7*   RDW 13.4 13.3    191   MPV 10.1 10.7*       Recent Labs   Lab 08/05/22  1210 08/06/22  0719    142   K 3.6 3.7   CO2 27 25   BUN 12.7 11.0   CREATININE 1.38* 1.16   CALCIUM 9.2 8.8   ALBUMIN 3.9 3.6   ALKPHOS 145 137   ALT 11 10   AST 17 15   BILITOT 0.7 0.7        Microbiology Results (last 7 days)     ** No results found for the last 168 hours. **           X-Ray Chest PA And Lateral  Narrative: EXAMINATION:  XR CHEST PA AND LATERAL    CLINICAL HISTORY:  Chest Pain;    TECHNIQUE:  Two views    COMPARISON:  February 5, 2022.    FINDINGS:  Cardiopericardial silhouette is within normal limits.  No acute dense focal or segmental consolidation, congestion, pleural effusion or pneumothorax.  Impression: No acute cardiopulmonary process identified.    Electronically signed by: Iain Bueno  Date:    08/05/2022  Time:    12:11         Medication List      CHANGE how you take these medications    hydrALAZINE 25 MG tablet  Commonly known as: APRESOLINE  Take 2 tablets (50 mg total) by mouth 3 (three) times daily.  What changed:   · how much to take  · when to take this     metoprolol succinate 25 MG 24 hr tablet  Commonly known as: TOPROL-XL  Take 1 tablet (25 mg total) by mouth once daily.  What changed: how much to take        CONTINUE taking these medications    aspirin 81 MG EC tablet  Commonly known as: ECOTRIN     atorvastatin 80 MG tablet  Commonly known as: LIPITOR     furosemide 20 MG tablet  Commonly known as: LASIX     isosorbide dinitrate 20 MG tablet  Commonly known as: ISORDIL     nitroGLYCERIN 0.4 MG SL tablet  Commonly known as: NITROSTAT     valsartan 320 MG tablet  Commonly known as: DIOVAN        STOP taking these medications    cloNIDine 0.1 MG tablet  Commonly known as: CATAPRES     lisinopriL 20 MG tablet  Commonly known as: PRINIVIL,ZESTRIL           Where to  Get Your Medications      These medications were sent to [facility] - [place], [place] - [address]  [address], [place] [address]    Phone: [phone]   · hydrALAZINE 25 MG tablet  · metoprolol succinate 25 MG 24 hr tablet          Explained in detail to the patient about the discharge plan, medications, and follow-up visits. Pt understands and agrees with the treatment plan  Discharge Disposition: Home or Self Care   Discharged Condition: stable  Diet-   Dietary Orders (From admission, onward)     Start     Ordered    08/06/22 0952  Diet heart healthy  Diet effective now         08/06/22 0951               Medications Per DC med rec  Activities as tolerated   Follow-up Information     Chucky Logan II, MD Follow up in 1 week(s).    Specialty: Family Medicine  Contact information:  [facility] [address] [phone]                       For further questions contact hospitalist office    Discharge time 33 minutes    For worsening symptoms, chest pain, shortness of breath, increased abdominal pain, high grade fever, stroke or stroke like symptoms, immediately go to the nearest Emergency Room or call 911 as soon as possible.      Jeovany Dominguez M.D, on 8/6/2022. at 2:30 PM.

## 2022-08-06 NOTE — PT/OT/SLP EVAL
Physical Therapy Evaluation and Discharge Note    Patient Name:  Cony Roman   MRN:  [id number]    Recommendations:     Discharge Recommendations:  home   Discharge Equipment Recommendations: none   Barriers to discharge: None    Assessment:     Cony Roman is a 82 y.o. male admitted with a medical diagnosis of Chest pain, rule out acute myocardial infarction. .  At this time, patient is functioning at their prior level of function and does not require further acute PT services.     Recent Surgery: * No surgery found *      Plan:     During this hospitalization, patient does not require further acute PT services.  Please re-consult if situation changes.      Subjective     Chief Complaint: None, reports chest pain is gone and his residual weakness is from being in the hospital bed.  Patient/Family Comments/goals: Return home and get his yard work done.  Pain/Comfort:  · Pain Rating 1: 0/10    Patients cultural, spiritual, Pentec[employer]alism conflicts given the current situation:      Living Environment:  Lives with wife in single level home.  Prior to admission, patients level of function was independent.  Equipment used at home: none.  DME owned (not currently used): none.  Upon discharge, patient will have assistance from his wife.    Objective:     Communicated with nurse prior to session.  Patient found HOB elevated with   upon PT entry to room.    General Precautions: Standard,     Orthopedic Precautions:    Braces:     Respiratory Status: Room air    Exams:  · Gross Motor Coordination:  WFL  · RLE ROM: WNL  · RLE Strength: WNL  · LLE ROM: WNL  · LLE Strength: WNL    Functional Mobility:  · Bed Mobility:     · Supine to Sit: stand by assistance  · Sit to Supine: stand by assistance  · Transfers:     · Sit to Stand:  stand by assistance with no AD  · Gait: Pt ambulates 400 ft without AD.  · Balance: Good    AM-PAC 6 CLICK MOBILITY  Total Score:        Therapeutic Activities and Exercises:       AM-PAC 6 CLICK  MOBILITY  Total Score:      Patient left HOB elevated with all lines intact and call button in reach.    GOALS:   Multidisciplinary Problems     Physical Therapy Goals     Not on file                History:     Past Medical History:   Diagnosis Date    HLD (hyperlipidemia)     Hypertension        Past Surgical History:   Procedure Laterality Date    ABDOMINAL SURGERY      NEPHRECTOMY Right        Time Tracking:     PT Received On: 08/06/22  PT Start Time: 1230     PT Stop Time: 1247  PT Total Time (min): 17 min     Billable Minutes: Evaluation 17      08/06/2022

## 2022-10-13 ENCOUNTER — HOSPITAL ENCOUNTER (EMERGENCY)
Facility: HOSPITAL | Age: 82
Discharge: HOME | End: 2022-10-13
Attending: EMERGENCY MEDICINE
Payer: MEDICARE

## 2022-10-13 VITALS
TEMPERATURE: 98 F | WEIGHT: 159 LBS | RESPIRATION RATE: 14 BRPM | HEART RATE: 50 BPM | DIASTOLIC BLOOD PRESSURE: 99 MMHG | OXYGEN SATURATION: 99 % | BODY MASS INDEX: 24.1 KG/M2 | HEIGHT: 68 IN | SYSTOLIC BLOOD PRESSURE: 164 MMHG

## 2022-10-13 DIAGNOSIS — R07.9 CHEST PAIN, UNSPECIFIED TYPE: Primary | ICD-10-CM

## 2022-10-13 DIAGNOSIS — R42 DIZZINESS: ICD-10-CM

## 2022-10-13 LAB
ALBUMIN SERPL-MCNC: 3.9 GM/DL (ref 3.4–4.8)
ALBUMIN/GLOB SERPL: 1.3 RATIO (ref 1.1–2)
ALP SERPL-CCNC: 131 UNIT/L (ref 40–150)
ALT SERPL-CCNC: 13 UNIT/L (ref 0–55)
AST SERPL-CCNC: 21 UNIT/L (ref 5–34)
BASOPHILS # BLD AUTO: 0.01 X10(3)/MCL (ref 0–0.2)
BASOPHILS NFR BLD AUTO: 0.2 %
BILIRUBIN DIRECT+TOT PNL SERPL-MCNC: 1.1 MG/DL
BNP BLD-MCNC: 20.5 PG/ML
BUN SERPL-MCNC: 13.5 MG/DL (ref 8.4–25.7)
CALCIUM SERPL-MCNC: 9.1 MG/DL (ref 8.8–10)
CHLORIDE SERPL-SCNC: 111 MMOL/L (ref 98–107)
CO2 SERPL-SCNC: 20 MMOL/L (ref 23–31)
CREAT SERPL-MCNC: 1.3 MG/DL (ref 0.73–1.18)
EOSINOPHIL # BLD AUTO: 0.13 X10(3)/MCL (ref 0–0.9)
EOSINOPHIL NFR BLD AUTO: 3.2 %
ERYTHROCYTE [DISTWIDTH] IN BLOOD BY AUTOMATED COUNT: 13.6 % (ref 11.5–17)
GFR SERPLBLD CREATININE-BSD FMLA CKD-EPI: 55 MLS/MIN/1.73/M2
GLOBULIN SER-MCNC: 2.9 GM/DL (ref 2.4–3.5)
GLUCOSE SERPL-MCNC: 76 MG/DL (ref 82–115)
HCT VFR BLD AUTO: 45.6 % (ref 42–52)
HGB BLD-MCNC: 14 GM/DL (ref 14–18)
IMM GRANULOCYTES # BLD AUTO: 0 X10(3)/MCL (ref 0–0.04)
IMM GRANULOCYTES NFR BLD AUTO: 0 %
LYMPHOCYTES # BLD AUTO: 1.63 X10(3)/MCL (ref 0.6–4.6)
LYMPHOCYTES NFR BLD AUTO: 40.4 %
MCH RBC QN AUTO: 28.2 PG (ref 27–31)
MCHC RBC AUTO-ENTMCNC: 30.7 MG/DL (ref 33–36)
MCV RBC AUTO: 91.9 FL (ref 80–94)
MONOCYTES # BLD AUTO: 0.34 X10(3)/MCL (ref 0.1–1.3)
MONOCYTES NFR BLD AUTO: 8.4 %
NEUTROPHILS # BLD AUTO: 1.9 X10(3)/MCL (ref 2.1–9.2)
NEUTROPHILS NFR BLD AUTO: 47.8 %
NRBC BLD AUTO-RTO: 0 %
PLATELET # BLD AUTO: 199 X10(3)/MCL (ref 130–400)
PMV BLD AUTO: 11.2 FL (ref 7.4–10.4)
POTASSIUM SERPL-SCNC: 4.5 MMOL/L (ref 3.5–5.1)
PROT SERPL-MCNC: 6.8 GM/DL (ref 5.8–7.6)
RBC # BLD AUTO: 4.96 X10(6)/MCL (ref 4.7–6.1)
SODIUM SERPL-SCNC: 144 MMOL/L (ref 136–145)
TROPONIN I SERPL-MCNC: 0.03 NG/ML (ref 0–0.04)
WBC # SPEC AUTO: 4 X10(3)/MCL (ref 4.5–11.5)

## 2022-10-13 PROCEDURE — 93010 EKG 12-LEAD: ICD-10-PCS | Mod: ,,, | Performed by: INTERNAL MEDICINE

## 2022-10-13 PROCEDURE — 80053 COMPREHEN METABOLIC PANEL: CPT | Performed by: PHYSICIAN ASSISTANT

## 2022-10-13 PROCEDURE — 84484 ASSAY OF TROPONIN QUANT: CPT

## 2022-10-13 PROCEDURE — 93010 ELECTROCARDIOGRAM REPORT: CPT | Mod: ,,, | Performed by: INTERNAL MEDICINE

## 2022-10-13 PROCEDURE — 36415 COLL VENOUS BLD VENIPUNCTURE: CPT | Performed by: PHYSICIAN ASSISTANT

## 2022-10-13 PROCEDURE — 93005 ELECTROCARDIOGRAM TRACING: CPT

## 2022-10-13 PROCEDURE — 85025 COMPLETE CBC W/AUTO DIFF WBC: CPT | Performed by: PHYSICIAN ASSISTANT

## 2022-10-13 PROCEDURE — 84484 ASSAY OF TROPONIN QUANT: CPT | Performed by: PHYSICIAN ASSISTANT

## 2022-10-13 PROCEDURE — 83880 ASSAY OF NATRIURETIC PEPTIDE: CPT | Performed by: PHYSICIAN ASSISTANT

## 2022-10-13 PROCEDURE — 99285 EMERGENCY DEPT VISIT HI MDM: CPT | Mod: 25

## 2022-10-13 RX ORDER — ASPIRIN 81 MG/1
81 TABLET ORAL DAILY
Qty: 30 TABLET | Refills: 11 | OUTPATIENT
Start: 2022-10-13 | End: 2022-10-13 | Stop reason: SDUPTHER

## 2022-10-13 RX ORDER — ASPIRIN 81 MG/1
81 TABLET ORAL DAILY
Qty: 30 TABLET | Refills: 11 | Status: ON HOLD | OUTPATIENT
Start: 2022-10-13 | End: 2022-11-11 | Stop reason: SDUPTHER

## 2022-10-13 NOTE — ED PROVIDER NOTES
Encounter Date: 10/13/2022       History     Chief Complaint   Patient presents with    Dizziness     Ambulated with steady gait to ED c/o dizziness and L arm pain since last night. Patient also reports SOB and chest tightness. Denies cardiac hx      Patient presents stating yesterday he had chest pain going to his left shoulder he states he has been weak for month but felt better patient states that the pain was not exertional it was dull and pressure he did not taking medicine no shortness of breath no fevers no chills no back or leg pain patient states he has had no symptoms today the pain was yesterday afternoon he has about 24 hours without symptoms.  No call or visit to Cardiology does not recall any cardiac workup in the past but has been told per him that his heart is normal.    The history is provided by the patient.   Dizziness  This is a new problem. The current episode started yesterday. The problem has been resolved. Associated symptoms include chest pain. Pertinent negatives include no shortness of breath. Nothing aggravates the symptoms. Nothing relieves the symptoms. He has tried nothing for the symptoms.   Review of patient's allergies indicates:  No Known Allergies  Past Medical History:   Diagnosis Date    HLD (hyperlipidemia)     Hypertension      Past Surgical History:   Procedure Laterality Date    ABDOMINAL SURGERY      NEPHRECTOMY Right      History reviewed. No pertinent family history.  Social History     Tobacco Use    Smoking status: Never    Smokeless tobacco: Never   Substance Use Topics    Alcohol use: Never    Drug use: Never     Review of Systems   Constitutional:  Negative for fever.   HENT:  Negative for sore throat.    Respiratory:  Negative for shortness of breath.    Cardiovascular:  Positive for chest pain.   Gastrointestinal:  Negative for nausea.   Genitourinary:  Negative for dysuria.   Musculoskeletal:  Negative for back pain.   Skin:  Negative for rash.   Neurological:   Positive for dizziness. Negative for weakness.   Hematological:  Does not bruise/bleed easily.     Physical Exam     Initial Vitals [10/13/22 0901]   BP Pulse Resp Temp SpO2   (!) 140/83 71 19 98.4 °F (36.9 °C) 97 %      MAP       --         Physical Exam    Nursing note and vitals reviewed.  Constitutional: He appears well-developed and well-nourished.   HENT:   Head: Normocephalic and atraumatic.   Eyes: EOM are normal. Pupils are equal, round, and reactive to light.   Neck:   Normal range of motion.  Cardiovascular:  Normal rate, regular rhythm, normal heart sounds and intact distal pulses.           Pulmonary/Chest: Breath sounds normal.   Abdominal: Abdomen is soft. Bowel sounds are normal.   Musculoskeletal:         General: Normal range of motion.      Cervical back: Normal range of motion.     Neurological: He is alert and oriented to person, place, and time. He has normal strength. GCS score is 15. GCS eye subscore is 4. GCS verbal subscore is 5. GCS motor subscore is 6.   Skin: Skin is warm and dry. Capillary refill takes less than 2 seconds.   Psychiatric: He has a normal mood and affect. Judgment normal.       ED Course   Procedures  Labs Reviewed   COMPREHENSIVE METABOLIC PANEL - Abnormal; Notable for the following components:       Result Value    Chloride 111 (*)     Carbon Dioxide 20 (*)     Glucose Level 76 (*)     Creatinine 1.30 (*)     All other components within normal limits   CBC WITH DIFFERENTIAL - Abnormal; Notable for the following components:    WBC 4.0 (*)     MCHC 30.7 (*)     MPV 11.2 (*)     Neut # 1.9 (*)     All other components within normal limits   B-TYPE NATRIURETIC PEPTIDE - Normal   CBC W/ AUTO DIFFERENTIAL    Narrative:     The following orders were created for panel order CBC auto differential.  Procedure                               Abnormality         Status                     ---------                               -----------         ------                     CBC with  Differential[[id number]]        Abnormal            Final result                 Please view results for these tests on the individual orders.   TROPONIN I   URINALYSIS, REFLEX TO URINE CULTURE   POCT TROPONIN     EKG Readings: (Independently Interpreted)   Initial Reading: No STEMI. Rhythm: Normal Sinus Rhythm. Ectopy: No Ectopy. Conduction: Normal. ST Segments: Normal ST Segments. T Waves: Normal. Clinical Impression: Normal Sinus Rhythm   0901   ECG Results              EKG 12-lead (Final result)  Result time 10/13/22 10:51:21      Final result by Interface, Lab In [facility] (10/13/22 10:51:21)                   Narrative:    Test Reason : R42,    Vent. Rate : 064 BPM     Atrial Rate : 064 BPM     P-R Int : 148 ms          QRS Dur : 096 ms      QT Int : 400 ms       P-R-T Axes : 073 070 049 degrees     QTc Int : 412 ms    Normal sinus rhythm  Normal ECG  When compared with ECG of 05-AUG-2022 11:34,  No significant change was found  Confirmed by Josh Alarcon MD ([id number]) on 10/13/2022 10:51:13 AM    Referred By: [email or web address]   SELF           Confirmed By:Josh Alarcon MD                                  Imaging Results              CT Head Without Contrast (Final result)  Result time 10/13/22 10:11:16      Final result by Lukasz Ahuja MD (10/13/22 10:11:16)                   Impression:      Chronic age-related changes.  No acute process      Electronically signed by: Lukasz Ahuja  Date:    10/13/2022  Time:    10:11               Narrative:    EXAMINATION:  CT HEAD WITHOUT CONTRAST    CLINICAL HISTORY:  Dizziness, persistent/recurrent, cardiac or vascular cause suspected;    TECHNIQUE:  Multiple axial images were obtained from the base of the brain to the vertex without contrast administration.  Sagittal and coronal reconstructions were performed..Automatic exposure control is utilized to reduce patient radiation exposure.    COMPARISON:  01/28/2022    FINDINGS:  There is no intracranial mass or lesion  seen.  No hemorrhage is seen.  No acute infarct is seen.  There is some cerebral atrophy seen.  There is some compensatory ventricular dilatation and periventricular white matter changes consistent with the patient's age.  Calvarium is intact.  The posterior fossa is intact.  The visualized portions of the paranasal sinuses show no acute abnormality.                                       Medications - No data to display  Medical Decision Making:   History:   Old Records Summarized: records from previous admission(s).       <> Summary of Records: Patient with reports of left heart catheterization 2018,   Differential Diagnosis:   Without chest pain today  Clinical Tests:   Lab Tests: Ordered and Reviewed  The following lab test(s) were unremarkable: CBC  ED Management:  No chest pain in 24 hours discussed case with cardiology service [place] nurse practitioner they will follow up patient with a negative PET in May admission 2 months ago with rule out no chest pain today EKG cardiac enzymes normal will follow-up no adjustments to medications return emergency room as needed                        Clinical Impression:   Final diagnoses:  [R42] Dizziness  [R07.9] Chest pain, unspecified type (Primary)        ED Disposition Condition    Discharge Stable          ED Prescriptions       Medication Sig Dispense Start Date End Date Auth. Provider    aspirin (ECOTRIN) 81 MG EC tablet  (Status: Discontinued) Take 1 tablet (81 mg total) by mouth once daily. 30 tablet 10/13/2022 10/13/2022 Douglas Tate III, MD    aspirin (ECOTRIN) 81 MG EC tablet Take 1 tablet (81 mg total) by mouth once daily. 30 tablet 10/13/2022 10/13/2023 Douglas Tate III, MD          Follow-up Information       Follow up With Specialties Details Why Contact Info    Mynor Cruz MD Cardiovascular Disease, Cardiology Call in 1 day  [address] [facility]  [facility] [id number] [phone]                Douglas Tate III, MD  10/13/22 0597

## 2022-10-14 LAB
POC CARDIAC TROPONIN I: 0.02 NG/ML
SAMPLE: NORMAL

## 2022-11-11 ENCOUNTER — HOSPITAL ENCOUNTER (OUTPATIENT)
Facility: HOSPITAL | Age: 82
Discharge: HOME | End: 2022-11-11
Attending: INTERNAL MEDICINE
Payer: MEDICARE

## 2022-11-11 DIAGNOSIS — R07.9 CHEST PAIN, UNSPECIFIED: ICD-10-CM

## 2022-11-11 DIAGNOSIS — I25.10 CAD (CORONARY ARTERY DISEASE): ICD-10-CM

## 2022-11-11 DIAGNOSIS — R07.9 CHEST PAIN, RULE OUT ACUTE MYOCARDIAL INFARCTION: Primary | ICD-10-CM

## 2022-11-11 PROCEDURE — 27201423 OPTIME MED/SURG SUP & DEVICES STERILE SUPPLY: Performed by: INTERNAL MEDICINE

## 2022-11-11 PROCEDURE — C1887 CATHETER, GUIDING: HCPCS | Performed by: INTERNAL MEDICINE

## 2022-11-11 PROCEDURE — 25500020 PHARM REV CODE 255: Performed by: INTERNAL MEDICINE

## 2022-11-11 PROCEDURE — 63600175 PHARM REV CODE 636 W HCPCS: Performed by: INTERNAL MEDICINE

## 2022-11-11 PROCEDURE — 25000003 PHARM REV CODE 250: Performed by: INTERNAL MEDICINE

## 2022-11-11 PROCEDURE — 99152 MOD SED SAME PHYS/QHP 5/>YRS: CPT | Performed by: INTERNAL MEDICINE

## 2022-11-11 PROCEDURE — 93010 ELECTROCARDIOGRAM REPORT: CPT | Mod: ,,, | Performed by: INTERNAL MEDICINE

## 2022-11-11 PROCEDURE — 93458 L HRT ARTERY/VENTRICLE ANGIO: CPT | Performed by: INTERNAL MEDICINE

## 2022-11-11 PROCEDURE — 93005 ELECTROCARDIOGRAM TRACING: CPT | Mod: 59

## 2022-11-11 PROCEDURE — C1769 GUIDE WIRE: HCPCS | Performed by: INTERNAL MEDICINE

## 2022-11-11 PROCEDURE — C1894 INTRO/SHEATH, NON-LASER: HCPCS | Performed by: INTERNAL MEDICINE

## 2022-11-11 PROCEDURE — 93010 EKG 12-LEAD: ICD-10-PCS | Mod: ,,, | Performed by: INTERNAL MEDICINE

## 2022-11-11 RX ORDER — ACETAMINOPHEN 325 MG/1
650 TABLET ORAL EVERY 4 HOURS PRN
Status: DISCONTINUED | OUTPATIENT
Start: 2022-11-11 | End: 2022-11-13 | Stop reason: HOSPADM

## 2022-11-11 RX ORDER — NITROGLYCERIN 20 MG/100ML
INJECTION INTRAVENOUS
Status: DISCONTINUED | OUTPATIENT
Start: 2022-11-11 | End: 2022-11-13 | Stop reason: HOSPADM

## 2022-11-11 RX ORDER — SODIUM CHLORIDE 9 MG/ML
INJECTION, SOLUTION INTRAVENOUS ONCE
Status: ACTIVE | OUTPATIENT
Start: 2022-11-11

## 2022-11-11 RX ORDER — DIPHENHYDRAMINE HCL 25 MG
50 CAPSULE ORAL
Status: DISPENSED | OUTPATIENT
Start: 2022-11-11

## 2022-11-11 RX ORDER — HEPARIN SODIUM 1000 [USP'U]/ML
INJECTION, SOLUTION INTRAVENOUS; SUBCUTANEOUS
Status: DISCONTINUED | OUTPATIENT
Start: 2022-11-11 | End: 2022-11-13 | Stop reason: HOSPADM

## 2022-11-11 RX ORDER — MIDAZOLAM HYDROCHLORIDE 1 MG/ML
INJECTION INTRAMUSCULAR; INTRAVENOUS
Status: DISCONTINUED | OUTPATIENT
Start: 2022-11-11 | End: 2022-11-13 | Stop reason: HOSPADM

## 2022-11-11 RX ORDER — LIDOCAINE HYDROCHLORIDE 20 MG/ML
INJECTION, SOLUTION EPIDURAL; INFILTRATION; INTRACAUDAL; PERINEURAL
Status: DISCONTINUED | OUTPATIENT
Start: 2022-11-11 | End: 2022-11-13 | Stop reason: HOSPADM

## 2022-11-11 RX ORDER — DIAZEPAM 5 MG/1
10 TABLET ORAL
Status: DISPENSED | OUTPATIENT
Start: 2022-11-11

## 2022-11-11 RX ORDER — SODIUM CHLORIDE 0.9 % (FLUSH) 0.9 %
10 SYRINGE (ML) INJECTION
Status: ACTIVE | OUTPATIENT
Start: 2022-11-11

## 2022-11-11 RX ORDER — ONDANSETRON 4 MG/1
8 TABLET, ORALLY DISINTEGRATING ORAL EVERY 8 HOURS PRN
Status: DISCONTINUED | OUTPATIENT
Start: 2022-11-11 | End: 2022-11-13 | Stop reason: HOSPADM

## 2022-11-11 RX ORDER — VERAPAMIL HYDROCHLORIDE 2.5 MG/ML
INJECTION, SOLUTION INTRAVENOUS
Status: DISCONTINUED | OUTPATIENT
Start: 2022-11-11 | End: 2022-11-13 | Stop reason: HOSPADM

## 2022-11-11 RX ORDER — FENTANYL CITRATE 50 UG/ML
INJECTION, SOLUTION INTRAMUSCULAR; INTRAVENOUS
Status: DISCONTINUED | OUTPATIENT
Start: 2022-11-11 | End: 2022-11-13 | Stop reason: HOSPADM

## 2022-11-11 RX ADMIN — DIPHENHYDRAMINE HYDROCHLORIDE 50 MG: 25 CAPSULE ORAL at 09:11

## 2022-11-11 RX ADMIN — DIAZEPAM 10 MG: 5 TABLET ORAL at 09:11

## 2022-11-11 NOTE — DISCHARGE SUMMARY
[facility] - Cath Lab Services  Discharge Note  Short Stay    Procedure(s) (LRB):  CATHETERIZATION, HEART, LEFT (Left)      OUTCOME: Patient tolerated treatment/procedure well without complication and is now ready for discharge.    DISPOSITION: Home or Self Care    FINAL DIAGNOSIS:  <principal problem not specified>    FOLLOWUP: In clinic    DISCHARGE INSTRUCTIONS:    Discharge Procedure Orders   Diet Cardiac     Call MD for:  temperature >100.4     Call MD for:  persistent nausea and vomiting     Call MD for:  severe uncontrolled pain     Call MD for:  difficulty breathing, headache or visual disturbances     Call MD for:  redness, tenderness, or signs of infection (pain, swelling, redness, odor or green/yellow discharge around incision site)     Call MD for:  hives     Remove dressing in 24 hours        TIME SPENT ON DISCHARGE: 31 minutes

## 2022-11-11 NOTE — INTERVAL H&P NOTE
Patient name: Cony Roman  MRN: [id number]  : 1940  Cath Lab Procedure H&P Update    Pre-Procedure Assessment:    I saw and examined the patient face to face. The patient has been re-evaluated and his condition is unchanged. The reason for admission, procedure and care is still present.  Based on the patients H&P, pre-procedure physical exam, relevant diagnostic studies, NPO status and information obtained from the patient, I determine the patient is an appropriate candidate for the proposed procedure and anesthesia planned. I further certify the anesthesia risks, benefits and options have been explained to the patient to which he agrees as documented on the procedural consent.

## 2022-11-13 VITALS
HEART RATE: 50 BPM | WEIGHT: 163.38 LBS | TEMPERATURE: 99 F | HEIGHT: 66 IN | SYSTOLIC BLOOD PRESSURE: 223 MMHG | OXYGEN SATURATION: 96 % | DIASTOLIC BLOOD PRESSURE: 105 MMHG | BODY MASS INDEX: 26.26 KG/M2

## 2023-01-31 ENCOUNTER — HOSPITAL ENCOUNTER (EMERGENCY)
Facility: HOSPITAL | Age: 83
Discharge: HOME | End: 2023-01-31
Attending: STUDENT IN AN ORGANIZED HEALTH CARE EDUCATION/TRAINING PROGRAM
Payer: MEDICARE

## 2023-01-31 VITALS
DIASTOLIC BLOOD PRESSURE: 96 MMHG | HEIGHT: 68 IN | OXYGEN SATURATION: 98 % | SYSTOLIC BLOOD PRESSURE: 153 MMHG | WEIGHT: 162 LBS | TEMPERATURE: 98 F | BODY MASS INDEX: 24.55 KG/M2 | RESPIRATION RATE: 16 BRPM | HEART RATE: 52 BPM

## 2023-01-31 DIAGNOSIS — R10.30 LOWER ABDOMINAL PAIN: Primary | ICD-10-CM

## 2023-01-31 DIAGNOSIS — N50.89 TESTICULAR SWELLING: ICD-10-CM

## 2023-01-31 DIAGNOSIS — N50.812 PAIN IN BOTH TESTICLES: ICD-10-CM

## 2023-01-31 DIAGNOSIS — R07.9 CHEST PAIN, RULE OUT ACUTE MYOCARDIAL INFARCTION: ICD-10-CM

## 2023-01-31 DIAGNOSIS — N50.811 PAIN IN BOTH TESTICLES: ICD-10-CM

## 2023-01-31 LAB
ALBUMIN SERPL-MCNC: 4.1 G/DL (ref 3.4–4.8)
ALBUMIN/GLOB SERPL: 1.5 RATIO (ref 1.1–2)
ALP SERPL-CCNC: 132 UNIT/L (ref 40–150)
ALT SERPL-CCNC: 10 UNIT/L (ref 0–55)
APPEARANCE UR: CLEAR
AST SERPL-CCNC: 18 UNIT/L (ref 5–34)
BACTERIA #/AREA URNS AUTO: NORMAL /HPF
BASOPHILS # BLD AUTO: 0.01 X10(3)/MCL (ref 0–0.2)
BASOPHILS NFR BLD AUTO: 0.3 %
BILIRUB UR QL STRIP.AUTO: NEGATIVE MG/DL
BILIRUBIN DIRECT+TOT PNL SERPL-MCNC: 1.2 MG/DL
BUN SERPL-MCNC: 15 MG/DL (ref 8.4–25.7)
CALCIUM SERPL-MCNC: 9.4 MG/DL (ref 8.8–10)
CHLORIDE SERPL-SCNC: 110 MMOL/L (ref 98–107)
CO2 SERPL-SCNC: 24 MMOL/L (ref 23–31)
COLOR UR AUTO: YELLOW
CREAT SERPL-MCNC: 1.29 MG/DL (ref 0.73–1.18)
EOSINOPHIL # BLD AUTO: 0.15 X10(3)/MCL (ref 0–0.9)
EOSINOPHIL NFR BLD AUTO: 3.9 %
ERYTHROCYTE [DISTWIDTH] IN BLOOD BY AUTOMATED COUNT: 13.5 % (ref 11.5–17)
GFR SERPLBLD CREATININE-BSD FMLA CKD-EPI: 55 MLS/MIN/1.73/M2
GLOBULIN SER-MCNC: 2.8 GM/DL (ref 2.4–3.5)
GLUCOSE SERPL-MCNC: 82 MG/DL (ref 82–115)
GLUCOSE UR QL STRIP.AUTO: NEGATIVE MG/DL
HCT VFR BLD AUTO: 46.4 % (ref 42–52)
HGB BLD-MCNC: 14.3 GM/DL (ref 14–18)
IMM GRANULOCYTES # BLD AUTO: 0 X10(3)/MCL (ref 0–0.04)
IMM GRANULOCYTES NFR BLD AUTO: 0 %
KETONES UR QL STRIP.AUTO: NEGATIVE MG/DL
LEUKOCYTE ESTERASE UR QL STRIP.AUTO: NEGATIVE UNIT/L
LIPASE SERPL-CCNC: 17 U/L
LYMPHOCYTES # BLD AUTO: 1.62 X10(3)/MCL (ref 0.6–4.6)
LYMPHOCYTES NFR BLD AUTO: 42.4 %
MCH RBC QN AUTO: 27.7 PG
MCHC RBC AUTO-ENTMCNC: 30.8 MG/DL (ref 33–36)
MCV RBC AUTO: 89.9 FL (ref 80–94)
MONOCYTES # BLD AUTO: 0.37 X10(3)/MCL (ref 0.1–1.3)
MONOCYTES NFR BLD AUTO: 9.7 %
NEUTROPHILS # BLD AUTO: 1.67 X10(3)/MCL (ref 2.1–9.2)
NEUTROPHILS NFR BLD AUTO: 43.7 %
NITRITE UR QL STRIP.AUTO: NEGATIVE
NRBC BLD AUTO-RTO: 0 %
PH UR STRIP.AUTO: 6 [PH]
PLATELET # BLD AUTO: 206 X10(3)/MCL (ref 130–400)
PMV BLD AUTO: 10.8 FL (ref 7.4–10.4)
POTASSIUM SERPL-SCNC: 4.2 MMOL/L (ref 3.5–5.1)
PROT SERPL-MCNC: 6.9 GM/DL (ref 5.8–7.6)
PROT UR QL STRIP.AUTO: NEGATIVE MG/DL
RBC # BLD AUTO: 5.16 X10(6)/MCL (ref 4.7–6.1)
RBC #/AREA URNS AUTO: <5 /HPF
RBC UR QL AUTO: NEGATIVE UNIT/L
SODIUM SERPL-SCNC: 144 MMOL/L (ref 136–145)
SP GR UR STRIP.AUTO: 1.02 (ref 1–1.03)
SQUAMOUS #/AREA URNS AUTO: <5 /HPF
UROBILINOGEN UR STRIP-ACNC: 1 MG/DL
WBC # SPEC AUTO: 3.8 X10(3)/MCL (ref 4.5–11.5)
WBC #/AREA URNS AUTO: <5 /HPF

## 2023-01-31 PROCEDURE — 99285 EMERGENCY DEPT VISIT HI MDM: CPT | Mod: 25

## 2023-01-31 PROCEDURE — 25500020 PHARM REV CODE 255: Performed by: STUDENT IN AN ORGANIZED HEALTH CARE EDUCATION/TRAINING PROGRAM

## 2023-01-31 PROCEDURE — 83690 ASSAY OF LIPASE: CPT | Performed by: PHYSICIAN ASSISTANT

## 2023-01-31 PROCEDURE — 81001 URINALYSIS AUTO W/SCOPE: CPT | Performed by: PHYSICIAN ASSISTANT

## 2023-01-31 PROCEDURE — 85025 COMPLETE CBC W/AUTO DIFF WBC: CPT | Performed by: PHYSICIAN ASSISTANT

## 2023-01-31 PROCEDURE — 63600175 PHARM REV CODE 636 W HCPCS: Performed by: STUDENT IN AN ORGANIZED HEALTH CARE EDUCATION/TRAINING PROGRAM

## 2023-01-31 PROCEDURE — 80053 COMPREHEN METABOLIC PANEL: CPT | Performed by: PHYSICIAN ASSISTANT

## 2023-01-31 RX ORDER — METHOCARBAMOL 500 MG/1
500 TABLET, FILM COATED ORAL 3 TIMES DAILY
Qty: 20 TABLET | Refills: 0 | Status: SHIPPED | OUTPATIENT
Start: 2023-01-31 | End: 2023-02-07

## 2023-01-31 RX ADMIN — IOPAMIDOL 100 ML: 755 INJECTION, SOLUTION INTRAVENOUS at 12:01

## 2023-01-31 RX ADMIN — SODIUM CHLORIDE, POTASSIUM CHLORIDE, SODIUM LACTATE AND CALCIUM CHLORIDE 1000 ML: 600; 310; 30; 20 INJECTION, SOLUTION INTRAVENOUS at 11:01

## 2023-01-31 NOTE — FIRST PROVIDER EVALUATION
"Medical screening examination initiated.  I have conducted a focused provider triage encounter, findings are as follows:    Brief history of present illness:  82 Male presents to ED for evaluation of lower abdominal pain with intermittent testicular pain and swelling over the 2 days.  Denies any nausea or vomiting.    Vitals:    01/31/23 0902   BP: (!) 173/97   Pulse: 66   Resp: 16   Temp: 97.7 °F (36.5 °C)   TempSrc: Oral   SpO2: 97%   Weight: 73.5 kg (162 lb)   Height: 5' 8" (1.727 m)       Pertinent physical exam:  Patient is awake and alert and oriented.  Ambulatory to triage.  In no acute distress.      Brief workup plan:   Labs, UA, ultrasound    Preliminary workup initiated; this workup will be continued and followed by the physician or advanced practice provider that is assigned to the patient when roomed.  "

## 2023-01-31 NOTE — ED PROVIDER NOTES
"Encounter Date: 1/31/2023    SCRIBE #1 NOTE: I, Cyndy Sosa, am scribing for, and in the presence of,  Isidro Araujo MD. I have scribed the following portions of the note - Other sections scribed: HPI, ROS, PE.     History     Chief Complaint   Patient presents with    Abdominal Pain     Pt reports pov c/o lower abdominal pain and his testicles "going up and coming down" x2 days. Denies n/v/d, injury.      82 year old male w/ hx of HTN, HLD, CAD, AAA w/ repair, and kidney CA w/ nephrectomy presents to ED for lower abdominal pain and testicular pain. Pt states since yesterday he's had intermittent lower abdominal pain with associated R/L testicular discomfort like they are "going up and down." He is on blood thinners and did not take this medications today. He denies any trauma, pain during urination, diarrhea, constipation, fever, or chills.     Chart review reveals history of CAD, AAA status post repair, partial right nephrectomy from a renal cell carcinoma.  I do not see any past medical history or past visits for abdominal pain or any other urogenital complaints.    The history is provided by the patient. No  was used.   Abdominal Pain  The current episode started yesterday. The problem has not changed since onset.The abdominal pain is located in the suprapubic region. The other symptoms of the illness do not include fever, fatigue, shortness of breath, nausea, vomiting, diarrhea or dysuria.   Symptoms associated with the illness do not include chills, constipation or hematuria.   Review of patient's allergies indicates:  No Known Allergies  Past Medical History:   Diagnosis Date    HLD (hyperlipidemia)     Hypertension      Past Surgical History:   Procedure Laterality Date    ABDOMINAL SURGERY      LEFT HEART CATHETERIZATION Left 11/11/2022    Procedure: CATHETERIZATION, HEART, LEFT;  Surgeon: Colby Cook MD;  Location: [facility] CATH LAB;  Service: Cardiology;  Laterality: Left;  [facility] " +/- PCI    NEPHRECTOMY Right      No family history on file.  Social History     Tobacco Use    Smoking status: Never    Smokeless tobacco: Never   Substance Use Topics    Alcohol use: Never    Drug use: Never     Review of Systems   Constitutional:  Negative for chills, fatigue and fever.   Respiratory:  Negative for cough, chest tightness and shortness of breath.    Cardiovascular:  Negative for chest pain and leg swelling.   Gastrointestinal:  Positive for abdominal pain. Negative for blood in stool, constipation, diarrhea, nausea and vomiting.   Genitourinary:  Positive for testicular pain. Negative for dysuria, genital sores, hematuria, penile pain, penile swelling and scrotal swelling.   Musculoskeletal:  Negative for arthralgias, joint swelling, myalgias and neck pain.   Skin:  Negative for pallor and wound.   Neurological:  Negative for weakness.     Physical Exam     Initial Vitals [01/31/23 0902]   BP Pulse Resp Temp SpO2   (!) 173/97 66 16 97.7 °F (36.5 °C) 97 %      MAP       --         Physical Exam    Nursing note and vitals reviewed.  Constitutional: He appears well-developed and well-nourished. He is not diaphoretic. He does not appear ill. No distress.   HENT:   Head: Normocephalic and atraumatic.   Right Ear: External ear normal.   Left Ear: External ear normal.   Nose: Nose normal.   Mouth/Throat: Oropharynx is clear and moist.   Eyes: Conjunctivae and EOM are normal. Pupils are equal, round, and reactive to light. Right eye exhibits no discharge. Left eye exhibits no discharge.   Neck: Neck supple. No tracheal deviation present.   Normal range of motion.  Cardiovascular:  Normal rate, regular rhythm, normal heart sounds and intact distal pulses.     Exam reveals no gallop and no friction rub.       No murmur heard.  Pulses:       Dorsalis pedis pulses are 2+ on the right side and 2+ on the left side.   Pulmonary/Chest: Breath sounds normal. No stridor. No respiratory distress. He has no wheezes.  He has no rhonchi. He has no rales. He exhibits no tenderness.   Abdominal: Abdomen is soft. Bowel sounds are normal. He exhibits no distension and no mass. There is abdominal tenderness (mild) in the epigastric area. There is no guarding.   Genitourinary:    Testes normal.   Right testis shows no swelling and no tenderness. Left testis shows no swelling and no tenderness.    Genitourinary Comments: Testes exam is normal, exam chaperones by nurse Ti.      Musculoskeletal:         General: No tenderness or edema. Normal range of motion.      Cervical back: Normal range of motion and neck supple.     Neurological: He is alert and oriented to person, place, and time. No cranial nerve deficit or sensory deficit.   Skin: Skin is warm and dry. Capillary refill takes less than 2 seconds. No rash noted. No erythema. No pallor.   Multiple well healed abdominal surgical scars.       ED Course   Procedures  Labs Reviewed   COMPREHENSIVE METABOLIC PANEL - Abnormal; Notable for the following components:       Result Value    Chloride 110 (*)     Creatinine 1.29 (*)     All other components within normal limits   CBC WITH DIFFERENTIAL - Abnormal; Notable for the following components:    WBC 3.8 (*)     MCHC 30.8 (*)     MPV 10.8 (*)     Neut # 1.67 (*)     All other components within normal limits   URINALYSIS, REFLEX TO URINE CULTURE - Normal   LIPASE - Normal   URINALYSIS, MICROSCOPIC - Normal   CBC W/ AUTO DIFFERENTIAL    Narrative:     The following orders were created for panel order CBC auto differential.  Procedure                               Abnormality         Status                     ---------                               -----------         ------                     CBC with Differential[[id number]]        Abnormal            Final result                 Please view results for these tests on the individual orders.          Imaging Results              CT Abdomen Pelvis With Contrast (Final result)  Result time  01/31/23 12:26:56      Final result by Jose Carlos Mckenna MD (01/31/23 12:26:56)                   Impression:      No acute abdominopelvic findings.  Chronic findings above.      Electronically signed by: Jose Carlos Mckenna  Date:    01/31/2023  Time:    12:26               Narrative:    EXAMINATION:  CT ABDOMEN PELVIS WITH CONTRAST    CLINICAL HISTORY:  Abdominal pain, acute, nonlocalized;Lower abd pain;    TECHNIQUE:  Helical acquisition through the abdomen and pelvis with IV contrast.  Three plane reconstructions were provided for review.  mGycm. Automatic exposure control, adjustment of mA/kV or iterative reconstruction technique was used to reduce radiation.    COMPARISON:  28 October 2021    FINDINGS:  There is mild atelectasis or scarring at the lung bases.    Similar appearance of scattered liver lesions most compatible with cysts and hemangiomas.  Suspect gallstones.  No pericholecystic inflammation.  There is no significant abnormality of the spleen or adrenals.  Stable mild dilatation of the main pancreatic duct throughout.  There is no hydronephrosis or suspicious renal lesion    There is no bowel obstruction.  No significant inflammatory changes of the bowel.  There is moderate stool in the colon.  There is no free air.    There is bladder wall thickening which may relate to chronic outlet obstruction given the enlarged prostate.  This is similar to prior.  No pelvic free fluid.  Stable appearance of the abdominal aorta which is likely status post surgical repair.  Moderate atherosclerotic disease.  No retroperitoneal adenopathy.    Moderate degenerative change of the spine.  There are changes of right inguinal hernia repair                                       US Scrotum And Testicles (Final result)  Result time 01/31/23 12:25:51      Final result by Manny Carrillo MD (01/31/23 12:25:51)                   Narrative:    EXAMINATION  US SCROTUM AND TESTICLES    CLINICAL HISTORY  Other specified  disorders of the male genital organs; scrotal swelling    TECHNIQUE  Grayscale and Doppler interrogation of the scrotum and testes.    COMPARISON  20 March 2017    FINDINGS  Exam quality: adequate for evaluation    Right Testicle: Surface contour intact and smooth. No evidence of focal mass or infiltrative parenchymal abnormality.  No significant enlargement or heterogeneity of the epididymis.    Left Testicle: Surface contour intact and smooth. No definite evidence of new or enlarged expansile mass-like lesion is appreciated.  As before, there is a lower pole multi-cystic structure measuring approximately 1.1 cm x 1.1 cm x 0.9 cm and is of grossly unchanged size and sonographic appearance.  No significant enlargement or heterogeneity of the epididymis.    Doppler Interrogation: Spontaneous arterial flow is demonstrated within each testicle, with symmetric velocity and normal low-resistance spectral waveform.  Bilateral venous outflow is maintained.    Other findings: No significant hydrocele or other abnormal fluid appreciated.  No varicocele appreciated bilaterally.      Measurements:    *Right testicle: 2.7 cm x 1.5 cm x 1.9 cm (4 cc)  *Left testicle: 2.8 cm x 2.7 cm x 1.8 cm (7 cc)    IMPRESSION  1. No evidence of active testicular torsion or other acute scrotal abnormality.  2. Grossly stable size and appearance of multi-cystic left testicular lesion dating to March 2017.  Overall appearance is nonspecific, but favors benign etiology given lack of interval change.      Electronically signed by: Manny Carrillo  Date:    01/31/2023  Time:    12:25                                     Medications   lactated ringers bolus 1,000 mL (1,000 mLs Intravenous New Bag 1/31/23 1100)   iopamidoL (ISOVUE-370) injection 100 mL (100 mLs Intravenous Given 1/31/23 1217)         Medical Decision Making  Patient presents with mild lower abdominal pain and occasional testicular pain    Differential diagnosis include, but are not  limited to:  Testicular torsion, orchitis, varicocele, diverticulitis, UTI, SBO    Patient is awake alert well-appearing.  Denies any complaints on re-evaluation.  His pain is intermittent in nature.  Very nonspecific.  CT scan shows AAA but no changes, no bowel issues noted by my read or Radiology read.  Fairly benign CT scan of abdomen.  Ultrasound of his testicles do not show any decreased flow there stable cystic changes.  His vitals are stable.  His labs are unrevealing.  He has no leukocytosis, he is not anemic.  No left shift.  Electrolytes are within normal limits other than mildly elevated calcium 110.  His creatinine of 1.29 is similar to his baseline, his prior from 10/13/2022 1.3.  His urinalysis is completely negative, no epithelial cells no bacteria no WBC no RBC nitrite negative.  I discussed findings with patient.  He is comfortable discharge home.  He is encouraged to follow up with his primary care physician.  Reports he does not have 1, we will seek about getting him a referral.  Discharged with brief course of muscle relaxers. Return precautions given.  Questions invited, questions answered to the best my ability.  Patient discharged home condition stable.      Amount and/or Complexity of Data Reviewed  External Data Reviewed: notes.  Labs: ordered. Decision-making details documented in ED Course.  Radiology: ordered. Decision-making details documented in ED Course.          Scribe Attestation:   Scribe #1: I performed the above scribed service and the documentation accurately describes the services I performed. I attest to the accuracy of the note.    Attending Attestation:           Physician Attestation for Scribe:  Physician Attestation Statement for Scribe #1: I, Isidro Araujo MD, reviewed documentation, as scribed by Cyndy Sosa in my presence, and it is both accurate and complete.                        Clinical Impression:   Final diagnoses:  [N50.89] Testicular swelling  [R10.30]  Lower abdominal pain (Primary)  [N50.811, N50.812] Pain in both testicles        ED Disposition Condition    Discharge Stable          ED Prescriptions       Medication Sig Dispense Start Date End Date Auth. Provider    methocarbamoL (ROBAXIN) 500 MG Tab Take 1 tablet (500 mg total) by mouth 3 (three) times daily. for 7 days 20 tablet 1/31/2023 2/7/2023 Isidro Araujo MD          Follow-up Information       Follow up With Specialties Details Why Contact Info    [facility]  Call   [address] [address] [phone]               Isidro Araujo MD  01/31/23 0937

## 2023-01-31 NOTE — DISCHARGE INSTRUCTIONS
Thanks for letting us take care of you today!  It is our goal to give you courteous care and to keep you comfortable and informed, if you have any questions before you leave I will be happy to try and answer them.    Here is some advice after your visit:    Your visit in the emergency department is NOT definitive care - please follow-up with your primary care doctor and/or specialist within 1-2 days. Please return to the emergency department if you develop worsening symptoms including: fever, chills, chest pain, shortness of breath, weakness, numbness, tingling, nausea, vomiting, inability to eat, drink, or take your medication. Please return if you have any worsening in your condition or if you have any other concerns.    If you had radiology exams like an XRAY or CT in the emergency Department the interpreation on them may be preliminary - there may be less time sensitive findings on the reports please obtain these reports within 24 hours from the hospital or by using your out on your mobile phone to access records.  Bring these to your primary care doctor and/or specialist for further review of incidental findings.    Please review any LAB WORK from your visit today with your primary care physician.    Given prescribed Robaxin, methocarbamol, this is a muscle relaxer.  Please be careful when taking it, may make you drowsy.  Do not drive or operate heavy machinery.

## 2023-02-09 ENCOUNTER — HOSPITAL ENCOUNTER (EMERGENCY)
Facility: HOSPITAL | Age: 83
Discharge: HOME | End: 2023-02-09
Attending: STUDENT IN AN ORGANIZED HEALTH CARE EDUCATION/TRAINING PROGRAM
Payer: MEDICARE

## 2023-02-09 VITALS
HEART RATE: 56 BPM | HEIGHT: 68 IN | WEIGHT: 159 LBS | DIASTOLIC BLOOD PRESSURE: 98 MMHG | TEMPERATURE: 98 F | OXYGEN SATURATION: 100 % | RESPIRATION RATE: 16 BRPM | SYSTOLIC BLOOD PRESSURE: 169 MMHG | BODY MASS INDEX: 24.1 KG/M2

## 2023-02-09 DIAGNOSIS — R10.30 LOWER ABDOMINAL PAIN: Primary | ICD-10-CM

## 2023-02-09 DIAGNOSIS — N50.819 TESTICULAR PAIN: ICD-10-CM

## 2023-02-09 LAB
ALBUMIN SERPL-MCNC: 4 G/DL (ref 3.4–4.8)
ALBUMIN/GLOB SERPL: 1.4 RATIO (ref 1.1–2)
ALP SERPL-CCNC: 122 UNIT/L (ref 40–150)
ALT SERPL-CCNC: 15 UNIT/L (ref 0–55)
APPEARANCE UR: CLEAR
AST SERPL-CCNC: 19 UNIT/L (ref 5–34)
BACTERIA #/AREA URNS AUTO: NORMAL /HPF
BASOPHILS # BLD AUTO: 0.01 X10(3)/MCL (ref 0–0.2)
BASOPHILS NFR BLD AUTO: 0.3 %
BILIRUB UR QL STRIP.AUTO: NEGATIVE MG/DL
BILIRUBIN DIRECT+TOT PNL SERPL-MCNC: 0.9 MG/DL
BUN SERPL-MCNC: 9.7 MG/DL (ref 8.4–25.7)
CALCIUM SERPL-MCNC: 9.2 MG/DL (ref 8.8–10)
CHLORIDE SERPL-SCNC: 108 MMOL/L (ref 98–107)
CO2 SERPL-SCNC: 24 MMOL/L (ref 23–31)
COLOR UR AUTO: ABNORMAL
CREAT SERPL-MCNC: 1.36 MG/DL (ref 0.73–1.18)
EOSINOPHIL # BLD AUTO: 0.14 X10(3)/MCL (ref 0–0.9)
EOSINOPHIL NFR BLD AUTO: 4 %
ERYTHROCYTE [DISTWIDTH] IN BLOOD BY AUTOMATED COUNT: 13.7 % (ref 11.5–17)
GFR SERPLBLD CREATININE-BSD FMLA CKD-EPI: 52 MLS/MIN/1.73/M2
GLOBULIN SER-MCNC: 2.8 GM/DL (ref 2.4–3.5)
GLUCOSE SERPL-MCNC: 87 MG/DL (ref 82–115)
GLUCOSE UR QL STRIP.AUTO: NEGATIVE MG/DL
HCT VFR BLD AUTO: 46.5 % (ref 42–52)
HGB BLD-MCNC: 14.6 GM/DL (ref 14–18)
IMM GRANULOCYTES # BLD AUTO: 0 X10(3)/MCL (ref 0–0.04)
IMM GRANULOCYTES NFR BLD AUTO: 0 %
KETONES UR QL STRIP.AUTO: NEGATIVE MG/DL
LEUKOCYTE ESTERASE UR QL STRIP.AUTO: NEGATIVE UNIT/L
LIPASE SERPL-CCNC: 15 U/L
LYMPHOCYTES # BLD AUTO: 1.36 X10(3)/MCL (ref 0.6–4.6)
LYMPHOCYTES NFR BLD AUTO: 39.3 %
MCH RBC QN AUTO: 28.3 PG
MCHC RBC AUTO-ENTMCNC: 31.4 MG/DL (ref 33–36)
MCV RBC AUTO: 90.1 FL (ref 80–94)
MONOCYTES # BLD AUTO: 0.28 X10(3)/MCL (ref 0.1–1.3)
MONOCYTES NFR BLD AUTO: 8.1 %
NEUTROPHILS # BLD AUTO: 1.67 X10(3)/MCL (ref 2.1–9.2)
NEUTROPHILS NFR BLD AUTO: 48.3 %
NITRITE UR QL STRIP.AUTO: NEGATIVE
NRBC BLD AUTO-RTO: 0 %
PH UR STRIP.AUTO: 5.5 [PH]
PLATELET # BLD AUTO: 194 X10(3)/MCL (ref 130–400)
PMV BLD AUTO: 9.9 FL (ref 7.4–10.4)
POTASSIUM SERPL-SCNC: 3.7 MMOL/L (ref 3.5–5.1)
PROT SERPL-MCNC: 6.8 GM/DL (ref 5.8–7.6)
PROT UR QL STRIP.AUTO: ABNORMAL MG/DL
RBC # BLD AUTO: 5.16 X10(6)/MCL (ref 4.7–6.1)
RBC #/AREA URNS AUTO: <5 /HPF
RBC UR QL AUTO: NEGATIVE UNIT/L
SODIUM SERPL-SCNC: 143 MMOL/L (ref 136–145)
SP GR UR STRIP.AUTO: 1.03 (ref 1–1.03)
SQUAMOUS #/AREA URNS AUTO: <5 /HPF
UROBILINOGEN UR STRIP-ACNC: 0.2 MG/DL
WBC # SPEC AUTO: 3.5 X10(3)/MCL (ref 4.5–11.5)
WBC #/AREA URNS AUTO: <5 /HPF

## 2023-02-09 PROCEDURE — 25500020 PHARM REV CODE 255: Performed by: STUDENT IN AN ORGANIZED HEALTH CARE EDUCATION/TRAINING PROGRAM

## 2023-02-09 PROCEDURE — 81001 URINALYSIS AUTO W/SCOPE: CPT | Performed by: STUDENT IN AN ORGANIZED HEALTH CARE EDUCATION/TRAINING PROGRAM

## 2023-02-09 PROCEDURE — 99285 EMERGENCY DEPT VISIT HI MDM: CPT | Mod: 25

## 2023-02-09 PROCEDURE — 83690 ASSAY OF LIPASE: CPT | Performed by: STUDENT IN AN ORGANIZED HEALTH CARE EDUCATION/TRAINING PROGRAM

## 2023-02-09 PROCEDURE — 80053 COMPREHEN METABOLIC PANEL: CPT | Performed by: STUDENT IN AN ORGANIZED HEALTH CARE EDUCATION/TRAINING PROGRAM

## 2023-02-09 PROCEDURE — 85025 COMPLETE CBC W/AUTO DIFF WBC: CPT | Performed by: STUDENT IN AN ORGANIZED HEALTH CARE EDUCATION/TRAINING PROGRAM

## 2023-02-09 RX ORDER — SODIUM CHLORIDE 0.9 % (FLUSH) 0.9 %
10 SYRINGE (ML) INJECTION
Status: DISCONTINUED | OUTPATIENT
Start: 2023-02-09 | End: 2023-02-09 | Stop reason: HOSPADM

## 2023-02-09 RX ORDER — METHOCARBAMOL 500 MG/1
500 TABLET, FILM COATED ORAL 3 TIMES DAILY
Qty: 15 TABLET | Refills: 0 | Status: SHIPPED | OUTPATIENT
Start: 2023-02-09 | End: 2023-02-14

## 2023-02-09 RX ADMIN — IOPAMIDOL 100 ML: 755 INJECTION, SOLUTION INTRAVENOUS at 02:02

## 2023-02-09 NOTE — ED PROVIDER NOTES
Encounter Date: 2/9/2023    SCRIBE #1 NOTE: I, Alina Rivas, am scribing for, and in the presence of,  Isidro Araujo MD. I have scribed the following portions of the note - Other sections scribed: HPI, ROS and physical.     History     Chief Complaint   Patient presents with    Abdominal Pain     Complaining of abdominal and groin pain x1 week; seen here 1 week ago for same; states pain was relieved but returned     83 y/o male with a medical hx HLD and HTN presents to the ED for abdominal and testicular pain. The pt states that the pain he is experiencing today resembles that of the pain  presented to [facility] on 1/31/23. The pt states that the medication he was prescribed subsided the pain, but after he stopped taking it the pain came back. He reports that the abdominal pain is intermittent, and is not consistent with a big mass or bulge. He states that the testicular pain is present when his testicles pull upwards. Denies n/v, fever, chills, change in BM, and change in urinary output.     Chart review reveals history of CAD, AAA status post repair, partial right nephrectomy from a renal cell carcinoma.  Other than patient's visit on 01/31/2023, I do not see any past medical history or past visits for abdominal pain or any other urogenital complaints.        The history is provided by the patient. No  was used.   Abdominal Pain  The current episode started several weeks ago. The onset of the illness was abrupt. The problem has not changed since onset.The abdominal pain is generalized. The abdominal pain does not radiate. The abdominal pain is relieved by nothing. The other symptoms of the illness do not include fever, nausea or vomiting.   The patient has not had a change in bowel habit. Risk factors for an acute abdominal problem include being elderly and a history of abdominal surgery. Symptoms associated with the illness do not include chills.   Review of patient's allergies indicates:  No  Known Allergies  Past Medical History:   Diagnosis Date    HLD (hyperlipidemia)     Hypertension      Past Surgical History:   Procedure Laterality Date    ABDOMINAL SURGERY      LEFT HEART CATHETERIZATION Left 11/11/2022    Procedure: CATHETERIZATION, HEART, LEFT;  Surgeon: Colby Cook MD;  Location: [facility] CATH LAB;  Service: Cardiology;  Laterality: Left;  LHC +/- PCI    NEPHRECTOMY Right      No family history on file.  Social History     Tobacco Use    Smoking status: Never    Smokeless tobacco: Never   Substance Use Topics    Alcohol use: Never    Drug use: Never     Review of Systems   Constitutional:  Negative for chills and fever.   Gastrointestinal:  Positive for abdominal pain. Negative for nausea and vomiting.   Genitourinary:  Positive for testicular pain. Negative for difficulty urinating.     Physical Exam     Initial Vitals [02/09/23 0903]   BP Pulse Resp Temp SpO2   (!) 175/96 70 18 98.1 °F (36.7 °C) 96 %      MAP       --         Physical Exam    Nursing note and vitals reviewed.  Constitutional: He appears well-developed and well-nourished. He is not diaphoretic. No distress.   HENT:   Head: Normocephalic and atraumatic.   Right Ear: External ear normal.   Left Ear: External ear normal.   Nose: Nose normal.   Mouth/Throat: Oropharynx is clear and moist.   Eyes: Conjunctivae and EOM are normal. Pupils are equal, round, and reactive to light. Right eye exhibits no discharge. Left eye exhibits no discharge.   Neck: Neck supple. No tracheal deviation present.   Normal range of motion.  Cardiovascular:  Normal rate, regular rhythm, normal heart sounds and intact distal pulses.     Exam reveals no gallop and no friction rub.       No murmur heard.  Pulmonary/Chest: Breath sounds normal. No stridor. No respiratory distress. He has no wheezes. He has no rhonchi. He has no rales. He exhibits no tenderness.   Abdominal: Abdomen is soft. Bowel sounds are normal. He exhibits no distension and no mass. A  surgical scar is present. There is no abdominal tenderness.   Multiple well healed surgical scar There is no guarding.   Genitourinary:    Testes and penis normal.   Right testis shows no swelling and no tenderness. Left testis shows no swelling and no tenderness.   Musculoskeletal:         General: No tenderness or edema. Normal range of motion.      Cervical back: Normal range of motion and neck supple.     Neurological: He is alert and oriented to person, place, and time. No cranial nerve deficit or sensory deficit.   Skin: Skin is warm and dry. Capillary refill takes less than 2 seconds. No rash noted. No erythema. No pallor.       ED Course   Procedures  Labs Reviewed   URINALYSIS, REFLEX TO URINE CULTURE - Abnormal; Notable for the following components:       Result Value    Protein, UA Trace (*)     All other components within normal limits   COMPREHENSIVE METABOLIC PANEL - Abnormal; Notable for the following components:    Chloride 108 (*)     Creatinine 1.36 (*)     All other components within normal limits   CBC WITH DIFFERENTIAL - Abnormal; Notable for the following components:    WBC 3.5 (*)     MCHC 31.4 (*)     Neut # 1.67 (*)     All other components within normal limits   LIPASE - Normal   URINALYSIS, MICROSCOPIC - Normal   CBC W/ AUTO DIFFERENTIAL    Narrative:     The following orders were created for panel order CBC auto differential.  Procedure                               Abnormality         Status                     ---------                               -----------         ------                     CBC with Differential[[id number]]        Abnormal            Final result                 Please view results for these tests on the individual orders.          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 stable liver cyst    Cholelithiasis but no convincing evidence of cholecystitis    Abdominal aortic aneurysm appears stable    Prostatic hypertrophy with heterogeneous appearing prostate    Simple cysts in both kidneys      Electronically signed by: Lukasz Ahuja  Date:    02/09/2023  Time:    15:10               Narrative:    EXAMINATION:  CT ABDOMEN PELVIS WITH CONTRAST    CLINICAL HISTORY:  Abdominal pain, acute, nonlocalized;Lower Abd pain, multiple surgeries;    TECHNIQUE:  Low dose axial images, sagittal and coronal reformations were obtained from the lung bases to the pubic symphysis following the IV administration of contrast. Automatic exposure control (AEC) is utilized to reduce patient radiation exposure.    COMPARISON:  01/31/2023    FINDINGS:  The lung bases are clear.    The liver is normal in size.  There is a hemangioma in the dome of the liver.  There is another hemangioma seen in segment 4.  There is a cyst seen in segment 2 of the liver.  These findings are unchanged since prior examination..  Portal and hepatic veins appear normal.    The gallbladder is normal in size.  There is a gallstone in the gallbladder..  No biliary dilatation is seen.  No pericholecystic fluid is seen.    The pancreas appears normal.  No pancreatic mass or lesion is seen.    The spleen shows no acute abnormality.    The adrenal glands appear normal.  No adrenal nodule is seen.    The kidneys appear normal.  There is some simple cysts seen in both kidneys.  No hydronephrosis is seen.  No hydroureter is seen.  No nephrolithiasis is seen.  No obvious ureteral stones are seen.    There is an abdominal aortic aneurysm seen with an associated mural thrombus.  Abdominal aortic aneurysm has a maximum dimension of 3.6 cm.  It is unchanged since the prior examination.    Urinary bladder appears grossly unremarkable.    No colitis is seen.  No diverticulitis is seen.  No obvious colonic mass or lesion is seen.  The  appendix appears normal.    The prostate is enlarged in size and heterogeneous.    No free air is seen.  No free fluid is seen.                                       US Scrotum And Testicles (Final result)  Result time 02/09/23 14:41:18      Final result by Ruddy Bradford MD (02/09/23 14:41:18)                   Impression:      No acute sonographic abnormality identified.      Electronically signed by: Ruddy Bradford  Date:    02/09/2023  Time:    14:41               Narrative:    EXAMINATION:  US SCROTUM AND TESTICLES    CLINICAL HISTORY:  Testicular pain, unspecified    TECHNIQUE:  Gray-scale, color Doppler, and spectral waveform tracings of the scrotum.    COMPARISON:  Ultrasound 01/31/2023 and 03/20/2017    FINDINGS:  Multi-cystic lesion in the left testicle measures 13 x 7 x 10 mm, stable since 2017.  Few testicular microcalcifications.  Testicular blood flow documented bilaterally on Doppler evaluation. Epididymides within normal limits. No significant hydrocele.    Measurements:    Right testicle: 2.7 x 1.2 x 1.8 cm    Left testicle: 2.7 x 1.2 x 2.2 cm                                       Medications   sodium chloride 0.9% flush 10 mL (has no administration in time range)   iopamidoL (ISOVUE-370) injection 100 mL (100 mLs Intravenous Given 2/9/23 1458)              Scribe Attestation:   Scribe #1: I performed the above scribed service and the documentation accurately describes the services I performed. I attest to the accuracy of the note.    Attending Attestation:           Physician Attestation for Scribe:  Physician Attestation Statement for Scribe #1: I, Isidro Araujo MD, reviewed documentation, as scribed by Alina Rivas in my presence, and it is both accurate and complete.                      Medical Decision Making  Patient presents with lower abdominal pain testicular pain, seen recently for same.      Differential diagnosis to include but not limited to Testicular torsion, orchitis,  varicocele, diverticulitis, UTI, SBO    Patient is awake alert well-appearing.  He was seen recently by myself for very similar complaint.  His vitals are stable he is afebrile.  However given his advanced age and history of AAA he is offered CT scan once again and he accepts this.  The CT scan is unrevealing for any significant changes, his AAA appears to be stable.  He is stable distal pulses.  The physical exam of his of his testicles are unrevealing, ultrasound once again is negative for any torsion, good flow bilaterally.  The remainder of his labs are within normal limits.  Patient remains without any leukocytosis.  His hemoglobin stable 14.5 compared to 14.3 on the 31st of last month.  He has no left shift.  He is not neutropenic.  Creatinine mildly elevated when compared to his prior, 1.29 today is 1.36.  BUN within normal limits no significant electrolyte abnormalities, chloride once again mildly elevated 108, prior was 110.  Urinalysis not reveal any evidence of urinary tract infection no bacteria WBCs are epithelial cells are noted.  Discussed findings with the patient.  Strongly urged him to follow up with the primary care physician we once again seek about getting him follow-up.  He is also given the number for a urologist she continued to have this testicular pain.  Patient is amenable to plan. Return precautions given.  Questions invited, questions answered to the best my ability.  Patient discharged home condition stable.      Amount and/or Complexity of Data Reviewed  External Data Reviewed: labs, radiology and notes.  Labs: ordered. Decision-making details documented in ED Course.  Radiology: ordered and independent interpretation performed. Decision-making details documented in ED Course.       Clinical Impression:   Final diagnoses:  [N50.819] Testicular pain  [R10.30] Lower abdominal pain (Primary)        ED Disposition Condition    Discharge Stable          ED Prescriptions       Medication Sig  Dispense Start Date End Date Auth. Provider    methocarbamoL (ROBAXIN) 500 MG Tab Take 1 tablet (500 mg total) by mouth 3 (three) times daily. for 5 days 15 tablet 2/9/2023 2/14/2023 Isidro Araujo MD          Follow-up Information       Follow up With Specialties Details Why Contact Info    [facility]  Call   [address] [id number] [phone]      Ritchie Mclean MD Urology Call   [address] [address]  [phone]               Isidro Araujo MD  02/09/23 4908

## 2023-02-09 NOTE — DISCHARGE INSTRUCTIONS
Thanks for letting us take care of you today!  It is our goal to give you courteous care and to keep you comfortable and informed, if you have any questions before you leave I will be happy to try and answer them.    Here is some advice after your visit:    Your visit in the emergency department is NOT definitive care - please follow-up with your primary care doctor and/or specialist within 1-2 days. Please return to the emergency department if you develop worsening symptoms including: fever, chills, chest pain, shortness of breath, weakness, numbness, tingling, nausea, vomiting, inability to eat, drink, or take your medication. Please return if you have any worsening in your condition or if you have any other concerns.    If you had radiology exams like an XRAY or CT in the emergency Department the interpreation on them may be preliminary - there may be less time sensitive findings on the reports please obtain these reports within 24 hours from the hospital or by using your out on your mobile phone to access records.  Bring these to your primary care doctor and/or specialist for further review of incidental findings.    Please review any LAB WORK from your visit today with your primary care physician.    Please Follow up with your primary care provider (PCP), if you do not have a PCP, the contact information for the [facility] family medicine clinic is provided, you may call to schedule an appointment to establish care.    You may also consider following up with urologist, contact information for 1 is provided.  Please call to make an appointment.

## 2023-03-29 RX ORDER — ATORVASTATIN CALCIUM 80 MG/1
80 TABLET, FILM COATED ORAL
COMMUNITY
Start: 2023-01-24

## 2023-07-22 ENCOUNTER — HOSPITAL ENCOUNTER (INPATIENT)
Facility: HOSPITAL | Age: 83
LOS: 1 days | Discharge: HOME | DRG: 282 | End: 2023-07-23
Attending: STUDENT IN AN ORGANIZED HEALTH CARE EDUCATION/TRAINING PROGRAM | Admitting: HOSPITALIST
Payer: MEDICARE

## 2023-07-22 DIAGNOSIS — R53.1 WEAKNESS: ICD-10-CM

## 2023-07-22 DIAGNOSIS — R07.9 CHEST PAIN, RULE OUT ACUTE MYOCARDIAL INFARCTION: Primary | ICD-10-CM

## 2023-07-22 DIAGNOSIS — R42 DIZZINESS: ICD-10-CM

## 2023-07-22 DIAGNOSIS — R53.1 GENERALIZED WEAKNESS: ICD-10-CM

## 2023-07-22 LAB
ALBUMIN SERPL-MCNC: 3.7 G/DL (ref 3.4–4.8)
ALBUMIN/GLOB SERPL: 1.2 RATIO (ref 1.1–2)
ALP SERPL-CCNC: 127 UNIT/L (ref 40–150)
ALT SERPL-CCNC: 11 UNIT/L (ref 0–55)
APPEARANCE UR: CLEAR
AST SERPL-CCNC: 30 UNIT/L (ref 5–34)
BACTERIA #/AREA URNS AUTO: NORMAL /HPF
BASOPHILS # BLD AUTO: 0.01 X10(3)/MCL
BASOPHILS NFR BLD AUTO: 0.3 %
BILIRUB UR QL STRIP.AUTO: NEGATIVE
BILIRUBIN DIRECT+TOT PNL SERPL-MCNC: 0.9 MG/DL
BNP BLD-MCNC: 11 PG/ML
BUN SERPL-MCNC: 16.2 MG/DL (ref 8.4–25.7)
CALCIUM SERPL-MCNC: 8.9 MG/DL (ref 8.8–10)
CHLORIDE SERPL-SCNC: 106 MMOL/L (ref 98–107)
CO2 SERPL-SCNC: 21 MMOL/L (ref 23–31)
COLOR UR: YELLOW
CREAT SERPL-MCNC: 1.46 MG/DL (ref 0.73–1.18)
EOSINOPHIL # BLD AUTO: 0.12 X10(3)/MCL (ref 0–0.9)
EOSINOPHIL NFR BLD AUTO: 3.4 %
ERYTHROCYTE [DISTWIDTH] IN BLOOD BY AUTOMATED COUNT: 13.5 % (ref 11.5–17)
FLUAV AG UPPER RESP QL IA.RAPID: NOT DETECTED
FLUBV AG UPPER RESP QL IA.RAPID: NOT DETECTED
GFR SERPLBLD CREATININE-BSD FMLA CKD-EPI: 47 MLS/MIN/1.73/M2
GLOBULIN SER-MCNC: 3.1 GM/DL (ref 2.4–3.5)
GLUCOSE SERPL-MCNC: 95 MG/DL (ref 82–115)
GLUCOSE UR QL STRIP.AUTO: NEGATIVE
HCT VFR BLD AUTO: 41 % (ref 42–52)
HGB BLD-MCNC: 12.8 G/DL (ref 14–18)
IMM GRANULOCYTES # BLD AUTO: 0.01 X10(3)/MCL (ref 0–0.04)
IMM GRANULOCYTES NFR BLD AUTO: 0.3 %
KETONES UR QL STRIP.AUTO: NEGATIVE
LACTATE SERPL-SCNC: 1.8 MMOL/L (ref 0.5–2.2)
LEUKOCYTE ESTERASE UR QL STRIP.AUTO: NEGATIVE
LYMPHOCYTES # BLD AUTO: 1.24 X10(3)/MCL (ref 0.6–4.6)
LYMPHOCYTES NFR BLD AUTO: 35.2 %
MAGNESIUM SERPL-MCNC: 2 MG/DL (ref 1.6–2.6)
MCH RBC QN AUTO: 28.8 PG (ref 27–31)
MCHC RBC AUTO-ENTMCNC: 31.2 G/DL (ref 33–36)
MCV RBC AUTO: 92.3 FL (ref 80–94)
MONOCYTES # BLD AUTO: 0.37 X10(3)/MCL (ref 0.1–1.3)
MONOCYTES NFR BLD AUTO: 10.5 %
NEUTROPHILS # BLD AUTO: 1.77 X10(3)/MCL (ref 2.1–9.2)
NEUTROPHILS NFR BLD AUTO: 50.3 %
NITRITE UR QL STRIP.AUTO: NEGATIVE
NRBC BLD AUTO-RTO: 0 %
PH UR STRIP.AUTO: 5.5 [PH]
PLATELET # BLD AUTO: 166 X10(3)/MCL (ref 130–400)
PMV BLD AUTO: 10.7 FL (ref 7.4–10.4)
POTASSIUM SERPL-SCNC: 4.6 MMOL/L (ref 3.5–5.1)
PROT SERPL-MCNC: 6.8 GM/DL (ref 5.8–7.6)
PROT UR QL STRIP.AUTO: NEGATIVE
RBC # BLD AUTO: 4.44 X10(6)/MCL (ref 4.7–6.1)
RBC #/AREA URNS AUTO: NORMAL /HPF
RBC UR QL AUTO: NEGATIVE
SARS-COV-2 RNA RESP QL NAA+PROBE: NOT DETECTED
SODIUM SERPL-SCNC: 138 MMOL/L (ref 136–145)
SP GR UR STRIP.AUTO: 1.01 (ref 1–1.03)
SQUAMOUS #/AREA URNS AUTO: NORMAL /HPF
TROPONIN I SERPL-MCNC: 0.04 NG/ML (ref 0–0.04)
TROPONIN I SERPL-MCNC: 0.07 NG/ML (ref 0–0.04)
TROPONIN I SERPL-MCNC: <0.01 NG/ML (ref 0–0.04)
UROBILINOGEN UR STRIP-ACNC: 0.2
WBC # SPEC AUTO: 3.52 X10(3)/MCL (ref 4.5–11.5)
WBC #/AREA URNS AUTO: NORMAL /HPF

## 2023-07-22 PROCEDURE — 96360 HYDRATION IV INFUSION INIT: CPT | Mod: 59

## 2023-07-22 PROCEDURE — 93010 EKG 12-LEAD: ICD-10-PCS | Mod: ,,, | Performed by: INTERNAL MEDICINE

## 2023-07-22 PROCEDURE — 83880 ASSAY OF NATRIURETIC PEPTIDE: CPT | Performed by: STUDENT IN AN ORGANIZED HEALTH CARE EDUCATION/TRAINING PROGRAM

## 2023-07-22 PROCEDURE — 11000001 HC ACUTE MED/SURG PRIVATE ROOM

## 2023-07-22 PROCEDURE — 25000003 PHARM REV CODE 250: Performed by: STUDENT IN AN ORGANIZED HEALTH CARE EDUCATION/TRAINING PROGRAM

## 2023-07-22 PROCEDURE — 84484 ASSAY OF TROPONIN QUANT: CPT | Mod: 91 | Performed by: PHYSICIAN ASSISTANT

## 2023-07-22 PROCEDURE — 81001 URINALYSIS AUTO W/SCOPE: CPT | Performed by: STUDENT IN AN ORGANIZED HEALTH CARE EDUCATION/TRAINING PROGRAM

## 2023-07-22 PROCEDURE — 83735 ASSAY OF MAGNESIUM: CPT | Performed by: STUDENT IN AN ORGANIZED HEALTH CARE EDUCATION/TRAINING PROGRAM

## 2023-07-22 PROCEDURE — 85025 COMPLETE CBC W/AUTO DIFF WBC: CPT | Performed by: STUDENT IN AN ORGANIZED HEALTH CARE EDUCATION/TRAINING PROGRAM

## 2023-07-22 PROCEDURE — 25000003 PHARM REV CODE 250: Performed by: INTERNAL MEDICINE

## 2023-07-22 PROCEDURE — 84484 ASSAY OF TROPONIN QUANT: CPT | Performed by: STUDENT IN AN ORGANIZED HEALTH CARE EDUCATION/TRAINING PROGRAM

## 2023-07-22 PROCEDURE — 80053 COMPREHEN METABOLIC PANEL: CPT | Performed by: STUDENT IN AN ORGANIZED HEALTH CARE EDUCATION/TRAINING PROGRAM

## 2023-07-22 PROCEDURE — 63600175 PHARM REV CODE 636 W HCPCS: Performed by: INTERNAL MEDICINE

## 2023-07-22 PROCEDURE — 63600175 PHARM REV CODE 636 W HCPCS: Performed by: STUDENT IN AN ORGANIZED HEALTH CARE EDUCATION/TRAINING PROGRAM

## 2023-07-22 PROCEDURE — 0240U COVID/FLU A&B PCR: CPT | Performed by: STUDENT IN AN ORGANIZED HEALTH CARE EDUCATION/TRAINING PROGRAM

## 2023-07-22 PROCEDURE — 83605 ASSAY OF LACTIC ACID: CPT | Performed by: STUDENT IN AN ORGANIZED HEALTH CARE EDUCATION/TRAINING PROGRAM

## 2023-07-22 PROCEDURE — 99285 EMERGENCY DEPT VISIT HI MDM: CPT | Mod: 25

## 2023-07-22 PROCEDURE — 93010 ELECTROCARDIOGRAM REPORT: CPT | Mod: ,,, | Performed by: INTERNAL MEDICINE

## 2023-07-22 PROCEDURE — 25500020 PHARM REV CODE 255: Performed by: STUDENT IN AN ORGANIZED HEALTH CARE EDUCATION/TRAINING PROGRAM

## 2023-07-22 RX ORDER — ISOSORBIDE DINITRATE 20 MG/1
20 TABLET ORAL 2 TIMES DAILY
Status: DISCONTINUED | OUTPATIENT
Start: 2023-07-23 | End: 2023-07-23

## 2023-07-22 RX ORDER — PANTOPRAZOLE SODIUM 40 MG/1
40 TABLET, DELAYED RELEASE ORAL DAILY
Status: DISCONTINUED | OUTPATIENT
Start: 2023-07-23 | End: 2023-07-23 | Stop reason: HOSPADM

## 2023-07-22 RX ORDER — SODIUM CHLORIDE 0.9 % (FLUSH) 0.9 %
10 SYRINGE (ML) INJECTION
Status: DISCONTINUED | OUTPATIENT
Start: 2023-07-22 | End: 2023-07-23 | Stop reason: HOSPADM

## 2023-07-22 RX ORDER — PANTOPRAZOLE SODIUM 40 MG/1
40 TABLET, DELAYED RELEASE ORAL DAILY
COMMUNITY

## 2023-07-22 RX ORDER — ASPIRIN 325 MG
325 TABLET, DELAYED RELEASE (ENTERIC COATED) ORAL
Status: COMPLETED | OUTPATIENT
Start: 2023-07-22 | End: 2023-07-22

## 2023-07-22 RX ORDER — VALSARTAN 80 MG/1
320 TABLET ORAL DAILY
Status: DISCONTINUED | OUTPATIENT
Start: 2023-07-23 | End: 2023-07-23 | Stop reason: HOSPADM

## 2023-07-22 RX ORDER — TALC
6 POWDER (GRAM) TOPICAL NIGHTLY PRN
Status: DISCONTINUED | OUTPATIENT
Start: 2023-07-22 | End: 2023-07-23 | Stop reason: HOSPADM

## 2023-07-22 RX ORDER — ATORVASTATIN CALCIUM 40 MG/1
80 TABLET, FILM COATED ORAL DAILY
Status: DISCONTINUED | OUTPATIENT
Start: 2023-07-23 | End: 2023-07-23 | Stop reason: HOSPADM

## 2023-07-22 RX ORDER — ASPIRIN 81 MG/1
81 TABLET ORAL DAILY
Status: DISCONTINUED | OUTPATIENT
Start: 2023-07-23 | End: 2023-07-23 | Stop reason: HOSPADM

## 2023-07-22 RX ORDER — ACETAMINOPHEN 500 MG
1000 TABLET ORAL
Status: COMPLETED | OUTPATIENT
Start: 2023-07-22 | End: 2023-07-22

## 2023-07-22 RX ORDER — ENOXAPARIN SODIUM 100 MG/ML
40 INJECTION SUBCUTANEOUS EVERY 24 HOURS
Status: DISCONTINUED | OUTPATIENT
Start: 2023-07-22 | End: 2023-07-23 | Stop reason: HOSPADM

## 2023-07-22 RX ADMIN — ENOXAPARIN SODIUM 40 MG: 40 INJECTION SUBCUTANEOUS at 10:07

## 2023-07-22 RX ADMIN — Medication 6 MG: at 10:07

## 2023-07-22 RX ADMIN — IOPAMIDOL 100 ML: 755 INJECTION, SOLUTION INTRAVENOUS at 02:07

## 2023-07-22 RX ADMIN — SODIUM CHLORIDE, POTASSIUM CHLORIDE, SODIUM LACTATE AND CALCIUM CHLORIDE 1000 ML: 600; 310; 30; 20 INJECTION, SOLUTION INTRAVENOUS at 12:07

## 2023-07-22 RX ADMIN — ACETAMINOPHEN 1000 MG: 500 TABLET, FILM COATED ORAL at 01:07

## 2023-07-22 RX ADMIN — ASPIRIN 325 MG: 325 TABLET, COATED ORAL at 04:07

## 2023-07-22 NOTE — ED PROVIDER NOTES
Encounter Date: 7/22/2023    SCRIBE #1 NOTE: I, Julio Cesar Kelly, am scribing for, and in the presence of,  Dr. Moore. I have scribed the following portions of the note - Other sections scribed: HPI, ROS, Physical Exam, MDM, Attending.     History     Chief Complaint   Patient presents with    Weakness     Pt to ER via [facility] for weakness.  Started yesterday.  Also dysuria for 2 days.  GCS 15.  EMS CBG 68, gave tube of oral glucose, pt states he took meds today, but did not eat     82 y/o male with history of HTN, HLD, AAA s/p repair and renal cancer s/p nephrectomy presents to ED via EMS for generalized weakness onset yesterday.  Pt also complains of mild dysuria onset 2 days ago, along with abdominal pain and leg swelling.  His CBG was 68.  He did take his meds today, but he did not eat anything.  He denies hematuria, fever, chills, nausea, vomiting, scrotal swelling or recent falls.  His nephrologist is Dr. Laguerre.    The history is provided by the patient.   Review of patient's allergies indicates:  No Known Allergies  Past Medical History:   Diagnosis Date    HLD (hyperlipidemia)     Hypertension      Past Surgical History:   Procedure Laterality Date    ABDOMINAL SURGERY      LEFT HEART CATHETERIZATION Left 11/11/2022    Procedure: CATHETERIZATION, HEART, LEFT;  Surgeon: Colby Cook MD;  Location: [facility] CATH LAB;  Service: Cardiology;  Laterality: Left;  LHC +/- PCI    NEPHRECTOMY Right      No family history on file.  Social History     Tobacco Use    Smoking status: Never    Smokeless tobacco: Never   Substance Use Topics    Alcohol use: Never    Drug use: Never     Review of Systems   Constitutional:  Negative for chills and fever.        Generalized weakness    Cardiovascular:  Positive for leg swelling.   Gastrointestinal:  Positive for abdominal pain. Negative for nausea and vomiting.   Genitourinary:  Positive for dysuria (mild). Negative for hematuria and scrotal swelling.     Physical Exam      Initial Vitals [07/22/23 0755]   BP Pulse Resp Temp SpO2   111/66 60 16 97.7 °F (36.5 °C) 99 %      MAP       --         Physical Exam    Nursing note and vitals reviewed.  Constitutional: He appears well-developed. He is not diaphoretic. No distress.   Elderly    HENT:   Head: Normocephalic and atraumatic.   Nose: Nose normal.   Dry mucous membranes    Eyes: Conjunctivae and EOM are normal. Pupils are equal, round, and reactive to light.   Cardiovascular:  Regular rhythm.           No murmur heard.  Bradycardic    Pulmonary/Chest: Breath sounds normal. No respiratory distress. He has no wheezes. He has no rales.   Abdominal: Abdomen is soft. He exhibits no distension. There is no abdominal tenderness.   Diffuse soreness; no localized tenderness; 2x well-healed surgical incisions  There is no rebound and no guarding.   Musculoskeletal:      Comments: Compression stockings bilaterally      Neurological: He is alert and oriented to person, place, and time. He has normal strength. No cranial nerve deficit.   Skin: Skin is warm. Capillary refill takes less than 2 seconds. No rash noted.       ED Course   Critical Care    Date/Time: 7/22/2023 3:00 PM  Performed by: Garrison Moore MD  Authorized by: Garrison Moore MD   Direct patient critical care time: 35 minutes  Total critical care time (exclusive of procedural time) : 35 minutes  Critical care time was exclusive of separately billable procedures and treating other patients.  Critical care was necessary to treat or prevent imminent or life-threatening deterioration of the following conditions: cardiac failure.  Critical care was time spent personally by me on the following activities: blood draw for specimens, development of treatment plan with patient or surrogate, discussions with consultants, evaluation of patient's response to treatment, examination of patient, obtaining history from patient or surrogate, ordering and performing treatments and  interventions, ordering and review of laboratory studies, ordering and review of radiographic studies, pulse oximetry and re-evaluation of patient's condition.      Labs Reviewed   COMPREHENSIVE METABOLIC PANEL - Abnormal; Notable for the following components:       Result Value    Carbon Dioxide 21 (*)     Creatinine 1.46 (*)     All other components within normal limits   CBC WITH DIFFERENTIAL - Abnormal; Notable for the following components:    WBC 3.52 (*)     RBC 4.44 (*)     Hgb 12.8 (*)     Hct 41.0 (*)     MCHC 31.2 (*)     MPV 10.7 (*)     Neut # 1.77 (*)     All other components within normal limits   TROPONIN I - Abnormal; Notable for the following components:    Troponin-I 0.074 (*)     All other components within normal limits   URINALYSIS, REFLEX TO URINE CULTURE - Normal   TROPONIN I - Normal   COVID/FLU A&B PCR - Normal    Narrative:     The Xpert Xpress SARS-CoV-2/FLU/RSV plus is a rapid, multiplexed real-time PCR test intended for the simultaneous qualitative detection and differentiation of SARS-CoV-2, Influenza A, Influenza B, and respiratory syncytial virus (RSV) viral RNA in either nasopharyngeal swab or nasal swab specimens.         MAGNESIUM - Normal   B-TYPE NATRIURETIC PEPTIDE - Normal   LACTIC ACID, PLASMA - Normal   URINALYSIS, MICROSCOPIC - Normal   CBC W/ AUTO DIFFERENTIAL    Narrative:     The following orders were created for panel order CBC auto differential.  Procedure                               Abnormality         Status                     ---------                               -----------         ------                     CBC with Differential[[id number]]        Abnormal            Final result                 Please view results for these tests on the individual orders.        ECG Results              EKG 12-lead (Final result)  Result time 07/22/23 12:36:05      Final result by Interface, Lab In [facility] (07/22/23 12:36:05)                   Narrative:    Test Reason :  R42,    Vent. Rate : 055 BPM     Atrial Rate : 055 BPM     P-R Int : 166 ms          QRS Dur : 116 ms      QT Int : 416 ms       P-R-T Axes : 082 067 061 degrees     QTc Int : 397 ms    Sinus bradycardia  Confirmed by Robb Reyes MD ([id number]) on 7/22/2023 12:35:55 PM    Referred By: [email or web address]   SELF           Confirmed By:Robb Reyes MD                                  Imaging Results              CT Abdomen Pelvis With Contrast (Final result)  Result time 07/22/23 14:18:41      Final result by Rohan Maxwell MD (07/22/23 14:18:41)                   Impression:      Aneurysmal dilatation of the aorta stable from the previous study.    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  The exact degree is difficult to determine.    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    Definite acute abnormality is not seen      Electronically signed by: Rohan Maxwell MD  Date:    07/22/2023  Time:    14:18               Narrative:    EXAMINATION:  CT ABDOMEN PELVIS WITH CONTRAST    CLINICAL HISTORY:  Abdominal pain, acute, nonlocalized;    TECHNIQUE:  Low dose axial images, sagittal and coronal reformations were obtained from the lung bases to the pubic symphysis following the IV administration of 100 mL of Isovue 370 no oral contrast is given.    Automatic exposure control (AEC) was utilized for dose reduction.    Dose: 272 mGycm    COMPARISON:  02/09/2023    FINDINGS:  Lung bases appear clear.  There are 2 lesions in the superior right lobe of the liver most consistent with an a hemangiomas.  There is a lesion in the left lobe of the liver consistent with a cyst.  Spleen appears normal.  Pancreas appears normal.  Biliary system appears normal.  The adrenals are not enlarged.  There is a right renal cyst    There is aneurysmal dilatation of the abdominal aorta measuring 3.1 cm with mural thrombus.  There is narrowing of the origin of the celiac artery.  The exact degree of stenosis cannot be determined.  There is good flow present in the superior  mesenteric artery.  There is a single renal artery bilaterally with good flow present.    There is mild prominence of the prostate the appendix appears normal.                                       X-Ray Chest AP Portable (Final result)  Result time 07/22/23 10:33:23      Final result by Nadir Cheung MD (07/22/23 10:33:23)                   Impression:      No acute cardiopulmonary process.      Electronically signed by: Nadir Cheung  Date:    07/22/2023  Time:    10:33               Narrative:    EXAMINATION:  XR CHEST AP PORTABLE    CLINICAL HISTORY:  Weakness    TECHNIQUE:  Single view of the chest    COMPARISON:  08/05/2022    FINDINGS:  No focal opacification, pleural effusion, or pneumothorax.    The cardiomediastinal silhouette is within normal limits.    No acute osseous abnormality.                                       CT Head Without Contrast (Final result)  Result time 07/22/23 09:45:57      Final result by Nadir Cheung MD (07/22/23 09:45:57)                   Impression:      No acute intracranial abnormality identified.  Findings of chronic microvascular ischemic disease.      Electronically signed by: Nadir Cheung  Date:    07/22/2023  Time:    09:45               Narrative:    EXAMINATION:  CT HEAD WITHOUT CONTRAST    CLINICAL HISTORY:  weakness;    TECHNIQUE:  Low dose axial images were obtained through the head.  Coronal and sagittal reformations were also performed. Contrast was not administered.    Automatic exposure control was utilized to reduce the patient's radiation dose.    DLP= 1115    COMPARISON:  10/13/2022    FINDINGS:  No acute intracranial hemorrhage, edema or mass. No acute parenchymal abnormality.    Diffuse cerebral atrophy with concordant ventricular enlargement.    Scattered hypodensities throughout the deep periventricular white matter.    The osseous structures are normal.    The mastoid air cells are clear.    The auditory canals are patent bilaterally.    The  globes and orbital contents are normal bilaterally.    The visualized maxillary, ethmoid and sphenoid sinuses are clear.                                       Medications   lactated ringers bolus 1,000 mL (0 mLs Intravenous Stopped 7/22/23 1346)   acetaminophen tablet 1,000 mg (1,000 mg Oral Given 7/22/23 1343)   iopamidoL (ISOVUE-370) injection 100 mL (100 mLs Intravenous Given 7/22/23 1409)   aspirin EC tablet 325 mg (325 mg Oral Given 7/22/23 1635)              Scribe Attestation:   Scribe #1: I performed the above scribed service and the documentation accurately describes the services I performed. I attest to the accuracy of the note.    Attending Attestation:           Physician Attestation for Scribe:  Physician Attestation Statement for Scribe #1: I, reviewed documentation, as scribed by Julio Cesar Kelly in my presence, and it is both accurate and complete.         Medical Decision Making  Amount and/or Complexity of Data Reviewed  Labs: ordered. Decision-making details documented in ED Course.  Radiology: ordered. Decision-making details documented in ED Course.  ECG/medicine tests:  Decision-making details documented in ED Course.    Risk  OTC drugs.  Prescription drug management.  Decision regarding hospitalization.        Differential diagnosis (includes but is not limited to):   ACS, arrhythmia, dehydration, kidney injury, electrolyte abnormalities, hypoglycemia    MDM Narrative  EKG reviewed.  Labs pending.  Telemetry monitoring independently reviewed and shows a sinus rhythm with heart rate in the 50s.  X-ray pending.  Pain and nausea control as needed.  IV fluid rehydration ordered.  CTs of the head and A/P pending further characterization of the patient's symptoms.  Patient denies any chest pain or shortness of breath currently.  Patient is currently maintaining normal oxygen saturations on room air with no evidence of respiratory distress.    Update:  Labs reviewed.  His troponin negative, however  repeat troponin mildly elevated at 0.074.  Cardiology consulted recommends troponin trending, agrees with aspirin, and they will follow in consult.  Remainder of the patient's labs have been reviewed.  CTs reviewed.  Patient does continue to report some generalized weakness but again denies any chest pain or shortness of breath.  Patient will be admitted for observation for further evaluation and management.  Patient agrees with plan for admission.  Hospital Medicine consulted and has accepted the patient.    Dispo:  Admit    My independent radiology interpretation:  As above  Point of care US (independently performed and interpreted):   Decision rules/clinical scoring:     Sepsis Perfusion Assessment:     Amount and/or Complexity of Data Reviewed  Independent historian: none   Summary of history:   External data reviewed: notes from previous admissions, notes from previous ED visits, and notes from clinic visits  Summary of data reviewed:  Prior notes reviewed in the electronic medical record  Risk and benefits of testing: discussed   Labs: ordered and reviewed  Radiology: ordered and independent interpretation performed (see above or ED course)  ECG/medicine tests: ordered and independent interpretation performed (see above or ED course)  Discussion of management or test interpretation with external provider(s): discussed with hospitalist physician and discussed with Cardiology consultant   Summary of discussion:  As above    Risk  Parenteral controlled substances   Drug therapy requiring intense monitoring for toxicity   Decision regarding hospitalization  Shared decision making     Critical Care  30-74 minutes     Data Reviewed/Counseling: I have personally reviewed the patient's vital signs, nursing notes, and other relevant tests, information, and imaging. I had a detailed discussion regarding the historical points, exam findings, and any diagnostic results supporting the discharge diagnosis. I personally  performed the history, PE, MDM and procedures as documented above and agree with the scribe's documentation.    Portions of this note were dictated using voice recognition software. Although it was reviewed for accuracy, some inherent voice recognition errors may have occurred and may be present in this document.        ED Course as of 07/26/23 9169   Sat Jul 22, 2023   1030 EKG 12-lead  EKG independently interpreted by me.  EKG: SB @ 55, no STEMI, QTc 397 []   1205 X-Ray Chest AP Portable  Independently visualized/reviewed by me during the ED visit.  - No lobar consolidation, no PTX []   1638 Troponin I(!): 0.074  CIS consulted. ASA ordered. [MC]      ED Course User Index  [] Garrison Moore MD                 Clinical Impression:   Final diagnoses:  [R42] Dizziness  [R53.1] Weakness  [R53.1] Generalized weakness        ED Disposition Condition    Admit Stable                Garrison Moore MD  07/26/23 0913

## 2023-07-23 VITALS
TEMPERATURE: 99 F | SYSTOLIC BLOOD PRESSURE: 155 MMHG | HEART RATE: 55 BPM | RESPIRATION RATE: 18 BRPM | WEIGHT: 153 LBS | HEIGHT: 68 IN | OXYGEN SATURATION: 97 % | BODY MASS INDEX: 23.19 KG/M2 | DIASTOLIC BLOOD PRESSURE: 75 MMHG

## 2023-07-23 PROBLEM — R07.9 CHEST PAIN, RULE OUT ACUTE MYOCARDIAL INFARCTION: Status: RESOLVED | Noted: 2022-08-06 | Resolved: 2023-07-23

## 2023-07-23 LAB
ALBUMIN SERPL-MCNC: 3.4 G/DL (ref 3.4–4.8)
ALBUMIN/GLOB SERPL: 1.5 RATIO (ref 1.1–2)
ALP SERPL-CCNC: 119 UNIT/L (ref 40–150)
ALT SERPL-CCNC: 10 UNIT/L (ref 0–55)
AST SERPL-CCNC: 19 UNIT/L (ref 5–34)
BASOPHILS # BLD AUTO: 0.01 X10(3)/MCL
BASOPHILS NFR BLD AUTO: 0.4 %
BILIRUBIN DIRECT+TOT PNL SERPL-MCNC: 0.8 MG/DL
BUN SERPL-MCNC: 15.3 MG/DL (ref 8.4–25.7)
CALCIUM SERPL-MCNC: 8.3 MG/DL (ref 8.8–10)
CHLORIDE SERPL-SCNC: 107 MMOL/L (ref 98–107)
CO2 SERPL-SCNC: 19 MMOL/L (ref 23–31)
CREAT SERPL-MCNC: 1.32 MG/DL (ref 0.73–1.18)
EOSINOPHIL # BLD AUTO: 0.1 X10(3)/MCL (ref 0–0.9)
EOSINOPHIL NFR BLD AUTO: 3.6 %
ERYTHROCYTE [DISTWIDTH] IN BLOOD BY AUTOMATED COUNT: 13.2 % (ref 11.5–17)
GFR SERPLBLD CREATININE-BSD FMLA CKD-EPI: 54 MLS/MIN/1.73/M2
GLOBULIN SER-MCNC: 2.3 GM/DL (ref 2.4–3.5)
GLUCOSE SERPL-MCNC: 69 MG/DL (ref 82–115)
HCT VFR BLD AUTO: 40 % (ref 42–52)
HGB BLD-MCNC: 13 G/DL (ref 14–18)
IMM GRANULOCYTES # BLD AUTO: 0 X10(3)/MCL (ref 0–0.04)
IMM GRANULOCYTES NFR BLD AUTO: 0 %
LYMPHOCYTES # BLD AUTO: 1.13 X10(3)/MCL (ref 0.6–4.6)
LYMPHOCYTES NFR BLD AUTO: 40.9 %
MCH RBC QN AUTO: 28.5 PG (ref 27–31)
MCHC RBC AUTO-ENTMCNC: 32.5 G/DL (ref 33–36)
MCV RBC AUTO: 87.7 FL (ref 80–94)
MONOCYTES # BLD AUTO: 0.33 X10(3)/MCL (ref 0.1–1.3)
MONOCYTES NFR BLD AUTO: 12 %
NEUTROPHILS # BLD AUTO: 1.19 X10(3)/MCL (ref 2.1–9.2)
NEUTROPHILS NFR BLD AUTO: 43.1 %
NRBC BLD AUTO-RTO: 0 %
PLATELET # BLD AUTO: 154 X10(3)/MCL (ref 130–400)
PMV BLD AUTO: 10.1 FL (ref 7.4–10.4)
POTASSIUM SERPL-SCNC: 3.4 MMOL/L (ref 3.5–5.1)
PROT SERPL-MCNC: 5.7 GM/DL (ref 5.8–7.6)
RBC # BLD AUTO: 4.56 X10(6)/MCL (ref 4.7–6.1)
SODIUM SERPL-SCNC: 137 MMOL/L (ref 136–145)
TROPONIN I SERPL-MCNC: 0.03 NG/ML (ref 0–0.04)
TROPONIN I SERPL-MCNC: 0.04 NG/ML (ref 0–0.04)
WBC # SPEC AUTO: 2.76 X10(3)/MCL (ref 4.5–11.5)

## 2023-07-23 PROCEDURE — 84484 ASSAY OF TROPONIN QUANT: CPT | Mod: 91 | Performed by: PHYSICIAN ASSISTANT

## 2023-07-23 PROCEDURE — 25000003 PHARM REV CODE 250: Performed by: NURSE PRACTITIONER

## 2023-07-23 PROCEDURE — 25000003 PHARM REV CODE 250: Performed by: INTERNAL MEDICINE

## 2023-07-23 PROCEDURE — 85025 COMPLETE CBC W/AUTO DIFF WBC: CPT | Performed by: PHYSICIAN ASSISTANT

## 2023-07-23 PROCEDURE — 80053 COMPREHEN METABOLIC PANEL: CPT | Performed by: PHYSICIAN ASSISTANT

## 2023-07-23 RX ORDER — ISOSORBIDE DINITRATE 40 MG/1
40 TABLET ORAL 2 TIMES DAILY
Qty: 60 TABLET | Refills: 0 | Status: SHIPPED | OUTPATIENT
Start: 2023-07-23 | End: 2024-07-22

## 2023-07-23 RX ORDER — ACETAMINOPHEN 325 MG/1
650 TABLET ORAL EVERY 6 HOURS PRN
Status: DISCONTINUED | OUTPATIENT
Start: 2023-07-23 | End: 2023-07-23 | Stop reason: HOSPADM

## 2023-07-23 RX ORDER — METOPROLOL SUCCINATE 25 MG/1
12.5 TABLET, EXTENDED RELEASE ORAL DAILY
Qty: 15 TABLET | Refills: 0 | Status: SHIPPED | OUTPATIENT
Start: 2023-07-24 | End: 2024-07-23

## 2023-07-23 RX ORDER — ISOSORBIDE DINITRATE 20 MG/1
40 TABLET ORAL 2 TIMES DAILY
Status: DISCONTINUED | OUTPATIENT
Start: 2023-07-23 | End: 2023-07-23 | Stop reason: HOSPADM

## 2023-07-23 RX ADMIN — PANTOPRAZOLE SODIUM 40 MG: 40 TABLET, DELAYED RELEASE ORAL at 09:07

## 2023-07-23 RX ADMIN — ACETAMINOPHEN 650 MG: 325 TABLET, FILM COATED ORAL at 03:07

## 2023-07-23 RX ADMIN — ISOSORBIDE DINITRATE 20 MG: 20 TABLET ORAL at 09:07

## 2023-07-23 RX ADMIN — VALSARTAN 320 MG: 80 TABLET, FILM COATED ORAL at 09:07

## 2023-07-23 RX ADMIN — ASPIRIN 81 MG: 81 TABLET, COATED ORAL at 09:07

## 2023-07-23 RX ADMIN — ATORVASTATIN CALCIUM 80 MG: 40 TABLET, FILM COATED ORAL at 09:07

## 2023-07-23 RX ADMIN — METOPROLOL SUCCINATE 12.5 MG: 25 TABLET, EXTENDED RELEASE ORAL at 09:07

## 2023-07-23 NOTE — NURSING
Discharge instructions given and reviewed with pt and answered all questions. No acute distress noted.

## 2023-07-23 NOTE — NURSING
Nurses Note -- 4 Eyes      7/22/2023   9:00 PM      Skin assessed during: Admit      [x] No Altered Skin Integrity Present    [x]Prevention Measures Documented      [] Yes- Altered Skin Integrity Present or Discovered   [] LDA Added if Not in Epic (Describe Wound)   [] New Altered Skin Integrity was Present on Admit and Documented in LDA   [] Wound Image Taken    Wound Care Consulted? No    Attending Nurse:  Alondra Shore RN     Second RN/Staff Member:  Sandeep Mendez RN

## 2023-07-23 NOTE — DISCHARGE SUMMARY
Hospital Medicine  Discharge Summary    Patient Name: Cony Roman  MRN: [id number]  Admit Date: 7/22/2023  Discharge Date:    Status: IP- Inpatient   Length of Stay: 1      PHYSICIANS   Admitting Physician: Almas Mcconnell MD  Discharging Physician: Osmin Culp MD.  Primary Care Physician: Chucky Logan II, MD  Consults: Cardiology      DISCHARGE DIAGNOSES   NSTEMI, type II, ACS ruled out    h/o Coronary artery disease, medically managed  Essential Hypertension, poorly-controlled  AAA status post prior repair  RCC status post partial right nephrectomy  CKD stage IIIA  Tobacco use      PROCEDURES   None      HOSPITAL COURSE    83-year-old male with a history that includes, HTN, CAD, AAA status post repair, renal cancer status post nephrectomy presented to the ED 7/22 for weakness x last 2 days.   He complained of upper abd/chest pain abdominal pain on arrival.  He was afebrile and hemodynamically stable and oxygenating well on room air.  Laboratory work showed a creatinine of 1.46 with an apparent baseline of 1.3.  Troponin was initially undetectable but bumped to 0.074.  CT of the abdomen and pelvis showed narrowing at the origin of the celiac artery but could not comment on the degree and was otherwise unremarkable.  Chest x-ray showed no acute process and CT head showed chronic microvascular ischemic disease.  EKG showed sinus bradycardia without ischemia.  Patient admitted to hospital medicine for further management/work-up.  Cardiology consulted.  Troponin remained flat, EKG was benign.  Evaluated by cardiology, no further cardiac workup required at this time.  Pressures were mildly elevated, but otherwise patient stable without further symptoms.  Can adjust his home antihypertensives, otherwise appropriate for discharge home.      STATUS  Improved    DISPOSITION  Discharge to home    DIET  Cardiac    ACTIVITY  As tolerated      FOLLOW-UP      Chucky Logan II, MD. Schedule an appointment as soon as  possible for a visit in 1 week(s).    Specialty: Family Medicine  Contact information:  [address] [place]  [place] [address]  [phone]               Mynor Cruz MD. Schedule an appointment as soon as possible for a visit in 2 week(s).    Specialty: Cardiology  Contact information:  [address]  [facility] [address]  [phone]                 DISCHARGE MEDICATION RECONCILIATION     CONTINUE with CHANGES     isosorbide dinitrate 40 MG Tab  Commonly known as: ISORDIL  Take 1 tablet (40 mg total) by mouth 2 (two) times daily.     metoprolol succinate 25 MG 24 hr tablet  Commonly known as: TOPROL-XL  Take 0.5 tablets (12.5 mg total) by mouth once daily.            CONTINUE     aspirin 81 MG EC tablet  Commonly known as: ECOTRIN     atorvastatin 80 MG tablet  Commonly known as: LIPITOR     furosemide 20 MG tablet  Commonly known as: LASIX     hydrALAZINE 25 MG tablet  Commonly known as: APRESOLINE  Take 2 tablets (50 mg total) by mouth 3 (three) times daily.     nitroGLYCERIN 0.4 MG SL tablet  Commonly known as: NITROSTAT     pantoprazole 40 MG tablet  Commonly known as: PROTONIX     valsartan 320 MG tablet  Commonly known as: DIOVAN            These medications were sent to [address]  [place] [address]      Phone: [phone]   isosorbide dinitrate 40 MG Tab  metoprolol succinate 25 MG 24 hr tablet         PHYSICAL EXAM   VITALS: T 98.7 °F (37.1 °C)   BP (!) 140/74   P (!) 56   RR 18   O2 98 %    GENERAL: awake and in no acute distress  LUNGS: Respirations non-labored, no peripheral cyanosis  CVS: Normal rate  ABD: Soft, non-tender  EXTREMITIES: Radial pulse 2+  NEURO: AAOx3  PSYCHIATRIC: Cooperative        Discharge time: 33 minutes     Osmin Culp MD  Hospital Medicine       DIAGNOSITCS   CBC:   Recent Labs   Lab 07/22/23  1104 07/23/23  0310   WBC 3.52* 2.76*   HGB 12.8* 13.0*   HCT 41.0* 40.0*   PLT  166 154     CMP:   Recent Labs   Lab 07/22/23  0908 07/22/23  1103 07/23/23  0310   CALCIUM 8.9  --  8.3*   ALBUMIN 3.7  --  3.4     --  137   K 4.6  --  3.4*   CO2 21*  --  19*   BUN 16.2  --  15.3   CREATININE 1.46*  --  1.32*   ALKPHOS 127  --  119   ALT 11  --  10   AST 30  --  19   BILITOT 0.9  --  0.8   MG  --  2.00  --      Estimated Creatinine Clearance: 41 mL/min (A) (based on SCr of 1.32 mg/dL (H)).    CARDIAC ENZYMES:   Recent Labs     07/22/23  2143 07/23/23  0310 07/23/23  0846   TROPONINI 0.039 0.029 0.042          CT Head Without Contrast  Result Date: 7/22/2023  EXAMINATION: CT HEAD WITHOUT CONTRAST CLINICAL HISTORY: weakness; TECHNIQUE: Low dose axial images were obtained through the head.  Coronal and sagittal reformations were also performed. Contrast was not administered. Automatic exposure control was utilized to reduce the patient's radiation dose. DLP= 1115 COMPARISON: 10/13/2022 FINDINGS: No acute intracranial hemorrhage, edema or mass. No acute parenchymal abnormality. Diffuse cerebral atrophy with concordant ventricular enlargement. Scattered hypodensities throughout the deep periventricular white matter. The osseous structures are normal. The mastoid air cells are clear. The auditory canals are patent bilaterally. The globes and orbital contents are normal bilaterally. The visualized maxillary, ethmoid and sphenoid sinuses are clear.   Impression:  No acute intracranial abnormality identified.  Findings of chronic microvascular ischemic disease.   Electronically signed by: Nadir Cheung Date:    07/22/2023 Time:    09:45    CT Abdomen Pelvis With Contrast  Result Date: 7/22/2023  EXAMINATION: CT ABDOMEN PELVIS WITH CONTRAST CLINICAL HISTORY: Abdominal pain, acute, nonlocalized; TECHNIQUE: Low dose axial images, sagittal and coronal reformations were obtained from the lung bases to the pubic symphysis following the IV administration of 100 mL of Isovue 370 no oral contrast is given.  Automatic exposure control (AEC) was utilized for dose reduction. Dose: 272 mGycm COMPARISON: 02/09/2023 FINDINGS: Lung bases appear clear.  There are 2 lesions in the superior right lobe of the liver most consistent with an a hemangiomas.  There is a lesion in the left lobe of the liver consistent with a cyst.  Spleen appears normal.  Pancreas appears normal.  Biliary system appears normal.  The adrenals are not enlarged.  There is a right renal cyst There is aneurysmal dilatation of the abdominal aorta measuring 3.1 cm with mural thrombus.  There is narrowing of the origin of the celiac artery.  The exact degree of stenosis cannot be determined.  There is good flow present in the superior mesenteric artery.  There is a single renal artery bilaterally with good flow present. There is mild prominence of the prostate the appendix appears normal.   Impression:  Aneurysmal dilatation of the aorta stable from the previous study. 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  The exact degree is difficult to determine. 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 Definite acute abnormality is not seen   Electronically signed by: Rohan Maxwell MD Date:    07/22/2023 Time:    14:18    X-Ray Chest AP Portable  Result Date: 7/22/2023  EXAMINATION: XR CHEST AP PORTABLE CLINICAL HISTORY: Weakness TECHNIQUE: Single view of the chest COMPARISON: 08/05/2022 FINDINGS: No focal opacification, pleural effusion, or pneumothorax. The cardiomediastinal silhouette is within normal limits. No acute osseous abnormality.   Impression:  No acute cardiopulmonary process.   Electronically signed by: Nadir Cheung Date:    07/22/2023 Time:    10:33

## 2023-07-23 NOTE — CONSULTS
Inpatient consult to Cardiology  Consult performed by: ANIYAH Riojas  Consult ordered by: Garrison Moore MD    [facility] - 9th Floor Med Surg    Cardiology  Consult Note    Patient Name: Cony Roman  MRN: [id number]  Admission Date: 7/22/2023  Hospital Length of Stay: 1 days  Code Status: Full Code   Attending Provider: Kamala Kay MD   Consulting Provider: ANIYAH Riojas  Primary Care Physician: Chucky Logan II, MD  Principal Problem:Generalized weakness    Patient information was obtained from patient, past medical records, and ER records.     Subjective:     Chief Complaint:      HPI:   Mr. Roman is an 83 year old male, followed by Dr. Cruz, with a past medical history of HTN, HLD, RRC s/p right partial nephrectomy, Angina, Cavernous Hemangioma of Liver, AAA Repair, and Tobacco Use who presented to ED with 2 day hx of weakness, abdominal pain, and dysuria. Workup revealed WBC 3.52, Creatinine 1.46, troponin <0.010->0.074-> 0.039->0.029. UA unremarkable. CT abdomen/pelvis revealed stable aneurysmal dilatation of abdominal aorta measuring 3.1 cm with mural thrombus and narrowing of origin of celiac artery with good flow in SMA. CXR unremarkable. EKG SB, no STEMI. He was treated with IVF and admitted to hospital medicine with consult to [facility] for elevated troponin.  No recent CP, but he does have some chronic CP at times that is generally for a few seconds.  Some CHAEVZ at times, but not a new issue.        PMH: HTN, HLD, Cavernous Hemangioma of Liver, AAA, renal cell cancer  PSH: Right Partial Nephrectomy, AAA Repair  Family History: Father - Cancer; Mother - Cancer  Social History: Current Smoker.  Denies Alcohol Use.  Denies Illicit Drug Use.       Previous Cardiac Diagnostics:   [facility] 11.11.22:  Dominance: right   Left main: patent  Left anterior descending artery: patent  Circumflex artery: patent  Right coronary artery: patent. Sluggish flow in the  RCA.  Hemodynamics:LV/AO= 10 mmHg                      LVEDP= 19 mmHg     Impression:  -Hypertension. -No significant coronary artery disease.  Sluggish flow within the RCA. -Elevated LVEDP    [facility] 11.14.22:  The left ventricle is decreased in size. Global left ventricular systolic function is normal. The left ventricular ejection fraction is 65%. Left ventricular diastolic function is normal. Mild to moderate asymmetric septal left ventricular hypertrophy is present.   Mild to moderate (1-2+) tricuspid regurgitation. Mild to moderate (1-2+) pulmonic regurgitation.   Moderate calcification of the aortic valve is noted. Decreased mobility of the left coronary cusp.  The right and non-coronary cusps demonstrate adequate mobility.  Mild mitral annular calcification is noted.   The pulmonary artery systolic pressure is 28.8 mmHg.      Echocardiogram 10.29.21:  The left ventricular ejection fraction is 56%.  E/A flow reversal noted.  Suggestive of diastolic dysfunction.  Normal right ventricle structure and function.  Aortic valve leaflets are mildly thickened.  Trace mitral regurgitation.  There is mild tricuspid regurgitation with RVSP estimated at 38mmHg.  Moderate pulmonic regurgitation present.     Carotid US 9.17.21:  Less than 50% stenosis of the right internal carotid artery.  Less than 50% stenosis of the left internal carotid artery.  Bilateral antegrade vertebral flow.     PET MPI Stress Test 5.6.21:  This is probably an abnormal perfusion study. There is no evidence of ischemia.   This scan is suggestive of low risk for future cardiovascular events.   Small fixed perfusion abnormality of mild intensity in the apical segment. Small fixed perfusion abnormality of mild intensity in the apical inferior segment.   The left ventricular cavity is noted to be normal on the stress studies. The stress left ventricular ejection fraction was calculated to be 67% and left ventricular global function is normal. The rest  left ventricular cavity is noted to be normal. The rest left ventricular ejection fraction was calculated to be 52% and rest left ventricular global function is normal.   When compared to the resting ejection fraction (52%), the stress ejection fraction (67%) has increased.   The study quality is average.      [facility] 9.25.18:  Left main normal.  LAD normal with sluggish distal flow.  LCx normal with sluggish distal flow.  RCA normal with sluggish distal flow.  LVEDP 3, no gradient on pullback across the valve.          Past Medical History:   Diagnosis Date    HLD (hyperlipidemia)     Hypertension        Past Surgical History:   Procedure Laterality Date    ABDOMINAL SURGERY      LEFT HEART CATHETERIZATION Left 11/11/2022    Procedure: CATHETERIZATION, HEART, LEFT;  Surgeon: Colby Cook MD;  Location: [facility] CATH LAB;  Service: Cardiology;  Laterality: Left;  [facility] +/- PCI    NEPHRECTOMY Right        Review of patient's allergies indicates:  No Known Allergies    Current Facility-Administered Medications on File Prior to Encounter   Medication    0.9%  NaCl infusion    diazePAM tablet 10 mg    diphenhydrAMINE capsule 50 mg    sodium chloride 0.9% flush 10 mL     Current Outpatient Medications on File Prior to Encounter   Medication Sig    aspirin (ECOTRIN) 81 MG EC tablet Take 81 mg by mouth once daily.    atorvastatin (LIPITOR) 80 MG tablet Take 80 mg by mouth.    hydrALAZINE (APRESOLINE) 25 MG tablet Take 2 tablets (50 mg total) by mouth 3 (three) times daily.    isosorbide dinitrate (ISORDIL) 20 MG tablet Take 20 mg by mouth 2 (two) times daily.    metoprolol succinate (TOPROL-XL) 25 MG 24 hr tablet Take 1 tablet (25 mg total) by mouth once daily. (Patient taking differently: Take 12.5 mg by mouth once daily.)    pantoprazole (PROTONIX) 40 MG tablet Take 40 mg by mouth once daily. One po daily    valsartan (DIOVAN) 320 MG tablet Take 320 mg by mouth once daily.    furosemide (LASIX) 20 MG tablet Take 20 mg by mouth  daily as needed.    nitroGLYCERIN (NITROSTAT) 0.4 MG SL tablet Place 0.4 mg under the tongue every 5 (five) minutes as needed for Chest pain.    [DISCONTINUED] cloNIDine (CATAPRES) 0.1 MG tablet Take 1 tablet (0.1 mg total) by mouth every 6 (six) hours as needed (For systolic blood pressure greater than 175).    [DISCONTINUED] lisinopriL (PRINIVIL,ZESTRIL) 20 MG tablet Take 20 mg by mouth once daily.     Family History    None       Tobacco Use    Smoking status: Never    Smokeless tobacco: Never   Substance and Sexual Activity    Alcohol use: Never    Drug use: Never    Sexual activity: Not on file       Review of Systems   Respiratory:  Positive for shortness of breath.    Cardiovascular:  Positive for chest pain.   Gastrointestinal:  Positive for abdominal pain.   Genitourinary:  Positive for dysuria.   Musculoskeletal: Negative.    Neurological:  Positive for weakness.     Objective:     Vital Signs (Most Recent):  Temp: 98.1 °F (36.7 °C) (07/23/23 0313)  Pulse: (!) 55 (07/23/23 0313)  Resp: 18 (07/23/23 0313)  BP: (!) 143/71 (07/23/23 0313)  SpO2: 96 % (07/23/23 0313) Vital Signs (24h Range):  Temp:  [97.7 °F (36.5 °C)-98.6 °F (37 °C)] 98.1 °F (36.7 °C)  Pulse:  [47-60] 55  Resp:  [15-18] 18  SpO2:  [94 %-100 %] 96 %  BP: ()/(61-89) 143/71     Weight: 69.4 kg (153 lb)  Body mass index is 23.26 kg/m².    SpO2: 96 %         Intake/Output Summary (Last 24 hours) at 7/23/2023 0610  Last data filed at 7/23/2023 0246  Gross per 24 hour   Intake 230 ml   Output 275 ml   Net -45 ml       Lines/Drains/Airways       Peripheral Intravenous Line  Duration                  Peripheral IV - Single Lumen 07/22/23 1030 20 G Distal;Left;Posterior Forearm <1 day                    Significant Labs:  Recent Results (from the past 72 hour(s))   Comprehensive metabolic panel    Collection Time: 07/22/23  9:08 AM   Result Value Ref Range    Sodium Level 138 136 - 145 mmol/L    Potassium Level 4.6 3.5 - 5.1 mmol/L    Chloride  106 98 - 107 mmol/L    Carbon Dioxide 21 (L) 23 - 31 mmol/L    Glucose Level 95 82 - 115 mg/dL    Blood Urea Nitrogen 16.2 8.4 - 25.7 mg/dL    Creatinine 1.46 (H) 0.73 - 1.18 mg/dL    Calcium Level Total 8.9 8.8 - 10.0 mg/dL    Protein Total 6.8 5.8 - 7.6 gm/dL    Albumin Level 3.7 3.4 - 4.8 g/dL    Globulin 3.1 2.4 - 3.5 gm/dL    Albumin/Globulin Ratio 1.2 1.1 - 2.0 ratio    Bilirubin Total 0.9 <=1.5 mg/dL    Alkaline Phosphatase 127 40 - 150 unit/L    Alanine Aminotransferase 11 0 - 55 unit/L    Aspartate Aminotransferase 30 5 - 34 unit/L    eGFR 47 mls/min/1.73/m2   Troponin I    Collection Time: 07/22/23  9:08 AM   Result Value Ref Range    Troponin-I <0.010 0.000 - 0.045 ng/mL   COVID/FLU A&B PCR    Collection Time: 07/22/23  9:47 AM   Result Value Ref Range    Influenza A PCR Not Detected Not Detected    Influenza B PCR Not Detected Not Detected    SARS-CoV-2 PCR Not Detected Not Detected, Negative, Invalid   Magnesium    Collection Time: 07/22/23 11:03 AM   Result Value Ref Range    Magnesium Level 2.00 1.60 - 2.60 mg/dL   Brain natriuretic peptide    Collection Time: 07/22/23 11:03 AM   Result Value Ref Range    Natriuretic Peptide 11.0 <=100.0 pg/mL   CBC with Differential    Collection Time: 07/22/23 11:04 AM   Result Value Ref Range    WBC 3.52 (L) 4.50 - 11.50 x10(3)/mcL    RBC 4.44 (L) 4.70 - 6.10 x10(6)/mcL    Hgb 12.8 (L) 14.0 - 18.0 g/dL    Hct 41.0 (L) 42.0 - 52.0 %    MCV 92.3 80.0 - 94.0 fL    MCH 28.8 27.0 - 31.0 pg    MCHC 31.2 (L) 33.0 - 36.0 g/dL    RDW 13.5 11.5 - 17.0 %    Platelet 166 130 - 400 x10(3)/mcL    MPV 10.7 (H) 7.4 - 10.4 fL    Neut % 50.3 %    Lymph % 35.2 %    Mono % 10.5 %    Eos % 3.4 %    Basophil % 0.3 %    Lymph # 1.24 0.6 - 4.6 x10(3)/mcL    Neut # 1.77 (L) 2.1 - 9.2 x10(3)/mcL    Mono # 0.37 0.1 - 1.3 x10(3)/mcL    Eos # 0.12 0 - 0.9 x10(3)/mcL    Baso # 0.01 <=0.2 x10(3)/mcL    IG# 0.01 0 - 0.04 x10(3)/mcL    IG% 0.3 %    NRBC% 0.0 %   Lactic acid, plasma    Collection  Time: 07/22/23 12:17 PM   Result Value Ref Range    Lactic Acid Level 1.8 0.5 - 2.2 mmol/L   Urinalysis, Reflex to Urine Culture    Collection Time: 07/22/23  1:54 PM    Specimen: Urine   Result Value Ref Range    Color, UA Yellow Yellow, Light-Yellow, Dark Yellow, Kiki, Straw    Appearance, UA Clear Clear    Specific Gravity, UA 1.007 1.005 - 1.030    pH, UA 5.5 5.0 - 8.5    Protein, UA Negative Negative    Glucose, UA Negative Negative, Normal    Ketones, UA Negative Negative    Blood, UA Negative Negative    Bilirubin, UA Negative Negative    Urobilinogen, UA 0.2 0.2, 1.0, Normal    Nitrites, UA Negative Negative    Leukocyte Esterase, UA Negative Negative   Urinalysis, Microscopic    Collection Time: 07/22/23  1:54 PM   Result Value Ref Range    RBC, UA None Seen None Seen, 0-2, 3-5, 0-5 /HPF    WBC, UA None Seen None Seen, 0-2, 3-5, 0-5 /HPF    Squamous Epithelial Cells, UA None Seen 0-4, None Seen /HPF    Bacteria, UA None Seen None Seen, Rare, Occasional /HPF   Troponin I    Collection Time: 07/22/23  2:54 PM   Result Value Ref Range    Troponin-I 0.074 (H) 0.000 - 0.045 ng/mL   Troponin I    Collection Time: 07/22/23  9:43 PM   Result Value Ref Range    Troponin-I 0.039 0.000 - 0.045 ng/mL   Troponin I    Collection Time: 07/23/23  3:10 AM   Result Value Ref Range    Troponin-I 0.029 0.000 - 0.045 ng/mL   Comprehensive Metabolic Panel    Collection Time: 07/23/23  3:10 AM   Result Value Ref Range    Sodium Level 137 136 - 145 mmol/L    Potassium Level 3.4 (L) 3.5 - 5.1 mmol/L    Chloride 107 98 - 107 mmol/L    Carbon Dioxide 19 (L) 23 - 31 mmol/L    Glucose Level 69 (L) 82 - 115 mg/dL    Blood Urea Nitrogen 15.3 8.4 - 25.7 mg/dL    Creatinine 1.32 (H) 0.73 - 1.18 mg/dL    Calcium Level Total 8.3 (L) 8.8 - 10.0 mg/dL    Protein Total 5.7 (L) 5.8 - 7.6 gm/dL    Albumin Level 3.4 3.4 - 4.8 g/dL    Globulin 2.3 (L) 2.4 - 3.5 gm/dL    Albumin/Globulin Ratio 1.5 1.1 - 2.0 ratio    Bilirubin Total 0.8 <=1.5 mg/dL     Alkaline Phosphatase 119 40 - 150 unit/L    Alanine Aminotransferase 10 0 - 55 unit/L    Aspartate Aminotransferase 19 5 - 34 unit/L    eGFR 54 mls/min/1.73/m2   CBC with Differential    Collection Time: 07/23/23  3:10 AM   Result Value Ref Range    WBC 2.76 (L) 4.50 - 11.50 x10(3)/mcL    RBC 4.56 (L) 4.70 - 6.10 x10(6)/mcL    Hgb 13.0 (L) 14.0 - 18.0 g/dL    Hct 40.0 (L) 42.0 - 52.0 %    MCV 87.7 80.0 - 94.0 fL    MCH 28.5 27.0 - 31.0 pg    MCHC 32.5 (L) 33.0 - 36.0 g/dL    RDW 13.2 11.5 - 17.0 %    Platelet 154 130 - 400 x10(3)/mcL    MPV 10.1 7.4 - 10.4 fL    Neut % 43.1 %    Lymph % 40.9 %    Mono % 12.0 %    Eos % 3.6 %    Basophil % 0.4 %    Lymph # 1.13 0.6 - 4.6 x10(3)/mcL    Neut # 1.19 (L) 2.1 - 9.2 x10(3)/mcL    Mono # 0.33 0.1 - 1.3 x10(3)/mcL    Eos # 0.10 0 - 0.9 x10(3)/mcL    Baso # 0.01 <=0.2 x10(3)/mcL    IG# 0.00 0 - 0.04 x10(3)/mcL    IG% 0.0 %    NRBC% 0.0 %       Significant Imaging:  Imaging Results              CT Abdomen Pelvis With Contrast (Final result)  Result time 07/22/23 14:18:41      Final result by Rohan Maxwell MD (07/22/23 14:18:41)                   Impression:      Aneurysmal dilatation of the aorta stable from the previous study.    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  The exact degree is difficult to determine.    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    Definite acute abnormality is not seen      Electronically signed by: Rohan Maxwell MD  Date:    07/22/2023  Time:    14:18               Narrative:    EXAMINATION:  CT ABDOMEN PELVIS WITH CONTRAST    CLINICAL HISTORY:  Abdominal pain, acute, nonlocalized;    TECHNIQUE:  Low dose axial images, sagittal and coronal reformations were obtained from the lung bases to the pubic symphysis following the IV administration of 100 mL of Isovue 370 no oral contrast is given.    Automatic exposure control (AEC) was utilized for dose reduction.    Dose: 272 mGycm    COMPARISON:  02/09/2023    FINDINGS:  Lung bases appear clear.  There are  2 lesions in the superior right lobe of the liver most consistent with an a hemangiomas.  There is a lesion in the left lobe of the liver consistent with a cyst.  Spleen appears normal.  Pancreas appears normal.  Biliary system appears normal.  The adrenals are not enlarged.  There is a right renal cyst    There is aneurysmal dilatation of the abdominal aorta measuring 3.1 cm with mural thrombus.  There is narrowing of the origin of the celiac artery.  The exact degree of stenosis cannot be determined.  There is good flow present in the superior mesenteric artery.  There is a single renal artery bilaterally with good flow present.    There is mild prominence of the prostate the appendix appears normal.                                       X-Ray Chest AP Portable (Final result)  Result time 07/22/23 10:33:23      Final result by Nadir Cheung MD (07/22/23 10:33:23)                   Impression:      No acute cardiopulmonary process.      Electronically signed by: Nadir Cheung  Date:    07/22/2023  Time:    10:33               Narrative:    EXAMINATION:  XR CHEST AP PORTABLE    CLINICAL HISTORY:  Weakness    TECHNIQUE:  Single view of the chest    COMPARISON:  08/05/2022    FINDINGS:  No focal opacification, pleural effusion, or pneumothorax.    The cardiomediastinal silhouette is within normal limits.    No acute osseous abnormality.                                       CT Head Without Contrast (Final result)  Result time 07/22/23 09:45:57      Final result by Nadir Cheung MD (07/22/23 09:45:57)                   Impression:      No acute intracranial abnormality identified.  Findings of chronic microvascular ischemic disease.      Electronically signed by: Nadir Cheung  Date:    07/22/2023  Time:    09:45               Narrative:    EXAMINATION:  CT HEAD WITHOUT CONTRAST    CLINICAL HISTORY:  weakness;    TECHNIQUE:  Low dose axial images were obtained through the head.  Coronal and sagittal  reformations were also performed. Contrast was not administered.    Automatic exposure control was utilized to reduce the patient's radiation dose.    DLP= 1115    COMPARISON:  10/13/2022    FINDINGS:  No acute intracranial hemorrhage, edema or mass. No acute parenchymal abnormality.    Diffuse cerebral atrophy with concordant ventricular enlargement.    Scattered hypodensities throughout the deep periventricular white matter.    The osseous structures are normal.    The mastoid air cells are clear.    The auditory canals are patent bilaterally.    The globes and orbital contents are normal bilaterally.    The visualized maxillary, ethmoid and sphenoid sinuses are clear.                                      EKG:          Physical Exam  HENT:      Mouth/Throat:      Mouth: Mucous membranes are moist.   Cardiovascular:      Rate and Rhythm: Regular rhythm. Bradycardia present.      Pulses: Normal pulses.      Heart sounds: Normal heart sounds.   Pulmonary:      Effort: Pulmonary effort is normal.      Breath sounds: Normal breath sounds.   Abdominal:      General: Abdomen is flat.      Palpations: Abdomen is soft.   Musculoskeletal:         General: Normal range of motion.   Skin:     General: Skin is warm.      Capillary Refill: Capillary refill takes less than 2 seconds.   Neurological:      General: No focal deficit present.      Mental Status: He is alert.   Psychiatric:         Mood and Affect: Mood normal.       Home Medications:   Current Facility-Administered Medications on File Prior to Encounter   Medication Dose Route Frequency Provider Last Rate Last Admin    0.9%  NaCl infusion   Intravenous Once Colby Cook MD        diazePAM tablet 10 mg  10 mg Oral On Call Procedure Colby Cook MD   10 mg at 11/11/22 0920    diphenhydrAMINE capsule 50 mg  50 mg Oral On Call Procedure Colby Cook MD   50 mg at 11/11/22 0920    sodium chloride 0.9% flush 10 mL  10 mL Intravenous PRN Colby Cook MD          Current Outpatient Medications on File Prior to Encounter   Medication Sig Dispense Refill    aspirin (ECOTRIN) 81 MG EC tablet Take 81 mg by mouth once daily.      atorvastatin (LIPITOR) 80 MG tablet Take 80 mg by mouth.      hydrALAZINE (APRESOLINE) 25 MG tablet Take 2 tablets (50 mg total) by mouth 3 (three) times daily. 180 tablet 0    isosorbide dinitrate (ISORDIL) 20 MG tablet Take 20 mg by mouth 2 (two) times daily.      metoprolol succinate (TOPROL-XL) 25 MG 24 hr tablet Take 1 tablet (25 mg total) by mouth once daily. (Patient taking differently: Take 12.5 mg by mouth once daily.) 30 tablet 0    pantoprazole (PROTONIX) 40 MG tablet Take 40 mg by mouth once daily. One po daily      valsartan (DIOVAN) 320 MG tablet Take 320 mg by mouth once daily.      furosemide (LASIX) 20 MG tablet Take 20 mg by mouth daily as needed.      nitroGLYCERIN (NITROSTAT) 0.4 MG SL tablet Place 0.4 mg under the tongue every 5 (five) minutes as needed for Chest pain.      [DISCONTINUED] cloNIDine (CATAPRES) 0.1 MG tablet Take 1 tablet (0.1 mg total) by mouth every 6 (six) hours as needed (For systolic blood pressure greater than 175). 30 tablet 0    [DISCONTINUED] lisinopriL (PRINIVIL,ZESTRIL) 20 MG tablet Take 20 mg by mouth once daily.         Current Inpatient Medications:    Current Facility-Administered Medications:     acetaminophen tablet 650 mg, 650 mg, Oral, Q6H PRN, Rosalba Matos, [facility], 650 mg at 07/23/23 0312    aspirin EC tablet 81 mg, 81 mg, Oral, Daily, Michael Peacock MD    atorvastatin tablet 80 mg, 80 mg, Oral, Daily, Michael Peacock MD    enoxaparin injection 40 mg, 40 mg, Subcutaneous, Daily, Michael Peacock MD, 40 mg at 07/22/23 2241    isosorbide dinitrate tablet 20 mg, 20 mg, Oral, BID, Michael Peacock MD    melatonin tablet 6 mg, 6 mg, Oral, Nightly PRN, Michael Peacock MD, 6 mg at 07/22/23 2241    metoprolol succinate (TOPROL-XL) 24 hr split tablet 12.5 mg, 12.5 mg,  Oral, Daily, Michael Peacock MD    pantoprazole EC tablet 40 mg, 40 mg, Oral, Daily, Michael Peacock MD    sodium chloride 0.9% flush 10 mL, 10 mL, Intravenous, PRN, Michael Peacock MD    valsartan tablet 320 mg, 320 mg, Oral, Daily, Michael Peacock MD    Facility-Administered Medications Ordered in Other Encounters:     0.9%  NaCl infusion, , Intravenous, Once, Colby Cook MD    diazePAM tablet 10 mg, 10 mg, Oral, On Call Procedure, Colby Cook MD, 10 mg at 11/11/22 0920    diphenhydrAMINE capsule 50 mg, 50 mg, Oral, On Call Procedure, Colby Cook MD, 50 mg at 11/11/22 0920    sodium chloride 0.9% flush 10 mL, 10 mL, Intravenous, PRN, Colby Cook MD         VTE Risk Mitigation (From admission, onward)           Ordered     enoxaparin injection 40 mg  Daily         07/22/23 2023     IP VTE HIGH RISK PATIENT  Once         07/22/23 2023     Place sequential compression device  Until discontinued         07/22/23 2023                    Assessment:   Elevated troponin of no clinical significance  --[facility] 11.22 reveals patent coronaries with sluggish flow through RCA  Bradycardia  HTN  HLD  Abdominal pain  --per CT abd/pelvis- 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     Iris Mercedes, ANIYAH  Cardiology  [facility] - 9th Floor Med Surg  07/23/2023 6:10 AM     See below MDM component performed by me (Dalton Bhatt MD):    Mild troponin elevation (peak 0.07)    EKG benign and no symptoms that suggest ACS    [facility] ok in 11/22 (other than sluggish flow, maybe some microvascular disease)     He has some nitrates    This does not appear to be any acute issue, maybe some demand ischemia    No more cardiac workup indicated  HTN, mostly uncontrolled here    Room to go up on Nitrates or add Norvasc if needed  Damian, mild and stable    Keep BB at the low dose    Will sign off, please call if changes

## 2023-07-23 NOTE — H&P
[facility] Hospital Medicine History & Physical Examination       Patient Name: Cony Roman  MRN: [id number]  Patient Class: IP- Inpatient   Admission Date: 7/22/2023  8:07 AM  Length of Stay: 0  Admitting Service: Hospital Medicine   Attending Physician: Michael Peacock MD   Primary Care Provider: Chucky Logan II, MD  History source: EMR, patient and/or patient's family    CHIEF COMPLAINT   Weakness and dysuria     HISTORY OF PRESENT ILLNESS:   83-year-old male with a history of CAD, HTN, HLD, AAA status post repair, renal cancer status post nephrectomy presents to the ER for weakness over the last 2 days as well as dysuria.  He activated EMS and they reported he had a blood glucose of 68 and he was given oral glucose EN route.  He complained of abdominal pain on arrival.  He arrived to the ER afebrile and hemodynamically stable maintaining normal sats on room air.  Laboratory work showed a creatinine of 1.46 with an appearance baseline of 1.3.  Troponin was initially undetectable but bumped to 0.074.  CT of the abdomen and pelvis showed narrowing at the origin of the celiac artery but could not comment on the degree and was otherwise unremarkable.  Chest x-ray showed no acute process and CT head showed chronic microvascular ischemic disease.  EKG showed sinus bradycardia without ischemia.  Hospitalist service was consulted for elevated troponin.    PAST MEDICAL HISTORY:   Coronary artery disease  AAA status post repair  RCC status post partial right nephrectomy  CKD stage IIIA  Valvular heart disease  Tobacco abuse  Hypertension  Hyperlipidemia    PAST SURGICAL HISTORY:     Past Surgical History:   Procedure Laterality Date    ABDOMINAL SURGERY      LEFT HEART CATHETERIZATION Left 11/11/2022    Procedure: CATHETERIZATION, HEART, LEFT;  Surgeon: Colby Cook MD;  Location: [facility] CATH LAB;  Service: Cardiology;  Laterality: Left;  LHC +/- PCI    NEPHRECTOMY Right         ALLERGIES:   Patient has no known allergies.    FAMILY HISTORY:   Reviewed and non-contributory     SOCIAL HISTORY:     Social History     Tobacco Use    Smoking status: Never    Smokeless tobacco: Never   Substance Use Topics    Alcohol use: Never        HOME MEDICATIONS:     Prior to Admission medications    Medication Sig Start Date End Date Taking? Authorizing Provider   aspirin (ECOTRIN) 81 MG EC tablet Take 81 mg by mouth once daily.    Historical Provider   atorvastatin (LIPITOR) 80 MG tablet Take 80 mg by mouth. 1/24/23   Historical Provider   furosemide (LASIX) 20 MG tablet Take 20 mg by mouth daily as needed.    Historical Provider   hydrALAZINE (APRESOLINE) 25 MG tablet Take 2 tablets (50 mg total) by mouth 3 (three) times daily. 8/6/22 9/5/22  Jeovany Arroyo MD   isosorbide dinitrate (ISORDIL) 20 MG tablet Take 20 mg by mouth 2 (two) times daily.    Historical Provider   metoprolol succinate (TOPROL-XL) 25 MG 24 hr tablet Take 1 tablet (25 mg total) by mouth once daily.  Patient taking differently: Take 12.5 mg by mouth once daily. 8/6/22 9/5/22  Jeovany Arroyo MD   nitroGLYCERIN (NITROSTAT) 0.4 MG SL tablet Place 0.4 mg under the tongue every 5 (five) minutes as needed for Chest pain.    Historical Provider   valsartan (DIOVAN) 320 MG tablet Take 320 mg by mouth once daily.    Historical Provider   cloNIDine (CATAPRES) 0.1 MG tablet Take 1 tablet (0.1 mg total) by mouth every 6 (six) hours as needed (For systolic blood pressure greater than 175). 6/13/22 8/6/22  Hector Cedillo MD   lisinopriL (PRINIVIL,ZESTRIL) 20 MG tablet Take 20 mg by mouth once daily.  8/6/22  Historical Provider       REVIEW OF SYSTEMS:   Except as documented, all other systems reviewed and negative     PHYSICAL EXAM:   T 98.2 °F (36.8 °C)   BP (!) 140/69   P (!) 56   RR 18   O2 100 %  GENERAL: awake, alert, oriented and in no acute distress, non-toxic appearing   HEENT: normocephalic atraumatic   NECK:  supple   LUNGS: Clear bilaterally, no wheezing or rales, no accessory muscle use   CVS: Regular rate and rhythm, normal peripheral perfusion  ABD: Soft, non-tender, non-distended, bowel sounds present  EXTREMITIES: no clubbing or cyanosis  SKIN: Warm, dry.   NEURO: alert and oriented, grossly without focal deficits   PSYCHIATRIC: Cooperative    LABS AND IMAGING:     Recent Labs     07/22/23  1104   WBC 3.52*   RBC 4.44*   HGB 12.8*   HCT 41.0*   MCV 92.3   MCH 28.8   MCHC 31.2*   RDW 13.5        Recent Labs     07/22/23  1217   LACTIC 1.8     No results for input(s): INR, APTT, D-DIMER in the last 72 hours.  No results for input(s): HGBA1C, CHOL, TRIG, LDL, VLDL, HDL in the last 72 hours.   Recent Labs     07/22/23  0908 07/22/23  1103     --    K 4.6  --    CHLORIDE 106  --    CO2 21*  --    BUN 16.2  --    CREATININE 1.46*  --    GLUCOSE 95  --    CALCIUM 8.9  --    MG  --  2.00   ALBUMIN 3.7  --    GLOBULIN 3.1  --    ALKPHOS 127  --    ALT 11  --    AST 30  --    BILITOT 0.9  --      Recent Labs     07/22/23  0908 07/22/23  1103 07/22/23  1454   BNP  --  11.0  --    TROPONINI <0.010  --  0.074*          CT Abdomen Pelvis With Contrast  Narrative: EXAMINATION:  CT ABDOMEN PELVIS WITH CONTRAST    CLINICAL HISTORY:  Abdominal pain, acute, nonlocalized;    TECHNIQUE:  Low dose axial images, sagittal and coronal reformations were obtained from the lung bases to the pubic symphysis following the IV administration of 100 mL of Isovue 370 no oral contrast is given.    Automatic exposure control (AEC) was utilized for dose reduction.    Dose: 272 mGycm    COMPARISON:  02/09/2023    FINDINGS:  Lung bases appear clear.  There are 2 lesions in the superior right lobe of the liver most consistent with an a hemangiomas.  There is a lesion in the left lobe of the liver consistent with a cyst.  Spleen appears normal.  Pancreas appears normal.  Biliary system appears normal.  The adrenals are not enlarged.  There is  a right renal cyst    There is aneurysmal dilatation of the abdominal aorta measuring 3.1 cm with mural thrombus.  There is narrowing of the origin of the celiac artery.  The exact degree of stenosis cannot be determined.  There is good flow present in the superior mesenteric artery.  There is a single renal artery bilaterally with good flow present.    There is mild prominence of the prostate the appendix appears normal.  Impression: Aneurysmal dilatation of the aorta stable from the previous study.    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  The exact degree is difficult to determine.    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    Definite acute abnormality is not seen    Electronically signed by: Rohan Maxwell MD  Date:    07/22/2023  Time:    14:18  X-Ray Chest AP Portable  Narrative: EXAMINATION:  XR CHEST AP PORTABLE    CLINICAL HISTORY:  Weakness    TECHNIQUE:  Single view of the chest    COMPARISON:  08/05/2022    FINDINGS:  No focal opacification, pleural effusion, or pneumothorax.    The cardiomediastinal silhouette is within normal limits.    No acute osseous abnormality.  Impression: No acute cardiopulmonary process.    Electronically signed by: Nadir Cheung  Date:    07/22/2023  Time:    10:33  CT Head Without Contrast  Narrative: EXAMINATION:  CT HEAD WITHOUT CONTRAST    CLINICAL HISTORY:  weakness;    TECHNIQUE:  Low dose axial images were obtained through the head.  Coronal and sagittal reformations were also performed. Contrast was not administered.    Automatic exposure control was utilized to reduce the patient's radiation dose.    DLP= 1115    COMPARISON:  10/13/2022    FINDINGS:  No acute intracranial hemorrhage, edema or mass. No acute parenchymal abnormality.    Diffuse cerebral atrophy with concordant ventricular enlargement.    Scattered hypodensities throughout the deep periventricular white matter.    The osseous structures are normal.    The mastoid air cells are clear.    The auditory canals are  patent bilaterally.    The globes and orbital contents are normal bilaterally.    The visualized maxillary, ethmoid and sphenoid sinuses are clear.  Impression: No acute intracranial abnormality identified.  Findings of chronic microvascular ischemic disease.    Electronically signed by: Nadir Cheung  Date:    07/22/2023  Time:    09:45      ASSESSMENT & PLAN:   Elevated troponin, rule out ACS (likely demand ischemia)   Borderline hypoglycemia, resolved   Coronary artery disease  AAA status post repair  RCC status post partial right nephrectomy  CKD stage IIIA baseline Cr 1.3  Valvular heart disease  Tobacco abuse  Hypertension  Hyperlipidemia    - tele, trend cardiac enzymes, asa   - cardiology following  - monitor BG  - resume home medications pending med rec    DVT prophylaxis: lovenox  Code status: full     If patient was admitted under observational status it is with my approval/permission.     At least 55 min was spent on this history and physical.  Time seen: 10PM 7/22/23  Michael Peacock MD

## 2023-07-26 ENCOUNTER — PATIENT OUTREACH (OUTPATIENT)
Dept: ADMINISTRATIVE | Facility: CLINIC | Age: 83
End: 2023-07-26
Payer: MEDICARE

## 2023-07-26 NOTE — PROGRESS NOTES
C3 nurse attempted to contact Cony Roman  for a TCC post hospital discharge follow up call. No answer. Left voicemail with callback information. The patient does not have a scheduled HOSFU appointment noted. Unable to route message to PCP staff.

## 2023-07-28 NOTE — PROGRESS NOTES
C3 nurse spoke with patient's wife Herminia for a TCC post hospital discharge follow up call. Stated patient is not home, she was not with him on discharge and he's doing okay. The patient does not have a scheduled HOSFU appointment noted. Unable to route message to PCP staff.

## 2023-07-31 NOTE — PHYSICIAN QUERY
PT Name: Cony Roman  MR #: [id number]     DOCUMENTATION CLARIFICATION      CDS/: Maria Del Rosario Kumar RN               Contact information: [email or web address]  This form is a permanent document in the medical record.    Query Date: July 31, 2023    By submitting this query, we are merely seeking further clarification of documentation to reflect the severity of illness of your patient. Please utilize your independent clinical judgment when addressing the question(s) below.     The Medical Record contains the following:   Indicators   Supporting Clinical Findings Location in Medical Record   x Chest Pain, Angina  Patient denies any chest pain or shortness of breath currently.    Cardiovascular:  Positive for chest pain. ED Prov Note 7/22      Cards Consult 7/23     x Coronary Artery Disease 83-year-old male with a history of CAD, HTN, HLD, AAA status post repair, renal cancer status post nephrectomy presents to the ER for weakness over the last 2 days as well as dysuria.   H&P 7/22   x EKG EKG:  Sinus bradycardia   Confirmed by Eric SYED, Robb ([id number]) on 7/22/2023 12:35:55 PM    EKG 7/22   x Troponin  07/22/23 09:08 07/22/23 14:54 07/22/23 21:43 07/23/23 03:10 07/23/23 08:46   Troponin I <0.010 0.074 (H) 0.039 0.029 0.042    Labs 7/22-7/23            Echo Results        Angiography     x Documentation of acute cardiac condition Elevated troponin, rule out ACS (likely demand ischemia)    Elevated troponin of no clinical significance  --[facility] 11.22 reveals patent coronaries with sluggish flow through RCA    DISCHARGE DIAGNOSES :  NSTEMI, type II, ACS ruled out    H&P 7/22    Cards Consult 7/23      DCS 7/23   x Medication/Treatment Lovenox 40 mf SQ QD  ASA 81 mg PO QD  Atorvastatin 80 mg PO QD  Valsartan 320 mg PO QD   MAR 7/22-7/23  MAR 7/23  MAR 7/23  MAR 7/23    Other        Provider, please clarify the cardiac diagnosis related to the above documentation:    [ x ] NSTEMI/Myocardial Infarction Type 2 due to  (please specify): demand ischemia__________________     [   ] NSTEMI/Myocardial Infarction Type 2 Ruled Out     [   ] Elevated troponin only (without corresponding diagnosis)     [   ] Other Cardiac Diagnosis (please specify): ___________________         Please document in your progress notes daily for the duration of treatment until resolved, and include in your discharge summary.    Form No. [id number]

## 2023-08-07 NOTE — PHYSICIAN QUERY
PT Name: Cony Roman  MR #: [id number]     DOCUMENTATION CLARIFICATION     CDS/: Maria Del Rosario Kumar RN               Contact information: [email or web address]  This form is a permanent document in the medical record.     Query Date: August 7, 2023    By submitting this query, we are merely seeking further clarification of documentation.  Please utilize your independent clinical judgment when addressing the question(s) below.    The Medical Record contains the following     Indicators                           Supporting Clinical Findings Location in Medical Record   x Chest Pain, Angina  Patient denies any chest pain or shortness of breath currently.     Cardiovascular:  Positive for chest pain. ED Prov Note 7/22        Cards Consult 7/23      x Coronary Artery Disease 83-year-old male with a history of CAD, HTN, HLD, AAA status post repair, renal cancer status post nephrectomy presents to the ER for weakness over the last 2 days as well as dysuria.    H&P 7/22   x EKG EKG:  Sinus bradycardia   Confirmed by Eric SYED, Robb ([id number]) on 7/22/2023 12:35:55 PM     EKG 7/22   x Troponin   07/22/23 09:08 07/22/23 14:54 07/22/23 21:43 07/23/23 03:10 07/23/23 08:46   Troponin I <0.010 0.074 (H) 0.039 0.029 0.042    Labs 7/22-7/23                   Echo Results            Angiography       x Documentation of acute cardiac condition Elevated troponin, rule out ACS (likely demand ischemia)     Elevated troponin of no clinical significance  --[facility] 11.22 reveals patent coronaries with sluggish flow through RCA     DISCHARGE DIAGNOSES :  NSTEMI, type II, ACS ruled out     H&P 7/22       Cards Consult 7/23        DCS 7/23   x Medication/Treatment Lovenox 40 mf SQ QD  ASA 81 mg PO QD  Atorvastatin 80 mg PO QD  Valsartan 320 mg PO QD    MAR 7/22-7/23  MAR 7/23  MAR 7/23  MAR 7/23    x Other  [ x ] NSTEMI/Myocardial Infarction Type 2 due to (please specify): demand ischemia__________________     Query response from 7/31/23       (NSTEMI/Myocardial Infarction Type 2 due to demand ischemia) has been confirmed as a diagnosis via query signed (7/31/2023  8:31 PM).      Due to the conflicting clinical picture, please clinically validate the diagnosis of __NSTEMI/Myocardial Infarction Type 2 due to demand ischemia _______________.     Based on your medical judgment and in order to clinically support the documented diagnosis, please document the clinical indicators (signs, symptoms, & treatment) that were utilized to support this diagnosis:    [   ]  Signs, Symptoms, & Treatment: ________________________________________     [   ]  Above stated diagnosis is not confirmed and/or it has been ruled out     [   ]  Above stated diagnosis is not confirmed and/or it has been ruled out, other diagnosis ruled in (please          specify):_______________     [  x ]  Other clarification (please specify): answered proir________________           Form No. [id number]

## 2023-11-09 ENCOUNTER — HOSPITAL ENCOUNTER (EMERGENCY)
Facility: HOSPITAL | Age: 83
Discharge: HOME | End: 2023-11-09
Attending: EMERGENCY MEDICINE
Payer: MEDICARE

## 2023-11-09 VITALS
WEIGHT: 156.5 LBS | TEMPERATURE: 98 F | BODY MASS INDEX: 23.8 KG/M2 | RESPIRATION RATE: 18 BRPM | DIASTOLIC BLOOD PRESSURE: 83 MMHG | SYSTOLIC BLOOD PRESSURE: 120 MMHG | HEART RATE: 53 BPM | OXYGEN SATURATION: 96 %

## 2023-11-09 DIAGNOSIS — N30.00 ACUTE CYSTITIS WITHOUT HEMATURIA: Primary | ICD-10-CM

## 2023-11-09 LAB
ALBUMIN SERPL-MCNC: 4.3 G/DL (ref 3.4–4.8)
ALBUMIN/GLOB SERPL: 1.2 RATIO (ref 1.1–2)
ALP SERPL-CCNC: 150 UNIT/L (ref 40–150)
ALT SERPL-CCNC: 13 UNIT/L (ref 0–55)
APPEARANCE UR: ABNORMAL
AST SERPL-CCNC: 20 UNIT/L (ref 5–34)
BACTERIA #/AREA URNS AUTO: ABNORMAL /HPF
BASOPHILS # BLD AUTO: 0.02 X10(3)/MCL
BASOPHILS NFR BLD AUTO: 0.6 %
BILIRUB SERPL-MCNC: 1.4 MG/DL
BILIRUB UR QL STRIP.AUTO: NEGATIVE
BUN SERPL-MCNC: 14.3 MG/DL (ref 8.4–25.7)
CALCIUM SERPL-MCNC: 9.9 MG/DL (ref 8.8–10)
CHLORIDE SERPL-SCNC: 108 MMOL/L (ref 98–107)
CO2 SERPL-SCNC: 27 MMOL/L (ref 23–31)
COLOR UR AUTO: YELLOW
CREAT SERPL-MCNC: 1.32 MG/DL (ref 0.73–1.18)
EOSINOPHIL # BLD AUTO: 0.13 X10(3)/MCL (ref 0–0.9)
EOSINOPHIL NFR BLD AUTO: 3.6 %
ERYTHROCYTE [DISTWIDTH] IN BLOOD BY AUTOMATED COUNT: 13.2 % (ref 11.5–17)
GFR SERPLBLD CREATININE-BSD FMLA CKD-EPI: 54 MLS/MIN/1.73/M2
GLOBULIN SER-MCNC: 3.5 GM/DL (ref 2.4–3.5)
GLUCOSE SERPL-MCNC: 80 MG/DL (ref 82–115)
GLUCOSE UR QL STRIP.AUTO: NORMAL
HCT VFR BLD AUTO: 50.9 % (ref 42–52)
HGB BLD-MCNC: 15.7 G/DL (ref 14–18)
HOLD SPECIMEN: NORMAL
HYALINE CASTS #/AREA URNS LPF: ABNORMAL /LPF
IMM GRANULOCYTES # BLD AUTO: 0.01 X10(3)/MCL (ref 0–0.04)
IMM GRANULOCYTES NFR BLD AUTO: 0.3 %
KETONES UR QL STRIP.AUTO: NEGATIVE
LEUKOCYTE ESTERASE UR QL STRIP.AUTO: 500
LIPASE SERPL-CCNC: 62 U/L
LYMPHOCYTES # BLD AUTO: 1.19 X10(3)/MCL (ref 0.6–4.6)
LYMPHOCYTES NFR BLD AUTO: 32.9 %
MCH RBC QN AUTO: 27.7 PG (ref 27–31)
MCHC RBC AUTO-ENTMCNC: 30.8 G/DL (ref 33–36)
MCV RBC AUTO: 89.8 FL (ref 80–94)
MONOCYTES # BLD AUTO: 0.29 X10(3)/MCL (ref 0.1–1.3)
MONOCYTES NFR BLD AUTO: 8 %
MUCOUS THREADS URNS QL MICRO: ABNORMAL /LPF
NEUTROPHILS # BLD AUTO: 1.98 X10(3)/MCL (ref 2.1–9.2)
NEUTROPHILS NFR BLD AUTO: 54.6 %
NITRITE UR QL STRIP.AUTO: ABNORMAL
NRBC BLD AUTO-RTO: 0 %
PH UR STRIP.AUTO: 6 [PH]
PLATELET # BLD AUTO: 193 X10(3)/MCL (ref 130–400)
PMV BLD AUTO: 9.9 FL (ref 7.4–10.4)
POTASSIUM SERPL-SCNC: 3.6 MMOL/L (ref 3.5–5.1)
PROT SERPL-MCNC: 7.8 GM/DL (ref 5.8–7.6)
PROT UR QL STRIP.AUTO: ABNORMAL
RBC # BLD AUTO: 5.67 X10(6)/MCL (ref 4.7–6.1)
RBC #/AREA URNS AUTO: ABNORMAL /HPF
RBC UR QL AUTO: NEGATIVE
SODIUM SERPL-SCNC: 145 MMOL/L (ref 136–145)
SP GR UR STRIP.AUTO: 1.02 (ref 1–1.03)
SQUAMOUS #/AREA URNS LPF: ABNORMAL /HPF
UROBILINOGEN UR STRIP-ACNC: NORMAL
WBC # SPEC AUTO: 3.62 X10(3)/MCL (ref 4.5–11.5)
WBC #/AREA URNS AUTO: ABNORMAL /HPF

## 2023-11-09 PROCEDURE — 80053 COMPREHEN METABOLIC PANEL: CPT | Performed by: PHYSICIAN ASSISTANT

## 2023-11-09 PROCEDURE — 85025 COMPLETE CBC W/AUTO DIFF WBC: CPT | Performed by: PHYSICIAN ASSISTANT

## 2023-11-09 PROCEDURE — 99283 EMERGENCY DEPT VISIT LOW MDM: CPT

## 2023-11-09 PROCEDURE — 25000003 PHARM REV CODE 250: Performed by: PHYSICIAN ASSISTANT

## 2023-11-09 PROCEDURE — 83690 ASSAY OF LIPASE: CPT | Performed by: PHYSICIAN ASSISTANT

## 2023-11-09 PROCEDURE — 87077 CULTURE AEROBIC IDENTIFY: CPT | Performed by: PHYSICIAN ASSISTANT

## 2023-11-09 PROCEDURE — 81001 URINALYSIS AUTO W/SCOPE: CPT | Performed by: PHYSICIAN ASSISTANT

## 2023-11-09 RX ORDER — CIPROFLOXACIN 500 MG/1
500 TABLET ORAL 2 TIMES DAILY
Qty: 14 TABLET | Refills: 0 | OUTPATIENT
Start: 2023-11-09 | End: 2023-11-10

## 2023-11-09 RX ORDER — CIPROFLOXACIN 500 MG/1
500 TABLET ORAL
Status: COMPLETED | OUTPATIENT
Start: 2023-11-09 | End: 2023-11-09

## 2023-11-09 RX ORDER — MAG HYDROX/ALUMINUM HYD/SIMETH 200-200-20
30 SUSPENSION, ORAL (FINAL DOSE FORM) ORAL
Status: COMPLETED | OUTPATIENT
Start: 2023-11-09 | End: 2023-11-09

## 2023-11-09 RX ADMIN — ALUMINUM HYDROXIDE, MAGNESIUM HYDROXIDE, AND SIMETHICONE 30 ML: 200; 200; 20 SUSPENSION ORAL at 10:11

## 2023-11-09 RX ADMIN — CIPROFLOXACIN HYDROCHLORIDE 500 MG: 500 TABLET, FILM COATED ORAL at 11:11

## 2023-11-09 NOTE — ED PROVIDER NOTES
Encounter Date: 11/9/2023       History     Chief Complaint   Patient presents with    Abdominal Pain     83 YR W CO LOWER ABD PAIN AND DYSURIA SINCE YESTERDAY.  NO NV OR FEVER REPORTED. VSS.      Cony Roman is a 83 y.o. male with a history of HTN, HLD, CAD, AAA s/p repair, partial right nephrectomy from a renal cell carcinoma who presents tot he ED complaining of lower abdominal pain and dysuria x 2 days. Reports pain is in the suprapubic area and is intermittent. No alleviating or exacerbating factors. Last BM was yesterday and was normal. He denies fevers, chills, chest pain, SOB, N/V/D, hematuria, flank pain.    The history is provided by the patient.     Review of patient's allergies indicates:  No Known Allergies  Past Medical History:   Diagnosis Date    Cancer     HLD (hyperlipidemia)     Hypertension      Past Surgical History:   Procedure Laterality Date    ABDOMINAL SURGERY      LEFT HEART CATHETERIZATION Left 11/11/2022    Procedure: CATHETERIZATION, HEART, LEFT;  Surgeon: Colby Cook MD;  Location: [facility] CATH LAB;  Service: Cardiology;  Laterality: Left;  LHC +/- PCI    NEPHRECTOMY Right      History reviewed. No pertinent family history.  Social History     Tobacco Use    Smoking status: Never    Smokeless tobacco: Never   Substance Use Topics    Alcohol use: Never    Drug use: Never     Review of Systems   Constitutional:  Negative for activity change, chills and fever.   HENT:  Negative for congestion and trouble swallowing.    Eyes:  Negative for photophobia and visual disturbance.   Respiratory:  Negative for chest tightness, shortness of breath and wheezing.    Cardiovascular:  Negative for chest pain, palpitations and leg swelling.   Gastrointestinal:  Positive for abdominal pain. Negative for constipation, diarrhea, nausea and vomiting.   Genitourinary:  Positive for dysuria. Negative for frequency, hematuria and urgency.   Musculoskeletal:  Negative for arthralgias, back pain and gait  problem.   Skin:  Negative for color change and rash.   Neurological:  Negative for dizziness, syncope, weakness, light-headedness, numbness and headaches.   Psychiatric/Behavioral:  Negative for agitation and confusion. The patient is not nervous/anxious.        Physical Exam     Initial Vitals [11/09/23 0929]   BP Pulse Resp Temp SpO2   (!) 158/91 64 18 98.2 °F (36.8 °C) 100 %      MAP       --         Physical Exam    Nursing note and vitals reviewed.  Constitutional: He appears well-developed and well-nourished. No distress.   HENT:   Head: Normocephalic and atraumatic.   Mouth/Throat: No oropharyngeal exudate.   Eyes: EOM are normal. No scleral icterus.   Neck: Neck supple.   Normal range of motion.  Cardiovascular:  Normal rate and regular rhythm.           No murmur heard.  Pulmonary/Chest: No respiratory distress. He has no wheezes.   Abdominal: Abdomen is soft. Bowel sounds are normal. He exhibits no distension. There is abdominal tenderness in the suprapubic area. There is no rebound and no guarding.   Musculoskeletal:         General: No edema. Normal range of motion.      Cervical back: Normal range of motion and neck supple.     Neurological: He is alert and oriented to person, place, and time. No cranial nerve deficit.   Skin: Skin is warm and dry. Capillary refill takes less than 2 seconds. No erythema.   Psychiatric: He has a normal mood and affect. Thought content normal.         ED Course   Procedures  Labs Reviewed   COMPREHENSIVE METABOLIC PANEL - Abnormal; Notable for the following components:       Result Value    Chloride 108 (*)     Glucose Level 80 (*)     Creatinine 1.32 (*)     Protein Total 7.8 (*)     All other components within normal limits   LIPASE - Abnormal; Notable for the following components:    Lipase Level 62 (*)     All other components within normal limits   URINALYSIS, REFLEX TO URINE CULTURE - Abnormal; Notable for the following components:    Appearance, UA Turbid (*)      Protein, UA Trace (*)     Nitrites, UA 2+ (*)     Leukocyte Esterase,  (*)     WBC, UA 51-99 (*)     Bacteria, UA Trace (*)     Mucous, UA Trace (*)     All other components within normal limits   CBC WITH DIFFERENTIAL - Abnormal; Notable for the following components:    WBC 3.62 (*)     MCHC 30.8 (*)     Neut # 1.98 (*)     All other components within normal limits   CULTURE, URINE   CBC W/ AUTO DIFFERENTIAL    Narrative:     The following orders were created for panel order CBC auto differential.  Procedure                               Abnormality         Status                     ---------                               -----------         ------                     CBC with Differential[[id number]]        Abnormal            Final result                 Please view results for these tests on the individual orders.   EXTRA TUBES    Narrative:     The following orders were created for panel order EXTRA TUBES.  Procedure                               Abnormality         Status                     ---------                               -----------         ------                     Light Blue Top Hold[[id number]]                              Final result               Light Green Top Hold[[id number]]                            Final result               Gold Top Hold[[id number]]                                   Final result                 Please view results for these tests on the individual orders.   LIGHT BLUE TOP HOLD   LIGHT GREEN TOP HOLD   GOLD TOP HOLD          Imaging Results    None          Medications   ciprofloxacin HCl tablet 500 mg (has no administration in time range)   aluminum-magnesium hydroxide-simethicone 200-200-20 mg/5 mL suspension 30 mL (30 mLs Oral Given 11/9/23 1026)     Medical Decision Making  Initial assessment: resting comfortably in NAD. HDS and afebrile. Abdomen soft, suprapubic abdominal tenderness without rebound or guarding. No CVA tenderness.     Differential diagnosis:  acute cystitis, pyelonephritis, nephrolithiasis, GERD, among others    Clinical tests/ED management: UA infectious. Pt afebrile without leukocytosis. Cr stable at baseline. No CVA tenderness. Stable for discharge with ciprofloxacin BID x 7 days. Encouraged close follow up with PCP. ED return precautions given for any new or worsening symptoms. He verbalized understanding. All questions answered.     Amount and/or Complexity of Data Reviewed  Labs: ordered. Decision-making details documented in ED Course.    Risk  OTC drugs.  Prescription drug management.               ED Course as of 11/09/23 1119   Thu Nov 09, 2023   1102 WBC, UA(!): 51-99 [KD]   1102 Leukocytes, UA(!): 500 [KD]   1102 NITRITE UA(!): 2+ [KD]      ED Course User Index  [KD] Liza Murillo PA-C                    Clinical Impression:   Final diagnoses:  [N30.00] Acute cystitis without hematuria (Primary)        ED Disposition Condition    Discharge Stable          ED Prescriptions       Medication Sig Dispense Start Date End Date Auth. Provider    ciprofloxacin HCl (CIPRO) 500 MG tablet Take 1 tablet (500 mg total) by mouth 2 (two) times daily. for 7 days 14 tablet 11/9/2023 11/16/2023 Liza Murillo PA-C          Follow-up Information       Follow up With Specialties Details Why Contact Info    [facility] Emergency Medicine  If symptoms worsen [address] [facility] [address] [phone]    Chucky Logan MD Family Medicine In 3 days [facility] [address] [facility] [address]  [phone]               Liza Murillo PA-C  11/09/23 8509

## 2023-11-10 ENCOUNTER — HOSPITAL ENCOUNTER (EMERGENCY)
Facility: HOSPITAL | Age: 83
Discharge: HOME | End: 2023-11-10
Attending: EMERGENCY MEDICINE
Payer: MEDICARE

## 2023-11-10 VITALS
WEIGHT: 156.5 LBS | OXYGEN SATURATION: 98 % | DIASTOLIC BLOOD PRESSURE: 86 MMHG | BODY MASS INDEX: 23.8 KG/M2 | HEART RATE: 63 BPM | SYSTOLIC BLOOD PRESSURE: 144 MMHG | RESPIRATION RATE: 18 BRPM | TEMPERATURE: 98 F

## 2023-11-10 DIAGNOSIS — N30.00 ACUTE CYSTITIS WITHOUT HEMATURIA: ICD-10-CM

## 2023-11-10 DIAGNOSIS — T88.7XXA MEDICATION SIDE EFFECT: Primary | ICD-10-CM

## 2023-11-10 PROCEDURE — 99283 EMERGENCY DEPT VISIT LOW MDM: CPT

## 2023-11-10 RX ORDER — CEFDINIR 300 MG/1
300 CAPSULE ORAL 2 TIMES DAILY
Qty: 14 CAPSULE | Refills: 0 | Status: SHIPPED | OUTPATIENT
Start: 2023-11-10 | End: 2023-11-17

## 2023-11-10 NOTE — ED PROVIDER NOTES
Encounter Date: 11/10/2023       History     Chief Complaint   Patient presents with    Medication Reaction     PT HERE YESTERDAY W DX OF CYSTITIS, REPORTS RX MED MAKES HIM TOO DIZZY. DID NOT TAKE TODAYS AM DOSE. NO DIZZINESS AT PRESENT, ABLE TO DRIVE HERE. VSS.     Dizziness     Patient with pmhx of HTN, HLD, and CAD presents today requesting for prescription change. He was seen in the ED yesterday and was dx with a UTI. He was prescribed ciprofloxacin 500mg. Patient says after he took a dose yesterday it made him feel dizzy. He did not take the medication today because of this. He reports feels well at present time and denies dizziness or any other symptoms. He drove himself to the ED today.     The history is provided by the patient. No  was used.     Review of patient's allergies indicates:  No Known Allergies  Past Medical History:   Diagnosis Date    Cancer     HLD (hyperlipidemia)     Hypertension      Past Surgical History:   Procedure Laterality Date    ABDOMINAL SURGERY      LEFT HEART CATHETERIZATION Left 11/11/2022    Procedure: CATHETERIZATION, HEART, LEFT;  Surgeon: Colby Cook MD;  Location: [facility] CATH LAB;  Service: Cardiology;  Laterality: Left;  LHC +/- PCI    NEPHRECTOMY Right      History reviewed. No pertinent family history.  Social History     Tobacco Use    Smoking status: Never    Smokeless tobacco: Never   Substance Use Topics    Alcohol use: Never    Drug use: Never     Review of Systems   Constitutional:  Negative for chills and fever.   Respiratory:  Negative for chest tightness and shortness of breath.    Cardiovascular:  Negative for chest pain, palpitations and leg swelling.   Gastrointestinal:  Negative for abdominal pain, diarrhea, nausea and vomiting.   Genitourinary:  Negative for flank pain.   Musculoskeletal:  Negative for arthralgias and myalgias.   Skin:  Negative for rash.   Neurological:  Positive for dizziness. Negative for tremors, seizures, syncope,  facial asymmetry, speech difficulty, weakness, light-headedness, numbness and headaches.   All other systems reviewed and are negative.      Physical Exam     Initial Vitals [11/10/23 1028]   BP Pulse Resp Temp SpO2   (!) 144/86 70 18 97.9 °F (36.6 °C) 99 %      MAP       --         Physical Exam    Nursing note and vitals reviewed.  Constitutional: He appears well-developed and well-nourished. He is not diaphoretic. No distress.   HENT:   Head: Normocephalic and atraumatic.   Right Ear: External ear normal.   Left Ear: External ear normal.   Mouth/Throat: Oropharynx is clear and moist. No oropharyngeal exudate.   Eyes: Conjunctivae and EOM are normal.   Neck: Neck supple. No JVD present.   Normal range of motion.  Cardiovascular:  Normal rate, regular rhythm, normal heart sounds and intact distal pulses.           Pulmonary/Chest: Breath sounds normal. No respiratory distress.   Abdominal: Abdomen is soft. Bowel sounds are normal. He exhibits no distension. There is no abdominal tenderness. There is no rebound.   Musculoskeletal:         General: No edema.      Cervical back: Normal range of motion and neck supple.     Neurological: He is alert and oriented to person, place, and time. He has normal strength. No sensory deficit. GCS score is 15. GCS eye subscore is 4. GCS verbal subscore is 5. GCS motor subscore is 6.   Skin: Skin is warm and dry. Capillary refill takes less than 2 seconds. No rash noted.   Psychiatric: He has a normal mood and affect.         ED Course   Procedures  Labs Reviewed - No data to display       Imaging Results    None          Medications - No data to display  Medical Decision Making  Patient presents today with episode of dizziness after taking cipro. He is asymptomatic at present time.    Ddx: medication side effect, hypotension, hypoglycemia, amongst others      Patient is non-toxic appearing. Vitals stable. He denies any complaints or symptoms at present time. Labs from yesterday  reviewed. I will switch his abx from cipro to cefdinir. Patient understands to stop taking cipro and start new medication today. Advised him to f/u with his pcp. He is stable for discharge. ED precautions given.     Amount and/or Complexity of Data Reviewed  External Data Reviewed: labs and notes.     Details: ED visit from yesterday reviewed                               Clinical Impression:   Final diagnoses:  [T88.7XXA] Medication side effect (Primary)  [N30.00] Acute cystitis without hematuria        ED Disposition Condition    Discharge Stable          ED Prescriptions       Medication Sig Dispense Start Date End Date Auth. Provider    cefdinir (OMNICEF) 300 MG capsule Take 1 capsule (300 mg total) by mouth 2 (two) times daily. for 7 days 14 capsule 11/10/2023 11/17/2023 Jody Felix PA          Follow-up Information       Follow up With Specialties Details Why Contact Info    [facility] Emergency Dept Emergency Medicine  If symptoms worsen return to ED immediately [address] [facility] [address] [phone]    Chucky Logan MD Family Medicine In 2 days  [address] [facility] [address]  [phone]               Jody Felix PA  11/10/23 4401

## 2023-11-11 LAB — BACTERIA UR CULT: ABNORMAL

## 2023-12-24 ENCOUNTER — HOSPITAL ENCOUNTER (EMERGENCY)
Facility: HOSPITAL | Age: 83
Discharge: HOME | End: 2023-12-24
Attending: INTERNAL MEDICINE
Payer: MEDICARE

## 2023-12-24 VITALS
DIASTOLIC BLOOD PRESSURE: 76 MMHG | SYSTOLIC BLOOD PRESSURE: 136 MMHG | TEMPERATURE: 97 F | WEIGHT: 163.5 LBS | OXYGEN SATURATION: 97 % | HEART RATE: 53 BPM | BODY MASS INDEX: 24.78 KG/M2 | RESPIRATION RATE: 23 BRPM | HEIGHT: 68 IN

## 2023-12-24 DIAGNOSIS — K59.00 CONSTIPATION, UNSPECIFIED CONSTIPATION TYPE: ICD-10-CM

## 2023-12-24 DIAGNOSIS — N30.90 CYSTITIS: Primary | ICD-10-CM

## 2023-12-24 LAB
ALBUMIN SERPL-MCNC: 3.6 G/DL (ref 3.4–4.8)
ALBUMIN/GLOB SERPL: 1.1 RATIO (ref 1.1–2)
ALP SERPL-CCNC: 121 UNIT/L (ref 40–150)
ALT SERPL-CCNC: 14 UNIT/L (ref 0–55)
APPEARANCE UR: CLEAR
AST SERPL-CCNC: 18 UNIT/L (ref 5–34)
BACTERIA #/AREA URNS AUTO: ABNORMAL /HPF
BASOPHILS # BLD AUTO: 0.01 X10(3)/MCL
BASOPHILS NFR BLD AUTO: 0.3 %
BILIRUB SERPL-MCNC: 1.1 MG/DL
BILIRUB UR QL STRIP.AUTO: NEGATIVE
BUN SERPL-MCNC: 14.2 MG/DL (ref 8.4–25.7)
CALCIUM SERPL-MCNC: 8.8 MG/DL (ref 8.8–10)
CHLORIDE SERPL-SCNC: 110 MMOL/L (ref 98–107)
CO2 SERPL-SCNC: 24 MMOL/L (ref 23–31)
COLOR UR AUTO: ABNORMAL
CREAT SERPL-MCNC: 1.31 MG/DL (ref 0.73–1.18)
EOSINOPHIL # BLD AUTO: 0.16 X10(3)/MCL (ref 0–0.9)
EOSINOPHIL NFR BLD AUTO: 4.1 %
ERYTHROCYTE [DISTWIDTH] IN BLOOD BY AUTOMATED COUNT: 13.4 % (ref 11.5–17)
GFR SERPLBLD CREATININE-BSD FMLA CKD-EPI: 54 MLS/MIN/1.73/M2
GLOBULIN SER-MCNC: 3.2 GM/DL (ref 2.4–3.5)
GLUCOSE SERPL-MCNC: 83 MG/DL (ref 82–115)
GLUCOSE UR QL STRIP.AUTO: NORMAL
HCT VFR BLD AUTO: 44.2 % (ref 42–52)
HGB BLD-MCNC: 13.7 G/DL (ref 14–18)
HOLD SPECIMEN: NORMAL
HYALINE CASTS #/AREA URNS LPF: ABNORMAL /LPF
IMM GRANULOCYTES # BLD AUTO: 0.01 X10(3)/MCL (ref 0–0.04)
IMM GRANULOCYTES NFR BLD AUTO: 0.3 %
KETONES UR QL STRIP.AUTO: NEGATIVE
LEUKOCYTE ESTERASE UR QL STRIP.AUTO: NEGATIVE
LIPASE SERPL-CCNC: 13 U/L
LYMPHOCYTES # BLD AUTO: 1.49 X10(3)/MCL (ref 0.6–4.6)
LYMPHOCYTES NFR BLD AUTO: 38.4 %
MCH RBC QN AUTO: 27.9 PG (ref 27–31)
MCHC RBC AUTO-ENTMCNC: 31 G/DL (ref 33–36)
MCV RBC AUTO: 90 FL (ref 80–94)
MONOCYTES # BLD AUTO: 0.33 X10(3)/MCL (ref 0.1–1.3)
MONOCYTES NFR BLD AUTO: 8.5 %
NEUTROPHILS # BLD AUTO: 1.88 X10(3)/MCL (ref 2.1–9.2)
NEUTROPHILS NFR BLD AUTO: 48.4 %
NITRITE UR QL STRIP.AUTO: NEGATIVE
NRBC BLD AUTO-RTO: 0 %
PH UR STRIP.AUTO: 6.5 [PH]
PLATELET # BLD AUTO: 191 X10(3)/MCL (ref 130–400)
PMV BLD AUTO: 10.2 FL (ref 7.4–10.4)
POTASSIUM SERPL-SCNC: 3.9 MMOL/L (ref 3.5–5.1)
PROT SERPL-MCNC: 6.8 GM/DL (ref 5.8–7.6)
PROT UR QL STRIP.AUTO: NEGATIVE
RBC # BLD AUTO: 4.91 X10(6)/MCL (ref 4.7–6.1)
RBC #/AREA URNS AUTO: ABNORMAL /HPF
RBC UR QL AUTO: NEGATIVE
SODIUM SERPL-SCNC: 143 MMOL/L (ref 136–145)
SP GR UR STRIP.AUTO: 1.01 (ref 1–1.03)
SQUAMOUS #/AREA URNS LPF: ABNORMAL /HPF
UROBILINOGEN UR STRIP-ACNC: NORMAL
WBC # SPEC AUTO: 3.88 X10(3)/MCL (ref 4.5–11.5)
WBC #/AREA URNS AUTO: ABNORMAL /HPF

## 2023-12-24 PROCEDURE — 81001 URINALYSIS AUTO W/SCOPE: CPT | Performed by: PHYSICIAN ASSISTANT

## 2023-12-24 PROCEDURE — 80053 COMPREHEN METABOLIC PANEL: CPT | Performed by: PHYSICIAN ASSISTANT

## 2023-12-24 PROCEDURE — 96372 THER/PROPH/DIAG INJ SC/IM: CPT | Performed by: PHYSICIAN ASSISTANT

## 2023-12-24 PROCEDURE — 99284 EMERGENCY DEPT VISIT MOD MDM: CPT

## 2023-12-24 PROCEDURE — 63600175 PHARM REV CODE 636 W HCPCS: Performed by: PHYSICIAN ASSISTANT

## 2023-12-24 PROCEDURE — 83690 ASSAY OF LIPASE: CPT | Performed by: PHYSICIAN ASSISTANT

## 2023-12-24 PROCEDURE — 85025 COMPLETE CBC W/AUTO DIFF WBC: CPT | Performed by: PHYSICIAN ASSISTANT

## 2023-12-24 RX ORDER — DOCUSATE SODIUM 100 MG/1
100 CAPSULE, LIQUID FILLED ORAL DAILY PRN
Qty: 10 CAPSULE | Refills: 0 | Status: SHIPPED | OUTPATIENT
Start: 2023-12-24

## 2023-12-24 RX ORDER — DICYCLOMINE HYDROCHLORIDE 10 MG/ML
20 INJECTION INTRAMUSCULAR
Status: COMPLETED | OUTPATIENT
Start: 2023-12-24 | End: 2023-12-24

## 2023-12-24 RX ORDER — NITROFURANTOIN 25; 75 MG/1; MG/1
100 CAPSULE ORAL 2 TIMES DAILY
Qty: 10 CAPSULE | Refills: 0 | Status: SHIPPED | OUTPATIENT
Start: 2023-12-24 | End: 2023-12-29

## 2023-12-24 RX ADMIN — DICYCLOMINE HYDROCHLORIDE 20 MG: 20 INJECTION, SOLUTION INTRAMUSCULAR at 10:12

## 2023-12-24 NOTE — ED PROVIDER NOTES
Encounter Date: 12/24/2023       History     Chief Complaint   Patient presents with    Abdominal Pain     C/o abd pain x1 day. Denies n/v/d.      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  Patient reports when pain began yesterday it was intermittent, but is now present in all times.  Denies any identified aggravating or alleviating factors.  Reports associated urinary frequency and intermittent dysuria.  Denies hematuria, testicular pain/swelling, nausea, vomiting, diarrhea, constipation, blood in stool, abdominal distention, anorexia, decreased appetite, unintended weight loss, chest pain, shortness of breath, palpitations, diaphoresis, syncope, orthopnea, edema, pulsatile abdominal mass, back pain, numbness, paresthesia, paralysis, focal weakness, fever, chills.  Vital signs stable on arrival, patient in no acute distress.      Review of patient's allergies indicates:  No Known Allergies  Past Medical History:   Diagnosis Date    Cancer     HLD (hyperlipidemia)     Hypertension      Past Surgical History:   Procedure Laterality Date    ABDOMINAL SURGERY      LEFT HEART CATHETERIZATION Left 11/11/2022    Procedure: CATHETERIZATION, HEART, LEFT;  Surgeon: Colby Cook MD;  Location: [facility] CATH LAB;  Service: Cardiology;  Laterality: Left;  LHC +/- PCI    NEPHRECTOMY Right      No family history on file.  Social History     Tobacco Use    Smoking status: Never    Smokeless tobacco: Never   Substance Use Topics    Alcohol use: Never    Drug use: Never     Review of Systems   All other systems reviewed and are negative.      Physical Exam     Initial Vitals [12/24/23 0913]   BP Pulse Resp Temp SpO2   (!) 167/81 68 18 97 °F (36.1 °C) 99 %      MAP       --         Physical Exam    Nursing note and vitals reviewed.  Constitutional: He  appears well-developed and well-nourished. He is not diaphoretic. No distress.   HENT:   Head: Normocephalic and atraumatic.   Eyes: Conjunctivae and EOM are normal. Pupils are equal, round, and reactive to light. No scleral icterus.   Neck: Neck supple. No JVD present.   Normal range of motion.  Cardiovascular:  Normal rate, regular rhythm, normal heart sounds and intact distal pulses.     Exam reveals no gallop and no friction rub.       No murmur heard.  Pulmonary/Chest: Breath sounds normal. No respiratory distress. He has no wheezes. He has no rhonchi. He has no rales.   Abdominal: Abdomen is soft. Bowel sounds are normal. He exhibits no fluid wave, no abdominal bruit and no pulsatile midline mass. A surgical scar is present. There is abdominal tenderness (mild) in the suprapubic area. No hernia.   No right CVA tenderness.  No left CVA tenderness. There is no rebound, no guarding, no tenderness at McBurney's point and negative Kirk's sign. negative Rovsing's sign  Musculoskeletal:         General: No tenderness or edema. Normal range of motion.      Cervical back: Normal range of motion and neck supple.     Neurological: He is alert and oriented to person, place, and time. He has normal strength. No cranial nerve deficit or sensory deficit. GCS score is 15. GCS eye subscore is 4. GCS verbal subscore is 5. GCS motor subscore is 6.   Skin: Skin is warm and dry. Capillary refill takes less than 2 seconds. No pallor.   Psychiatric: He has a normal mood and affect.         ED Course   Procedures  Labs Reviewed   COMPREHENSIVE METABOLIC PANEL - Abnormal; Notable for the following components:       Result Value    Chloride 110 (*)     Creatinine 1.31 (*)     All other components within normal limits   URINALYSIS, REFLEX TO URINE CULTURE - Abnormal; Notable for the following components:    Bacteria, UA Trace (*)     All other components within normal limits   CBC WITH DIFFERENTIAL - Abnormal; Notable for the  following components:    WBC 3.88 (*)     Hgb 13.7 (*)     MCHC 31.0 (*)     Neut # 1.88 (*)     All other components within normal limits   LIPASE - Normal   CBC W/ AUTO DIFFERENTIAL    Narrative:     The following orders were created for panel order CBC Auto Differential.  Procedure                               Abnormality         Status                     ---------                               -----------         ------                     CBC with Differential[[id number]]       Abnormal            Final result                 Please view results for these tests on the individual orders.   EXTRA TUBES    Narrative:     The following orders were created for panel order EXTRA TUBES.  Procedure                               Abnormality         Status                     ---------                               -----------         ------                     Light Blue Top Hold[[id number]]                             Final result               Gold Top Hold[[id number]]                                   Final result               Pink Top Hold[[id number]]                                   Final result                 Please view results for these tests on the individual orders.   LIGHT BLUE TOP HOLD   GOLD TOP HOLD   PINK TOP HOLD     EKG Readings: (Independently Interpreted)   Initial Reading: No STEMI. Previous EKG: Compared with most recent EKG Previous EKG Date: 7/22/23. Rhythm: Normal Sinus Rhythm. Heart Rate: 64. Ectopy: No Ectopy. Conduction: Normal. ST Segments: Normal ST Segments. Axis: Normal. Clinical Impression: Normal Sinus Rhythm       Imaging Results              X-Ray Abdomen Flat And Erect (Final result)  Result time 12/24/23 11:37:13      Final result by Teddy Maxwell MD (12/24/23 11:37:13)                   Impression:      1. The bowel gas pattern is nonobstructive.  2. Moderate colonic stool present.      Electronically signed by: Teddy Maxwell MD  Date:    12/24/2023  Time:    11:37                Narrative:    EXAMINATION:  XR ABDOMEN FLAT AND ERECT    CLINICAL DATA:  Abdominal pain.    COMPARISON:  None available.    FINDINGS:  No suspiciously dilated loops of gas filled bowel  suggestive of obstruction are seen.  There is no subdiaphragmatic free air.  There is moderate colonic stool.  No suspicious calcifications overlying the expected course of the urinary collecting systems is seen. No acute osseous abnormalities are seen.                                       Medications   dicyclomine injection 20 mg (20 mg Intramuscular Given 12/24/23 1018)     Medical Decision Making  Differential diagnosis: Includes but not limited to UTI, nephrolithiasis, gastroenteritis, colitis, pancreatitis, bowel obstruction, AAA    ED management:  Patient given IM Bentyl, pain improved.      ED course:  X-ray reveals moderate stool burden consistent with mild constipation but is otherwise unremarkable with no evidence of obstruction or abdominal free air.  Patient has benign abdominal exam without rebound, guarding, rigidity or distention.  Patient has no abdominal bruit or pulsatile abdominal mass and has distal pulses intact and symmetric in all 4 extremities, very low suspicion for ruptured/dissected AAA.  CBC reveals mild anemia, overall unremarkable.  CMP reveals creatinine at patient's baseline, overall unremarkable.  Lipase within normal limits, no evidence of acute pancreatitis.  UA shows trace bacteria, but is otherwise unremarkable.  Based off patient's complaints and age I will discharge with short course of antibiotics to treat a UTI.  Patient is nontoxic appearing with unremarkable vitals, stable for discharge.  I will send patient home with medications for symptom relief.  Educated on high-fiber diet and proper hydration.  Instructed to follow up with PCP on Tuesday.  Strict ED precautions given for new or worsening symptoms and patient verbalized understanding.  All test results explained and all questions  answered.    Amount and/or Complexity of Data Reviewed  Labs: ordered. Decision-making details documented in ED Course.  Radiology: ordered and independent interpretation performed. Decision-making details documented in ED Course.                                      Clinical Impression:  Final diagnoses:  [N30.90] Cystitis (Primary)  [K59.00] Constipation, unspecified constipation type          ED Disposition Condition    Discharge Good          ED Prescriptions       Medication Sig Dispense Start Date End Date Auth. Provider    nitrofurantoin, macrocrystal-monohydrate, (MACROBID) 100 MG capsule Take 1 capsule (100 mg total) by mouth 2 (two) times daily. for 5 days 10 capsule 12/24/2023 12/29/2023 Brad Braswell PA    docusate sodium (COLACE) 100 MG capsule Take 1 capsule (100 mg total) by mouth daily as needed for Constipation. 10 capsule 12/24/2023 -- Brad Braswell PA          Follow-up Information       Follow up With Specialties Details Why Contact Info    Chucky Logan MD Family Medicine Call on 12/26/2023  [address] [facility] [id number] [phone]      [facility] Emergency Dept Emergency Medicine  As needed, If symptoms worsen [address] [facility] [address] [phone]             Brad Braswell PA  12/24/23 6083

## 2023-12-24 NOTE — DISCHARGE INSTRUCTIONS
Report to Emergency Department if symptoms return or worsen; [facility] - Medicine Clinic Within 1 to 2 days, It is important that you follow up with your primary care provider or specialist if indicated for further evaluation, workup, and treatment as necessary. The exam and treatment you received in Emergency Department was for an urgent problem and NOT INTENDED AS COMPLETE CARE. It is important that you FOLLOW UP with a doctor for ongoing care. If your symptoms become WORSE or you DO NOT IMPROVE and you are unable to reach your health care provider, you should RETURN to the Emergency Department. The Emergency Department provider has provided a PRELIMINARY INTERPRETATION of all your studies. A final interpretation may be done after you are discharged. If a change in your diagnosis or treatment is needed WE WILL CONTACT YOU. It is critical that we have a CURRENT PHONE NUMBER FOR YOU.

## 2023-12-29 ENCOUNTER — HOSPITAL ENCOUNTER (EMERGENCY)
Facility: HOSPITAL | Age: 83
Discharge: HOME | End: 2023-12-29
Attending: EMERGENCY MEDICINE
Payer: MEDICARE

## 2023-12-29 VITALS
HEIGHT: 68 IN | OXYGEN SATURATION: 98 % | HEIGHT: 68 IN | BODY MASS INDEX: 23.49 KG/M2 | HEART RATE: 84 BPM | TEMPERATURE: 98 F | DIASTOLIC BLOOD PRESSURE: 78 MMHG | BODY MASS INDEX: 23.49 KG/M2 | WEIGHT: 155 LBS | SYSTOLIC BLOOD PRESSURE: 160 MMHG | WEIGHT: 155 LBS | RESPIRATION RATE: 16 BRPM | DIASTOLIC BLOOD PRESSURE: 78 MMHG | SYSTOLIC BLOOD PRESSURE: 160 MMHG | TEMPERATURE: 98 F | RESPIRATION RATE: 16 BRPM | HEART RATE: 84 BPM | OXYGEN SATURATION: 98 %

## 2023-12-29 DIAGNOSIS — R07.89 ATYPICAL CHEST PAIN: Primary | ICD-10-CM

## 2023-12-29 DIAGNOSIS — R07.9 CHEST PAIN: ICD-10-CM

## 2023-12-29 DIAGNOSIS — K21.9 GASTROESOPHAGEAL REFLUX DISEASE, UNSPECIFIED WHETHER ESOPHAGITIS PRESENT: ICD-10-CM

## 2023-12-29 LAB
ANION GAP SERPL CALC-SCNC: 11 MEQ/L
BASOPHILS # BLD AUTO: 0.03 X10(3)/MCL
BASOPHILS NFR BLD AUTO: 0.7 %
BNP BLD-MCNC: 21.1 PG/ML
BUN SERPL-MCNC: 14 MG/DL (ref 8.4–25.7)
CALCIUM SERPL-MCNC: 8.6 MG/DL (ref 8.8–10)
CHLORIDE SERPL-SCNC: 111 MMOL/L (ref 98–107)
CO2 SERPL-SCNC: 22 MMOL/L (ref 23–31)
CREAT SERPL-MCNC: 1.32 MG/DL (ref 0.73–1.18)
CREAT/UREA NIT SERPL: 11
EOSINOPHIL # BLD AUTO: 0.24 X10(3)/MCL (ref 0–0.9)
EOSINOPHIL NFR BLD AUTO: 6 %
ERYTHROCYTE [DISTWIDTH] IN BLOOD BY AUTOMATED COUNT: 13.4 % (ref 11.5–17)
GFR SERPLBLD CREATININE-BSD FMLA CKD-EPI: 54 MLS/MIN/1.73/M2
GLUCOSE SERPL-MCNC: 71 MG/DL (ref 82–115)
HCT VFR BLD AUTO: 41.7 % (ref 42–52)
HGB BLD-MCNC: 12.9 G/DL (ref 14–18)
IMM GRANULOCYTES # BLD AUTO: 0.01 X10(3)/MCL (ref 0–0.04)
IMM GRANULOCYTES NFR BLD AUTO: 0.2 %
LYMPHOCYTES # BLD AUTO: 1.58 X10(3)/MCL (ref 0.6–4.6)
LYMPHOCYTES NFR BLD AUTO: 39.4 %
MAGNESIUM SERPL-MCNC: 1.9 MG/DL (ref 1.6–2.6)
MCH RBC QN AUTO: 27.9 PG (ref 27–31)
MCHC RBC AUTO-ENTMCNC: 30.9 G/DL (ref 33–36)
MCV RBC AUTO: 90.3 FL (ref 80–94)
MONOCYTES # BLD AUTO: 0.4 X10(3)/MCL (ref 0.1–1.3)
MONOCYTES NFR BLD AUTO: 10 %
NEUTROPHILS # BLD AUTO: 1.75 X10(3)/MCL (ref 2.1–9.2)
NEUTROPHILS NFR BLD AUTO: 43.7 %
NRBC BLD AUTO-RTO: 0 %
PLATELET # BLD AUTO: 224 X10(3)/MCL (ref 130–400)
PMV BLD AUTO: 10.3 FL (ref 7.4–10.4)
POTASSIUM SERPL-SCNC: 3.5 MMOL/L (ref 3.5–5.1)
RBC # BLD AUTO: 4.62 X10(6)/MCL (ref 4.7–6.1)
SODIUM SERPL-SCNC: 144 MMOL/L (ref 136–145)
TROPONIN I SERPL-MCNC: 0.02 NG/ML (ref 0–0.04)
TROPONIN I SERPL-MCNC: 0.03 NG/ML (ref 0–0.04)
WBC # SPEC AUTO: 4.01 X10(3)/MCL (ref 4.5–11.5)

## 2023-12-29 PROCEDURE — 83735 ASSAY OF MAGNESIUM: CPT | Performed by: EMERGENCY MEDICINE

## 2023-12-29 PROCEDURE — 85025 COMPLETE CBC W/AUTO DIFF WBC: CPT | Performed by: EMERGENCY MEDICINE

## 2023-12-29 PROCEDURE — 84484 ASSAY OF TROPONIN QUANT: CPT | Performed by: EMERGENCY MEDICINE

## 2023-12-29 PROCEDURE — 99285 EMERGENCY DEPT VISIT HI MDM: CPT | Mod: 25

## 2023-12-29 PROCEDURE — 80048 BASIC METABOLIC PNL TOTAL CA: CPT | Performed by: EMERGENCY MEDICINE

## 2023-12-29 PROCEDURE — 25000003 PHARM REV CODE 250: Performed by: EMERGENCY MEDICINE

## 2023-12-29 PROCEDURE — 83880 ASSAY OF NATRIURETIC PEPTIDE: CPT | Performed by: EMERGENCY MEDICINE

## 2023-12-29 PROCEDURE — 93005 ELECTROCARDIOGRAM TRACING: CPT

## 2023-12-29 RX ORDER — MAG HYDROX/ALUMINUM HYD/SIMETH 200-200-20
30 SUSPENSION, ORAL (FINAL DOSE FORM) ORAL
Status: COMPLETED | OUTPATIENT
Start: 2023-12-29 | End: 2023-12-29

## 2023-12-29 RX ORDER — OMEPRAZOLE 20 MG/1
20 CAPSULE, DELAYED RELEASE ORAL DAILY
Qty: 14 CAPSULE | Refills: 0 | Status: SHIPPED | OUTPATIENT
Start: 2023-12-29 | End: 2024-01-12

## 2023-12-29 RX ADMIN — ALUMINUM HYDROXIDE, MAGNESIUM HYDROXIDE, AND SIMETHICONE 30 ML: 200; 200; 20 SUSPENSION ORAL at 04:12

## 2023-12-29 NOTE — ED PROVIDER NOTES
ED PROVIDER NOTE  12/29/2023    CHIEF COMPLAINT:   Chief Complaint   Patient presents with    Chest Pain     Pt c/o intermittent chest pain since 1900 yesterday.  Pt aaox4.  Reports cp much improved currently.  Nsr on monitor.        HISTORY OF PRESENT ILLNESS:   Cony Roman is a 83 y.o. male who presents with chief complaint Chest pain.  Onset was last night around 7:00 p.m. whenever he began having intermittent substernal chest pain characterized as tightness that he states is worse with lying down but improves with sitting up.  Associated symptoms of epigastric abdominal pain and shortness of breath.  Denies nausea, vomiting, cough, hemoptysis, fever.    The history is provided by the patient.         REVIEW OF SYSTEMS: as noted in the HPI.  NURSING NOTES REVIEWED      PAST MEDICAL/SURGICAL HISTORY:   Past Medical History:   Diagnosis Date    Cancer     HLD (hyperlipidemia)     Hypertension       Past Surgical History:   Procedure Laterality Date    ABDOMINAL SURGERY      LEFT HEART CATHETERIZATION Left 11/11/2022    Procedure: CATHETERIZATION, HEART, LEFT;  Surgeon: Colby Cook MD;  Location: [facility] CATH LAB;  Service: Cardiology;  Laterality: Left;  LHC +/- PCI    NEPHRECTOMY Right        FAMILY HISTORY: No family history on file.    SOCIAL HISTORY:   Social History     Tobacco Use    Smoking status: Never    Smokeless tobacco: Never   Substance Use Topics    Alcohol use: Never    Drug use: Never       ALLERGIES: Review of patient's allergies indicates:  No Known Allergies    PHYSICAL EXAM:  Initial Vitals   BP Pulse Resp Temp SpO2   12/29/23 0403 12/29/23 0358 12/29/23 0358 12/29/23 0358 12/29/23 0358   (!) 163/98 61 16 98.2 °F (36.8 °C) 96 %      MAP       --                Physical Exam    Nursing note and vitals reviewed.  Constitutional: He appears well-developed and well-nourished. No distress.   HENT:   Head: Normocephalic and atraumatic.   Nose: Nose normal.   Mouth/Throat: Oropharynx is clear and  moist and mucous membranes are normal.   Eyes: Conjunctivae and EOM are normal. Pupils are equal, round, and reactive to light.   Neck: Neck supple. No tracheal deviation present.   Cardiovascular:  Normal rate, regular rhythm, normal heart sounds, intact distal pulses and normal pulses.           Pulmonary/Chest: Effort normal and breath sounds normal. No respiratory distress.   Abdominal: Abdomen is soft. There is no abdominal tenderness. There is no rebound and no guarding.   Musculoskeletal:         General: Normal range of motion.      Cervical back: Neck supple.     Neurological: He is alert and oriented to person, place, and time. GCS eye subscore is 4. GCS verbal subscore is 5. GCS motor subscore is 6.   CN II-XII intact. Moves all extremities. No gross sensory or motor deficits.   Skin: Skin is warm, dry and intact.   Psychiatric: He has a normal mood and affect. His speech is normal and behavior is normal. Judgment and thought content normal. Cognition and memory are normal.         RESULTS:  Labs Reviewed   BASIC METABOLIC PANEL - Abnormal; Notable for the following components:       Result Value    Chloride 111 (*)     Carbon Dioxide 22 (*)     Glucose Level 71 (*)     Creatinine 1.32 (*)     Calcium Level Total 8.6 (*)     All other components within normal limits   CBC WITH DIFFERENTIAL - Abnormal; Notable for the following components:    WBC 4.01 (*)     RBC 4.62 (*)     Hgb 12.9 (*)     Hct 41.7 (*)     MCHC 30.9 (*)     Neut # 1.75 (*)     All other components within normal limits   B-TYPE NATRIURETIC PEPTIDE - Normal   MAGNESIUM - Normal   TROPONIN I - Normal   TROPONIN I - Normal   CBC W/ AUTO DIFFERENTIAL    Narrative:     The following orders were created for panel order CBC auto differential.  Procedure                               Abnormality         Status                     ---------                               -----------         ------                     CBC with  Differential[[id number]]       Abnormal            Final result                 Please view results for these tests on the individual orders.     Imaging Results              X-Ray Chest AP Portable (Final result)  Result time 12/29/23 06:25:10      Final result by Summer Anderson MD (12/29/23 06:25:10)                   Impression:      Chronic prominence of the aortic knob, similar to priors taking into account patient rotation.      Electronically signed by: Summer Anderson  Date:    12/29/2023  Time:    06:25               Narrative:    EXAMINATION:  XR CHEST AP PORTABLE    CLINICAL HISTORY:  chest pain;    TECHNIQUE:  Single frontal view of the chest was performed.    COMPARISON:  07/22/2023, 08/05/2022, 02/05/2022, 12/03/2021    FINDINGS:  LINES AND TUBES: EKG/telemetry leads overlie the chest.    MEDIASTINUM AND RICKEY: Cardiac silhouette is not enlarged.  Prominence of the aortic knob, priors taking into account patient rotation.    LUNGS: No lobar consolidation. No edema.    PLEURA:No pleural effusion. No pneumothorax.    BONES: No acute osseous abnormality.                        Wet Read by Saad Craig DO (12/29/23 04:09:00, [facility], Emergency Medicine)    No dense lobar consolidation or pneumothorax.                                    PROCEDURES:  Procedures    ECG:  EKG Readings: (Independently Interpreted)   Initial Reading: No STEMI. Rhythm: Normal Sinus Rhythm. Heart Rate: 63. Ectopy: No Ectopy. Conduction: Normal. Axis: Normal.       ED COURSE AND MEDICAL DECISION MAKING:  Medications   aluminum-magnesium hydroxide-simethicone 200-200-20 mg/5 mL suspension 30 mL (30 mLs Oral Given 12/29/23 0405)     ED Course as of 12/29/23 0755   Fri Dec 29, 2023   0436 Reports chest pain has essentially resolved after Maalox. [IB]   0516 WBC(!): 4.01  Chronic, improved from previous labs. [IB]   0516 Platelet Count: 224 [IB]   0554 BNP: 21.1 [IB]   0621 Troponin I: 0.022 [IB]    0621 Magnesium : 1.90 [IB]   0621 Creatinine(!): 1.32  Baseline compared to previous labs. [IB]      ED Course User Index  [IB] Saad Craig DO        Medical Decision Making  Amount and/or Complexity of Data Reviewed  External Data Reviewed: notes.     Details: Coronary findings:     Dominance: right   Left main: patent  Left anterior descending artery: patent  Circumflex artery: patent  Right coronary artery: patent. Sluggish flow in the RCA.     Hemodynamics:LV/AO= 10 mmHg                      LVEDP= 19 mmHg       Access Closure :    At the end of the procedure the sheath was removed. A TR band applied to the right radial artery . Excellent hemostasis achieved.        Impression:  -Hypertension  -No significant coronary artery disease.  Sluggish flow within the RCA.  -Elevated LVEDP     Plan  -Aggressive blood pressure control and/or diuretics for elevated LVEDP  -Maximize anti-anginal hector for probable underlying microvascular disease with sluggish flow in the RCA     Date: 11/11/2022  Time: 11:55 AM       Labs: ordered. Decision-making details documented in ED Course.  Radiology: ordered and independent interpretation performed.    Risk  OTC drugs.        CLINICAL IMPRESSION:  1. Atypical chest pain    2. Chest pain    3. Gastroesophageal reflux disease, unspecified whether esophagitis present        DISPOSITION:   ED Disposition Condition    Discharge Stable            ED Prescriptions       Medication Sig Dispense Start Date End Date Auth. Provider    omeprazole (PRILOSEC) 20 MG capsule Take 1 capsule (20 mg total) by mouth once daily. for 14 days 14 capsule 12/29/2023 1/12/2024 Elvin Prado MD          Follow-up Information       Follow up With Specialties Details Why Contact Info    Chucky Logan MD Family Medicine In 1 week  [address] [facility] [address]  [phone]      [facility] Emergency Medicine  If symptoms worsen [address]  [facility] [address]  [phone]               Elvin Prado MD  12/29/23 0836

## 2024-01-03 ENCOUNTER — HOSPITAL ENCOUNTER (EMERGENCY)
Facility: HOSPITAL | Age: 84
Discharge: HOME | End: 2024-01-03
Attending: FAMILY MEDICINE
Payer: MEDICARE

## 2024-01-03 VITALS
HEART RATE: 67 BPM | BODY MASS INDEX: 23.79 KG/M2 | DIASTOLIC BLOOD PRESSURE: 80 MMHG | SYSTOLIC BLOOD PRESSURE: 139 MMHG | TEMPERATURE: 98 F | BODY MASS INDEX: 23.79 KG/M2 | RESPIRATION RATE: 15 BRPM | OXYGEN SATURATION: 98 % | SYSTOLIC BLOOD PRESSURE: 139 MMHG | HEIGHT: 68 IN | DIASTOLIC BLOOD PRESSURE: 80 MMHG | WEIGHT: 157 LBS | WEIGHT: 157 LBS | HEART RATE: 67 BPM | OXYGEN SATURATION: 98 % | TEMPERATURE: 98 F | HEIGHT: 68 IN | RESPIRATION RATE: 15 BRPM

## 2024-01-03 DIAGNOSIS — K59.00 CONSTIPATION, UNSPECIFIED CONSTIPATION TYPE: Primary | ICD-10-CM

## 2024-01-03 DIAGNOSIS — R10.9 ABDOMINAL PAIN: ICD-10-CM

## 2024-01-03 DIAGNOSIS — R10.32 ABDOMINAL PAIN, LEFT LOWER QUADRANT: ICD-10-CM

## 2024-01-03 LAB
ALBUMIN SERPL-MCNC: 3.7 G/DL (ref 3.4–4.8)
ALBUMIN/GLOB SERPL: 1.3 RATIO (ref 1.1–2)
ALP SERPL-CCNC: 125 UNIT/L (ref 40–150)
ALT SERPL-CCNC: 12 UNIT/L (ref 0–55)
APPEARANCE UR: CLEAR
AST SERPL-CCNC: 19 UNIT/L (ref 5–34)
BACTERIA #/AREA URNS AUTO: ABNORMAL /HPF
BASOPHILS # BLD AUTO: 0.01 X10(3)/MCL
BASOPHILS NFR BLD AUTO: 0.3 %
BILIRUB SERPL-MCNC: 0.7 MG/DL
BILIRUB UR QL STRIP.AUTO: NEGATIVE
BUN SERPL-MCNC: 15.6 MG/DL (ref 8.4–25.7)
CALCIUM SERPL-MCNC: 8.7 MG/DL (ref 8.8–10)
CHLORIDE SERPL-SCNC: 111 MMOL/L (ref 98–107)
CO2 SERPL-SCNC: 24 MMOL/L (ref 23–31)
COLOR UR AUTO: YELLOW
CREAT SERPL-MCNC: 1.38 MG/DL (ref 0.73–1.18)
EOSINOPHIL # BLD AUTO: 0.19 X10(3)/MCL (ref 0–0.9)
EOSINOPHIL NFR BLD AUTO: 5.7 %
ERYTHROCYTE [DISTWIDTH] IN BLOOD BY AUTOMATED COUNT: 13.4 % (ref 11.5–17)
GFR SERPLBLD CREATININE-BSD FMLA CKD-EPI: 51 MLS/MIN/1.73/M2
GLOBULIN SER-MCNC: 2.8 GM/DL (ref 2.4–3.5)
GLUCOSE SERPL-MCNC: 78 MG/DL (ref 82–115)
GLUCOSE UR QL STRIP.AUTO: NORMAL
HCT VFR BLD AUTO: 40.5 % (ref 42–52)
HGB BLD-MCNC: 12.4 G/DL (ref 14–18)
HYALINE CASTS #/AREA URNS LPF: ABNORMAL /LPF
IMM GRANULOCYTES # BLD AUTO: 0.01 X10(3)/MCL (ref 0–0.04)
IMM GRANULOCYTES NFR BLD AUTO: 0.3 %
KETONES UR QL STRIP.AUTO: NEGATIVE
LEUKOCYTE ESTERASE UR QL STRIP.AUTO: NEGATIVE
LIPASE SERPL-CCNC: 13 U/L
LYMPHOCYTES # BLD AUTO: 1.45 X10(3)/MCL (ref 0.6–4.6)
LYMPHOCYTES NFR BLD AUTO: 43.3 %
MCH RBC QN AUTO: 27.9 PG (ref 27–31)
MCHC RBC AUTO-ENTMCNC: 30.6 G/DL (ref 33–36)
MCV RBC AUTO: 91 FL (ref 80–94)
MONOCYTES # BLD AUTO: 0.3 X10(3)/MCL (ref 0.1–1.3)
MONOCYTES NFR BLD AUTO: 9 %
MUCOUS THREADS URNS QL MICRO: ABNORMAL /LPF
NEUTROPHILS # BLD AUTO: 1.39 X10(3)/MCL (ref 2.1–9.2)
NEUTROPHILS NFR BLD AUTO: 41.4 %
NITRITE UR QL STRIP.AUTO: NEGATIVE
NRBC BLD AUTO-RTO: 0 %
PH UR STRIP.AUTO: 6 [PH]
PLATELET # BLD AUTO: 211 X10(3)/MCL (ref 130–400)
PMV BLD AUTO: 10.3 FL (ref 7.4–10.4)
POTASSIUM SERPL-SCNC: 3.7 MMOL/L (ref 3.5–5.1)
PROT SERPL-MCNC: 6.5 GM/DL (ref 5.8–7.6)
PROT UR QL STRIP.AUTO: ABNORMAL
RBC # BLD AUTO: 4.45 X10(6)/MCL (ref 4.7–6.1)
RBC #/AREA URNS AUTO: ABNORMAL /HPF
RBC UR QL AUTO: NEGATIVE
SODIUM SERPL-SCNC: 143 MMOL/L (ref 136–145)
SP GR UR STRIP.AUTO: 1.03 (ref 1–1.03)
SQUAMOUS #/AREA URNS LPF: ABNORMAL /HPF
UROBILINOGEN UR STRIP-ACNC: NORMAL
WBC # SPEC AUTO: 3.35 X10(3)/MCL (ref 4.5–11.5)
WBC #/AREA URNS AUTO: ABNORMAL /HPF

## 2024-01-03 PROCEDURE — 83690 ASSAY OF LIPASE: CPT | Performed by: FAMILY MEDICINE

## 2024-01-03 PROCEDURE — 99284 EMERGENCY DEPT VISIT MOD MDM: CPT

## 2024-01-03 PROCEDURE — 96372 THER/PROPH/DIAG INJ SC/IM: CPT | Performed by: FAMILY MEDICINE

## 2024-01-03 PROCEDURE — 80053 COMPREHEN METABOLIC PANEL: CPT | Performed by: FAMILY MEDICINE

## 2024-01-03 PROCEDURE — 81001 URINALYSIS AUTO W/SCOPE: CPT | Performed by: FAMILY MEDICINE

## 2024-01-03 PROCEDURE — 63600175 PHARM REV CODE 636 W HCPCS: Performed by: FAMILY MEDICINE

## 2024-01-03 PROCEDURE — 25000003 PHARM REV CODE 250: Performed by: FAMILY MEDICINE

## 2024-01-03 PROCEDURE — 85025 COMPLETE CBC W/AUTO DIFF WBC: CPT | Performed by: FAMILY MEDICINE

## 2024-01-03 RX ORDER — DICYCLOMINE HYDROCHLORIDE 10 MG/ML
10 INJECTION INTRAMUSCULAR
Status: COMPLETED | OUTPATIENT
Start: 2024-01-03 | End: 2024-01-03

## 2024-01-03 RX ORDER — AMOXICILLIN 250 MG
2 CAPSULE ORAL NIGHTLY PRN
Qty: 28 TABLET | Refills: 0 | Status: SHIPPED | OUTPATIENT
Start: 2024-01-03 | End: 2024-01-17

## 2024-01-03 RX ORDER — MAG HYDROX/ALUMINUM HYD/SIMETH 200-200-20
30 SUSPENSION, ORAL (FINAL DOSE FORM) ORAL
Status: COMPLETED | OUTPATIENT
Start: 2024-01-03 | End: 2024-01-03

## 2024-01-03 RX ADMIN — DICYCLOMINE HYDROCHLORIDE 10 MG: 20 INJECTION, SOLUTION INTRAMUSCULAR at 04:01

## 2024-01-03 RX ADMIN — ALUMINUM HYDROXIDE, MAGNESIUM HYDROXIDE, AND SIMETHICONE 30 ML: 200; 200; 20 SUSPENSION ORAL at 04:01

## 2024-01-03 NOTE — ED PROVIDER NOTES
Encounter Date: 1/3/2024       History     Chief Complaint   Patient presents with    Abdominal Pain     Pt presents to ed with reports of midline lower abd pain x2 days. Pt states was seen in ED for same c/o last week. Pt states started back yesterday. Pt denies n/v/d, constipation, dysuria. Pt also reports normal BM.      Patient is a 83-year-old gentleman presents emergency room complaints of intermittent abdominal cramping.  Patient reports he was seen in the emergency room recently with similar complaints.  Reports pain had improved, but then worsened again yesterday.  Patient reports intermittent episodes of cramping periumbilical pain.  Denies any radiation of the pain.  Patient reports usually pain subsides after eating.  Denies any constipation or diarrhea.  Denies dysuria or hematuria.  Reports having normal bowel movements since being placed on stool softeners.  Denies associated nausea or vomiting.  Reports having a good appetite.  Patient currently reports minimal pain at present just decided come the emergency room for evaluation due to persistent pain.    The history is provided by the patient.     Review of patient's allergies indicates:  No Known Allergies  Past Medical History:   Diagnosis Date    Cancer     HLD (hyperlipidemia)     Hypertension      Past Surgical History:   Procedure Laterality Date    ABDOMINAL SURGERY      LEFT HEART CATHETERIZATION Left 11/11/2022    Procedure: CATHETERIZATION, HEART, LEFT;  Surgeon: Colby Cook MD;  Location: [facility] CATH LAB;  Service: Cardiology;  Laterality: Left;  LHC +/- PCI    NEPHRECTOMY Right      History reviewed. No pertinent family history.  Social History     Tobacco Use    Smoking status: Never    Smokeless tobacco: Never   Substance Use Topics    Alcohol use: Never    Drug use: Never     Review of Systems   Constitutional:  Negative for chills, fatigue and fever.   HENT:  Negative for ear pain, rhinorrhea and sore throat.    Eyes:  Negative for  photophobia and pain.   Respiratory:  Negative for cough, shortness of breath and wheezing.    Cardiovascular:  Negative for chest pain.   Gastrointestinal:  Positive for abdominal pain. Negative for diarrhea, nausea and vomiting.   Genitourinary:  Negative for dysuria.   Neurological:  Negative for dizziness, weakness and headaches.   All other systems reviewed and are negative.      Physical Exam     Initial Vitals [01/03/24 0400]   BP Pulse Resp Temp SpO2   (!) 172/93 71 18 97.9 °F (36.6 °C) 99 %      MAP       --         Physical Exam    Nursing note and vitals reviewed.  Constitutional: He appears well-developed and well-nourished.   HENT:   Head: Normocephalic and atraumatic.   Eyes: EOM are normal. Pupils are equal, round, and reactive to light.   Neck: Neck supple.   Normal range of motion.  Cardiovascular:  Normal rate, regular rhythm and normal heart sounds.     Exam reveals no gallop and no friction rub.       No murmur heard.  Pulmonary/Chest: Breath sounds normal. No respiratory distress.   Abdominal: Abdomen is soft. Bowel sounds are normal. He exhibits no distension. There is no abdominal tenderness. There is no rebound and no guarding.   Musculoskeletal:         General: Normal range of motion.      Cervical back: Normal range of motion and neck supple.     Neurological: He is alert and oriented to person, place, and time. He has normal strength.   Skin: Skin is warm and dry.   Psychiatric: He has a normal mood and affect. His behavior is normal. Judgment and thought content normal.         ED Course   Procedures  Labs Reviewed   COMPREHENSIVE METABOLIC PANEL - Abnormal; Notable for the following components:       Result Value    Chloride 111 (*)     Glucose Level 78 (*)     Creatinine 1.38 (*)     Calcium Level Total 8.7 (*)     All other components within normal limits   URINALYSIS, REFLEX TO URINE CULTURE - Abnormal; Notable for the following components:    Protein, UA Trace (*)     Squamous  Epithelial Cells, UA Trace (*)     Mucous, UA Few (*)     Hyaline Casts, UA 3-5 (*)     All other components within normal limits   CBC WITH DIFFERENTIAL - Abnormal; Notable for the following components:    WBC 3.35 (*)     RBC 4.45 (*)     Hgb 12.4 (*)     Hct 40.5 (*)     MCHC 30.6 (*)     Neut # 1.39 (*)     All other components within normal limits   LIPASE - Normal   CBC W/ AUTO DIFFERENTIAL    Narrative:     The following orders were created for panel order CBC Auto Differential.  Procedure                               Abnormality         Status                     ---------                               -----------         ------                     CBC with Differential[[id number]]       Abnormal            Final result                 Please view results for these tests on the individual orders.          Imaging Results              X-Ray Abdomen Flat And Erect (Preliminary result)  Result time 01/03/24 05:51:02      Wet Read by Nash Livingston MD (01/03/24 05:51:02, [facility], Emergency Medicine)    Constipation; nonobstructive bowel gas pattern.                                     Medications   dicyclomine injection 10 mg (10 mg Intramuscular Given 1/3/24 0431)   aluminum-magnesium hydroxide-simethicone 200-200-20 mg/5 mL suspension 30 mL (30 mLs Oral Given 1/3/24 0431)     Medical Decision Making  Patient is a 83-year-old gentleman presents emergency room complaints of abdominal pain, mainly periumbilical.  Reports usually better with eating, reports pain is cramping, occurs intermittently lasts approximately 30 minutes and resolves.  No diaphoresis.  No dysuria or hematuria during episodes.  Patient reports having normal bowel movements.      Patient seen in the emergency room recently on 12/24/2023 and 12/29/2023 with similar complaints of chest pain and abdominal pain at that time.  Currently nontoxic appearing.  Patient had laboratory evaluation as well as x-ray done  during previous to ED encounters.  Will repeat laboratory evaluation today for comparison, and also obtain a repeat x-ray of the abdomen.  Will continue to monitor.      Amount and/or Complexity of Data Reviewed  Labs: ordered.  Radiology: ordered and independent interpretation performed.    Risk  OTC drugs.  Prescription drug management.               ED Course as of 01/03/24 0634 Wed Jan 03, 2024   0633 Feeling improved; stable for discharge to home.  ER precautions given for any acute worsening. [MW]      ED Course User Index  [MW] Nash Livingston MD                           Clinical Impression:  Final diagnoses:  [R10.9] Abdominal pain  [K59.00] Constipation, unspecified constipation type (Primary)  [R10.32] Abdominal pain, left lower quadrant          ED Disposition Condition    Discharge Stable          ED Prescriptions       Medication Sig Dispense Start Date End Date Auth. Provider    senna-docusate 8.6-50 mg (SENOKOT-S) 8.6-50 mg per tablet Take 2 tablets by mouth nightly as needed for Constipation. 28 tablet 1/3/2024 1/17/2024 Nash Livingston MD          Follow-up Information       Follow up With Specialties Details Why Contact Info    Chucky Logan MD Family Medicine   [address] [address]  [phone]      [facility] Emergency Medicine  As needed, If symptoms worsen [address] [facility] [address] [phone]             Nash Livingston MD  01/03/24 0634

## 2024-01-20 NOTE — FIRST PROVIDER EVALUATION
Medical screening exam completed.  I have conducted a focused provider triage encounter, findings are as follows:    Brief history of present illness:  83 y/o male presents with weakness, chest pain, and shortness of breath for 2 days.      There were no vitals filed for this visit.    Pertinent physical exam:  Patient is not toxic.  No respiratory distress.      Brief workup plan:  Labs, EKG, CXR, UA    Preliminary workup initiated; this workup will be continued and followed by the physician or advanced practice provider that is assigned to the patient when roomed.   Pain 2/2 Chronic Osteomyelitis Pain 2/2 Chronic Osteomyelitis

## 2024-01-27 ENCOUNTER — HOSPITAL ENCOUNTER (EMERGENCY)
Facility: HOSPITAL | Age: 84
Discharge: HOME | End: 2024-01-27
Attending: INTERNAL MEDICINE
Payer: MEDICARE

## 2024-01-27 VITALS
HEIGHT: 66 IN | TEMPERATURE: 98 F | OXYGEN SATURATION: 99 % | HEART RATE: 54 BPM | RESPIRATION RATE: 20 BRPM | BODY MASS INDEX: 25.15 KG/M2 | SYSTOLIC BLOOD PRESSURE: 130 MMHG | WEIGHT: 156.5 LBS | DIASTOLIC BLOOD PRESSURE: 82 MMHG

## 2024-01-27 DIAGNOSIS — R10.9 ABDOMINAL PAIN: ICD-10-CM

## 2024-01-27 DIAGNOSIS — R33.9 URINARY RETENTION: Primary | ICD-10-CM

## 2024-01-27 LAB
ALBUMIN SERPL-MCNC: 3.8 G/DL (ref 3.4–4.8)
ALBUMIN/GLOB SERPL: 1.3 RATIO (ref 1.1–2)
ALP SERPL-CCNC: 128 UNIT/L (ref 40–150)
ALT SERPL-CCNC: 9 UNIT/L (ref 0–55)
APPEARANCE UR: CLEAR
AST SERPL-CCNC: 16 UNIT/L (ref 5–34)
BACTERIA #/AREA URNS AUTO: ABNORMAL /HPF
BASOPHILS # BLD AUTO: 0.02 X10(3)/MCL
BASOPHILS NFR BLD AUTO: 0.5 %
BILIRUB SERPL-MCNC: 0.7 MG/DL
BILIRUB UR QL STRIP.AUTO: NEGATIVE
BUN SERPL-MCNC: 13.5 MG/DL (ref 8.4–25.7)
CALCIUM SERPL-MCNC: 8.8 MG/DL (ref 8.8–10)
CHLORIDE SERPL-SCNC: 110 MMOL/L (ref 98–107)
CO2 SERPL-SCNC: 25 MMOL/L (ref 23–31)
COLOR UR AUTO: ABNORMAL
CREAT SERPL-MCNC: 1.36 MG/DL (ref 0.73–1.18)
EOSINOPHIL # BLD AUTO: 0.19 X10(3)/MCL (ref 0–0.9)
EOSINOPHIL NFR BLD AUTO: 5.1 %
ERYTHROCYTE [DISTWIDTH] IN BLOOD BY AUTOMATED COUNT: 13.3 % (ref 11.5–17)
GFR SERPLBLD CREATININE-BSD FMLA CKD-EPI: 52 MLS/MIN/1.73/M2
GLOBULIN SER-MCNC: 2.9 GM/DL (ref 2.4–3.5)
GLUCOSE SERPL-MCNC: 103 MG/DL (ref 82–115)
GLUCOSE UR QL STRIP.AUTO: NORMAL
HCT VFR BLD AUTO: 43 % (ref 42–52)
HGB BLD-MCNC: 13.8 G/DL (ref 14–18)
HOLD SPECIMEN: NORMAL
HYALINE CASTS #/AREA URNS LPF: ABNORMAL /LPF
IMM GRANULOCYTES # BLD AUTO: 0.01 X10(3)/MCL (ref 0–0.04)
IMM GRANULOCYTES NFR BLD AUTO: 0.3 %
KETONES UR QL STRIP.AUTO: NEGATIVE
LEUKOCYTE ESTERASE UR QL STRIP.AUTO: NEGATIVE
LYMPHOCYTES # BLD AUTO: 1.13 X10(3)/MCL (ref 0.6–4.6)
LYMPHOCYTES NFR BLD AUTO: 30.1 %
MCH RBC QN AUTO: 29.3 PG (ref 27–31)
MCHC RBC AUTO-ENTMCNC: 32.1 G/DL (ref 33–36)
MCV RBC AUTO: 91.3 FL (ref 80–94)
MONOCYTES # BLD AUTO: 0.28 X10(3)/MCL (ref 0.1–1.3)
MONOCYTES NFR BLD AUTO: 7.4 %
MUCOUS THREADS URNS QL MICRO: ABNORMAL /LPF
NEUTROPHILS # BLD AUTO: 2.13 X10(3)/MCL (ref 2.1–9.2)
NEUTROPHILS NFR BLD AUTO: 56.6 %
NITRITE UR QL STRIP.AUTO: NEGATIVE
NRBC BLD AUTO-RTO: 0 %
PH UR STRIP.AUTO: 7 [PH]
PLATELET # BLD AUTO: 185 X10(3)/MCL (ref 130–400)
PMV BLD AUTO: 10.3 FL (ref 7.4–10.4)
POTASSIUM SERPL-SCNC: 3.6 MMOL/L (ref 3.5–5.1)
PROT SERPL-MCNC: 6.7 GM/DL (ref 5.8–7.6)
PROT UR QL STRIP.AUTO: NEGATIVE
PSA SERPL-MCNC: 5.97 NG/ML
RBC # BLD AUTO: 4.71 X10(6)/MCL (ref 4.7–6.1)
RBC #/AREA URNS AUTO: ABNORMAL /HPF
RBC UR QL AUTO: NEGATIVE
SODIUM SERPL-SCNC: 144 MMOL/L (ref 136–145)
SP GR UR STRIP.AUTO: 1.02 (ref 1–1.03)
SQUAMOUS #/AREA URNS LPF: ABNORMAL /HPF
TROPONIN I SERPL-MCNC: <0.01 NG/ML (ref 0–0.04)
UROBILINOGEN UR STRIP-ACNC: NORMAL
WBC # SPEC AUTO: 3.76 X10(3)/MCL (ref 4.5–11.5)
WBC #/AREA URNS AUTO: ABNORMAL /HPF

## 2024-01-27 PROCEDURE — 93005 ELECTROCARDIOGRAM TRACING: CPT

## 2024-01-27 PROCEDURE — 81001 URINALYSIS AUTO W/SCOPE: CPT | Performed by: INTERNAL MEDICINE

## 2024-01-27 PROCEDURE — 25000003 PHARM REV CODE 250: Performed by: INTERNAL MEDICINE

## 2024-01-27 PROCEDURE — 84484 ASSAY OF TROPONIN QUANT: CPT | Performed by: INTERNAL MEDICINE

## 2024-01-27 PROCEDURE — 99284 EMERGENCY DEPT VISIT MOD MDM: CPT

## 2024-01-27 PROCEDURE — 85025 COMPLETE CBC W/AUTO DIFF WBC: CPT | Performed by: INTERNAL MEDICINE

## 2024-01-27 PROCEDURE — 80053 COMPREHEN METABOLIC PANEL: CPT | Performed by: INTERNAL MEDICINE

## 2024-01-27 PROCEDURE — 84153 ASSAY OF PSA TOTAL: CPT | Performed by: INTERNAL MEDICINE

## 2024-01-27 RX ORDER — TAMSULOSIN HYDROCHLORIDE 0.4 MG/1
0.4 CAPSULE ORAL DAILY
Qty: 30 CAPSULE | Refills: 1 | OUTPATIENT
Start: 2024-01-27 | End: 2024-02-08

## 2024-01-27 RX ORDER — TAMSULOSIN HYDROCHLORIDE 0.4 MG/1
0.4 CAPSULE ORAL DAILY
Qty: 30 CAPSULE | Refills: 1 | Status: SHIPPED | OUTPATIENT
Start: 2024-01-27 | End: 2024-01-27

## 2024-01-27 RX ORDER — TAMSULOSIN HYDROCHLORIDE 0.4 MG/1
0.4 CAPSULE ORAL
Status: COMPLETED | OUTPATIENT
Start: 2024-01-27 | End: 2024-01-27

## 2024-01-27 RX ADMIN — TAMSULOSIN HYDROCHLORIDE 0.4 MG: 0.4 CAPSULE ORAL at 12:01

## 2024-01-27 NOTE — ED PROVIDER NOTES
Encounter Date: 1/27/2024       History     Chief Complaint   Patient presents with    Abdominal Pain     Pt reports midline abdominal pain x 1 day. Recent bladder infections.      Presents with suprapubic pain for the last 3 days. Denies hematuria, hesitancy, dribbling or dysuria. No fever or nausea.    The history is provided by the patient.     Review of patient's allergies indicates:  No Known Allergies  Past Medical History:   Diagnosis Date    Cancer     HLD (hyperlipidemia)     Hypertension      Past Surgical History:   Procedure Laterality Date    ABDOMINAL SURGERY      LEFT HEART CATHETERIZATION Left 11/11/2022    Procedure: CATHETERIZATION, HEART, LEFT;  Surgeon: Colby Cook MD;  Location: [facility] CATH LAB;  Service: Cardiology;  Laterality: Left;  LHC +/- PCI    NEPHRECTOMY Right      No family history on file.  Social History     Tobacco Use    Smoking status: Never    Smokeless tobacco: Never   Substance Use Topics    Alcohol use: Never    Drug use: Never     Review of Systems   Constitutional:  Negative for fever.   HENT:  Negative for sore throat.    Respiratory:  Negative for shortness of breath.    Cardiovascular:  Negative for chest pain.   Gastrointestinal:  Positive for abdominal pain. Negative for nausea.   Genitourinary:  Negative for dysuria.   Musculoskeletal:  Negative for back pain.   Skin:  Negative for rash.   Neurological:  Negative for weakness.   Hematological:  Does not bruise/bleed easily.   All other systems reviewed and are negative.      Physical Exam     Initial Vitals [01/27/24 1101]   BP Pulse Resp Temp SpO2   138/88 70 16 98.2 °F (36.8 °C) 100 %      MAP       --         Physical Exam    Nursing note and vitals reviewed.  Constitutional: He appears well-developed and well-nourished. No distress.   HENT:   Head: Normocephalic and atraumatic.   Eyes: Pupils are equal, round, and reactive to light.   Neck: Neck supple.   Normal range of motion.  Cardiovascular:  Regular rhythm,  normal heart sounds and intact distal pulses.           Pulmonary/Chest: Breath sounds normal. No respiratory distress.   Abdominal: Abdomen is soft. Bowel sounds are normal. He exhibits no distension. There is no abdominal tenderness. There is no rebound and no guarding.   Musculoskeletal:         General: No edema. Normal range of motion.      Cervical back: Normal range of motion and neck supple.     Neurological: He is alert and oriented to person, place, and time. He has normal strength. GCS score is 15. GCS eye subscore is 4. GCS verbal subscore is 5. GCS motor subscore is 6.   Skin: Skin is warm and dry. No rash noted.   Psychiatric: His behavior is normal.         ED Course   Procedures  Labs Reviewed   COMPREHENSIVE METABOLIC PANEL - Abnormal; Notable for the following components:       Result Value    Chloride 110 (*)     Creatinine 1.36 (*)     All other components within normal limits   URINALYSIS - Abnormal; Notable for the following components:    Mucous, UA Trace (*)     All other components within normal limits   CBC WITH DIFFERENTIAL - Abnormal; Notable for the following components:    WBC 3.76 (*)     Hgb 13.8 (*)     MCHC 32.1 (*)     All other components within normal limits   TROPONIN I - Normal   CBC W/ AUTO DIFFERENTIAL    Narrative:     The following orders were created for panel order CBC auto differential.  Procedure                               Abnormality         Status                     ---------                               -----------         ------                     CBC with Differential[[id number]]       Abnormal            Final result                 Please view results for these tests on the individual orders.   EXTRA TUBES    Narrative:     The following orders were created for panel order EXTRA TUBES.  Procedure                               Abnormality         Status                     ---------                               -----------         ------                      Light Blue Top Hold[[id number]]                             In process                 Lavender Top Hold[[id number]]                               In process                 Gold Top Hold[[id number]]                                   In process                   Please view results for these tests on the individual orders.   LIGHT BLUE TOP HOLD   LAVENDER TOP HOLD   GOLD TOP HOLD   PSA, SCREENING     EKG Readings: (Independently Interpreted)   Initial Reading: No STEMI. Rhythm: Normal Sinus Rhythm. Heart Rate: 62. Ectopy: No Ectopy. Conduction: Normal. ST Segments: Normal ST Segments. T Waves: Normal. Clinical Impression: Normal Sinus Rhythm     ECG Results              EKG 12-lead (In process)  Result time 01/27/24 11:43:36      In process by Interface, Lab In [facility] (01/27/24 11:43:36)                   Narrative:    Test Reason : R10.9,    Vent. Rate : 062 BPM     Atrial Rate : 062 BPM     P-R Int : 156 ms          QRS Dur : 100 ms      QT Int : 388 ms       P-R-T Axes : 076 065 052 degrees     QTc Int : 393 ms    Normal sinus rhythm  Normal ECG  When compared with ECG of 29-DEC-2023 03:58,  No significant change was found    Referred By: [email or web address]   SELF           Confirmed By:                                   Imaging Results    None          Medications   tamsulosin 24 hr capsule 0.4 mg (0.4 mg Oral Given 1/27/24 1252)     Medical Decision Making  Amount and/or Complexity of Data Reviewed  Labs: ordered. Decision-making details documented in ED Course.  ECG/medicine tests: ordered and independent interpretation performed. Decision-making details documented in ED Course.    Risk  Prescription drug management.      Additional MDM:   Differential Diagnosis:   Urethritis, cystitis, pyelonephritis, herpes, candidiasis, among others                        12:53 PM    At this time pt stable. Bladder scanner after voiding reveal more than 900 ml of residual urine. Indwelling pereira recommended to the patient but Pt  states just mild discomfort for which he will prefer no pereira at this time. Flomax will be started with urology referral              Clinical Impression:  Final diagnoses:  [R10.9] Abdominal pain  [R33.9] Urinary retention (Primary)          ED Disposition Condition    Discharge Stable          ED Prescriptions       Medication Sig Dispense Start Date End Date Auth. Provider    tamsulosin (FLOMAX) 0.4 mg Cap Take 1 capsule (0.4 mg total) by mouth once daily. 30 capsule 1/27/2024 1/26/2025 Elvin Prado MD          Follow-up Information    None          Elvin Prado MD  01/27/24 9389

## 2024-02-08 ENCOUNTER — HOSPITAL ENCOUNTER (EMERGENCY)
Facility: HOSPITAL | Age: 84
Discharge: HOME | End: 2024-02-08
Attending: STUDENT IN AN ORGANIZED HEALTH CARE EDUCATION/TRAINING PROGRAM
Payer: MEDICARE

## 2024-02-08 VITALS
SYSTOLIC BLOOD PRESSURE: 142 MMHG | TEMPERATURE: 99 F | OXYGEN SATURATION: 98 % | BODY MASS INDEX: 23.64 KG/M2 | RESPIRATION RATE: 12 BRPM | DIASTOLIC BLOOD PRESSURE: 87 MMHG | HEIGHT: 68 IN | HEART RATE: 50 BPM | WEIGHT: 156 LBS

## 2024-02-08 DIAGNOSIS — N40.1 ENLARGED PROSTATE WITH URINARY RETENTION: Primary | ICD-10-CM

## 2024-02-08 DIAGNOSIS — R33.8 ENLARGED PROSTATE WITH URINARY RETENTION: Primary | ICD-10-CM

## 2024-02-08 LAB
ALBUMIN SERPL-MCNC: 4.2 G/DL (ref 3.4–4.8)
ALBUMIN/GLOB SERPL: 1.2 RATIO (ref 1.1–2)
ALP SERPL-CCNC: 142 UNIT/L (ref 40–150)
ALT SERPL-CCNC: 10 UNIT/L (ref 0–55)
APPEARANCE UR: CLEAR
AST SERPL-CCNC: 20 UNIT/L (ref 5–34)
BACTERIA #/AREA URNS AUTO: NORMAL /HPF
BASOPHILS # BLD AUTO: 0.02 X10(3)/MCL
BASOPHILS NFR BLD AUTO: 0.4 %
BILIRUB SERPL-MCNC: 0.6 MG/DL
BILIRUB UR QL STRIP.AUTO: NEGATIVE
BUN SERPL-MCNC: 18 MG/DL (ref 8.4–25.7)
CALCIUM SERPL-MCNC: 9 MG/DL (ref 8.8–10)
CHLORIDE SERPL-SCNC: 108 MMOL/L (ref 98–107)
CO2 SERPL-SCNC: 24 MMOL/L (ref 23–31)
COLOR UR AUTO: COLORLESS
CREAT SERPL-MCNC: 1.41 MG/DL (ref 0.73–1.18)
EOSINOPHIL # BLD AUTO: 0.22 X10(3)/MCL (ref 0–0.9)
EOSINOPHIL NFR BLD AUTO: 4.8 %
ERYTHROCYTE [DISTWIDTH] IN BLOOD BY AUTOMATED COUNT: 13.6 % (ref 11.5–17)
GFR SERPLBLD CREATININE-BSD FMLA CKD-EPI: 49 MLS/MIN/1.73/M2
GLOBULIN SER-MCNC: 3.4 GM/DL (ref 2.4–3.5)
GLUCOSE SERPL-MCNC: 79 MG/DL (ref 82–115)
GLUCOSE UR QL STRIP.AUTO: NORMAL
HCT VFR BLD AUTO: 44.3 % (ref 42–52)
HGB BLD-MCNC: 13.8 G/DL (ref 14–18)
IMM GRANULOCYTES # BLD AUTO: 0.01 X10(3)/MCL (ref 0–0.04)
IMM GRANULOCYTES NFR BLD AUTO: 0.2 %
INR PPP: 1.1
KETONES UR QL STRIP.AUTO: NEGATIVE
LEUKOCYTE ESTERASE UR QL STRIP.AUTO: NEGATIVE
LIPASE SERPL-CCNC: 21 U/L
LYMPHOCYTES # BLD AUTO: 1.69 X10(3)/MCL (ref 0.6–4.6)
LYMPHOCYTES NFR BLD AUTO: 37.2 %
MCH RBC QN AUTO: 28.4 PG (ref 27–31)
MCHC RBC AUTO-ENTMCNC: 31.2 G/DL (ref 33–36)
MCV RBC AUTO: 91.2 FL (ref 80–94)
MONOCYTES # BLD AUTO: 0.41 X10(3)/MCL (ref 0.1–1.3)
MONOCYTES NFR BLD AUTO: 9 %
NEUTROPHILS # BLD AUTO: 2.19 X10(3)/MCL (ref 2.1–9.2)
NEUTROPHILS NFR BLD AUTO: 48.4 %
NITRITE UR QL STRIP.AUTO: NEGATIVE
NRBC BLD AUTO-RTO: 0 %
PH UR STRIP.AUTO: 6 [PH]
PLATELET # BLD AUTO: 239 X10(3)/MCL (ref 130–400)
PMV BLD AUTO: 10.3 FL (ref 7.4–10.4)
POTASSIUM SERPL-SCNC: 3.6 MMOL/L (ref 3.5–5.1)
PROT SERPL-MCNC: 7.6 GM/DL (ref 5.8–7.6)
PROT UR QL STRIP.AUTO: NEGATIVE
PROTHROMBIN TIME: 13.8 SECONDS (ref 12.5–14.5)
RBC # BLD AUTO: 4.86 X10(6)/MCL (ref 4.7–6.1)
RBC #/AREA URNS AUTO: NORMAL /HPF
RBC UR QL AUTO: NEGATIVE
SODIUM SERPL-SCNC: 141 MMOL/L (ref 136–145)
SP GR UR STRIP.AUTO: 1.01 (ref 1–1.03)
SQUAMOUS #/AREA URNS LPF: NORMAL /HPF
UROBILINOGEN UR STRIP-ACNC: NORMAL
WBC # SPEC AUTO: 4.54 X10(3)/MCL (ref 4.5–11.5)
WBC #/AREA URNS AUTO: NORMAL /HPF

## 2024-02-08 PROCEDURE — 99285 EMERGENCY DEPT VISIT HI MDM: CPT | Mod: 25

## 2024-02-08 PROCEDURE — 25000003 PHARM REV CODE 250: Performed by: STUDENT IN AN ORGANIZED HEALTH CARE EDUCATION/TRAINING PROGRAM

## 2024-02-08 PROCEDURE — 80053 COMPREHEN METABOLIC PANEL: CPT | Performed by: STUDENT IN AN ORGANIZED HEALTH CARE EDUCATION/TRAINING PROGRAM

## 2024-02-08 PROCEDURE — 85025 COMPLETE CBC W/AUTO DIFF WBC: CPT | Performed by: STUDENT IN AN ORGANIZED HEALTH CARE EDUCATION/TRAINING PROGRAM

## 2024-02-08 PROCEDURE — 81001 URINALYSIS AUTO W/SCOPE: CPT | Performed by: STUDENT IN AN ORGANIZED HEALTH CARE EDUCATION/TRAINING PROGRAM

## 2024-02-08 PROCEDURE — 83690 ASSAY OF LIPASE: CPT | Performed by: STUDENT IN AN ORGANIZED HEALTH CARE EDUCATION/TRAINING PROGRAM

## 2024-02-08 PROCEDURE — 25500020 PHARM REV CODE 255: Performed by: STUDENT IN AN ORGANIZED HEALTH CARE EDUCATION/TRAINING PROGRAM

## 2024-02-08 PROCEDURE — 85610 PROTHROMBIN TIME: CPT | Performed by: STUDENT IN AN ORGANIZED HEALTH CARE EDUCATION/TRAINING PROGRAM

## 2024-02-08 RX ORDER — LIDOCAINE HYDROCHLORIDE 20 MG/ML
JELLY TOPICAL
Status: COMPLETED | OUTPATIENT
Start: 2024-02-08 | End: 2024-02-08

## 2024-02-08 RX ORDER — AMLODIPINE BESYLATE 5 MG/1
5 TABLET ORAL DAILY
COMMUNITY

## 2024-02-08 RX ORDER — TAMSULOSIN HYDROCHLORIDE 0.4 MG/1
0.4 CAPSULE ORAL DAILY
Qty: 30 CAPSULE | Refills: 0 | Status: SHIPPED | OUTPATIENT
Start: 2024-02-08 | End: 2024-03-09

## 2024-02-08 RX ADMIN — IOPAMIDOL 100 ML: 755 INJECTION, SOLUTION INTRAVENOUS at 05:02

## 2024-02-08 RX ADMIN — LIDOCAINE HYDROCHLORIDE: 20 JELLY TOPICAL at 06:02

## 2024-02-08 NOTE — DISCHARGE INSTRUCTIONS
Thanks for letting us take care of you today!  It is our goal to give you courteous care and to keep you comfortable and informed, if you have any questions before you leave I will be happy to try and answer them.    Here is some advice after your visit:    Your visit in the emergency department is NOT definitive care - please follow-up with your primary care doctor and/or specialist within 1-2 days. Please return to the emergency department if you develop worsening symptoms including: fever, chills, chest pain, shortness of breath, weakness, numbness, tingling, nausea, vomiting, inability to eat, drink, or take your medication. Please return if you have any worsening in your condition or if you have any other concerns.    If you had radiology exams like an XRAY or CT in the emergency Department the interpreation on them may be preliminary - there may be less time sensitive findings on the reports please obtain these reports within 24 hours from the hospital or by using your out on your mobile phone to access records.  Bring these to your primary care doctor and/or specialist for further review of incidental findings.    Please review any LAB WORK from your visit today with your primary care physician.     Please continue to take the tamsulosin/Flomax as prescribed.  I  have prescribed you 30 additional pills so you do not run out.\      You have been referred to Urology.  Contact information for urologist has has been provided, Dr. Du.  Please call for an appointment.  Please keep the Aranda in place until you see urology.  Please come in the emergency department if you have any worsening symptoms fever chills nausea vomiting.

## 2024-02-08 NOTE — ED PROVIDER NOTES
Encounter Date: 2/8/2024    SCRIBE #1 NOTE: I, Griselda Burks, am scribing for, and in the presence of,  Isidro Araujo MD. I have scribed the following portions of the note - Other sections scribed: HPI, ROS, PE.       History     Chief Complaint   Patient presents with    Abdominal Pain     Arrives aasi unit 4 reports pt w/ abd pain for 2 weeks seen at [facility] dx urinary retention, abd pain worsening tonight, dark red stools started yesterday     83 year old male with history of HTN, HLD, and cancer presents to the ED with complaints of suprapubic tenderness and urinary retention onset 2 weeks ago. Per triage note, pt is here for blood in stool, but pt denies this. Pt reports that he went to [facility] 2 weeks ago with the same symptoms and was discharged with flomax. Pt reports that he is still having difficulty with urinating, suprapubic tenderness, and dysuria. He denies hematuria, fever, and chills.     The history is provided by the patient. No  was used.     Review of patient's allergies indicates:  No Known Allergies  Past Medical History:   Diagnosis Date    Cancer     HLD (hyperlipidemia)     Hypertension      Past Surgical History:   Procedure Laterality Date    ABDOMINAL SURGERY      LEFT HEART CATHETERIZATION Left 11/11/2022    Procedure: CATHETERIZATION, HEART, LEFT;  Surgeon: Colby Cook MD;  Location: [facility] CATH LAB;  Service: Cardiology;  Laterality: Left;  LHC +/- PCI    NEPHRECTOMY Right      No family history on file.  Social History     Tobacco Use    Smoking status: Never    Smokeless tobacco: Never   Substance Use Topics    Alcohol use: Never    Drug use: Never     Review of Systems   Constitutional:  Negative for chills and fever.   Genitourinary:  Positive for decreased urine volume, difficulty urinating and dysuria. Negative for hematuria.   All other systems reviewed and are negative.      Physical Exam     Initial Vitals [02/08/24 0450]   BP Pulse Resp Temp SpO2   (!)  147/84 61 16 99.3 °F (37.4 °C) 98 %      MAP       --         Physical Exam    Constitutional: He appears well-developed and well-nourished. He is not diaphoretic. No distress.   HENT:   Head: Normocephalic and atraumatic.   Right Ear: External ear normal.   Left Ear: External ear normal.   Nose: Nose normal.   Eyes: EOM are normal. Pupils are equal, round, and reactive to light. Right eye exhibits no discharge. Left eye exhibits no discharge.   Cardiovascular:  Normal rate, regular rhythm and normal heart sounds.     Exam reveals no gallop and no friction rub.       No murmur heard.  Pulmonary/Chest: Effort normal and breath sounds normal. No respiratory distress. He has no wheezes. He has no rhonchi. He has no rales. He exhibits no tenderness.   Abdominal: Abdomen is soft. Bowel sounds are normal. He exhibits no distension and no mass. There is abdominal tenderness (mild, suprapubic). There is no rebound and no guarding.   Musculoskeletal:         General: No edema. Normal range of motion.     Neurological: He is alert and oriented to person, place, and time. No cranial nerve deficit or sensory deficit.   Skin: Skin is warm and dry. Capillary refill takes less than 2 seconds.         ED Course   Procedures  Labs Reviewed   COMPREHENSIVE METABOLIC PANEL - Abnormal; Notable for the following components:       Result Value    Chloride 108 (*)     Glucose Level 79 (*)     Creatinine 1.41 (*)     All other components within normal limits   CBC WITH DIFFERENTIAL - Abnormal; Notable for the following components:    Hgb 13.8 (*)     MCHC 31.2 (*)     All other components within normal limits   LIPASE - Normal   URINALYSIS, REFLEX TO URINE CULTURE - Normal   PROTIME-INR - Normal   CBC W/ AUTO DIFFERENTIAL    Narrative:     The following orders were created for panel order CBC auto differential.  Procedure                               Abnormality         Status                     ---------                                -----------         ------                     CBC with Differential[[id number]]       Abnormal            Final result                 Please view results for these tests on the individual orders.          Imaging Results              CT Abdomen Pelvis With IV Contrast NO Oral Contrast (Final result)  Result time 02/08/24 07:10:22      Final result by Summer Anderson MD (02/08/24 07:10:22)                   Impression:      Prostatomegaly with mild distension of the urinary bladder.      Electronically signed by: Summer Anderson  Date:    02/08/2024  Time:    07:10               Narrative:    EXAMINATION:  CT ABDOMEN PELVIS WITH IV CONTRAST    CLINICAL HISTORY:  Abdominal abscess/infection suspected;RLQ abdominal pain (Age >= 14y);Suprapubic, right lower quadrant abdominal pain;    TECHNIQUE:  Helically acquired images with axial, sagittal and coronal reformations were obtained from the lung bases to the pubic symphysis after the IV administration of contrast.    Automated tube current modulation, weight-based exposure dosing, and/or iterative reconstruction technique utilized to reach lowest reasonably achievable exposure rate.    DLP: 296 mGy*cm    COMPARISON:  CT abdomen pelvis 10/28/2021, CT abdomen pelvis 07/22/2023    FINDINGS:  HEART: Mild cardiomegaly.  There are calcifications at the aortic valve.    LUNG BASES: Mild emphysema.    LIVER: Unchanged hepatic hemangiomata and cysts.    BILIARY: Cholelithiasis.    PANCREAS: No inflammatory change.  Incidental pancreas divisum.    SPLEEN: Normal in size    ADRENALS: No mass.    KIDNEYS/URETERS: Cortical scarring at the upper pole right kidney and lower pole left kidney.  Too small to characterize bilateral renal cortical hypodensities appears similar to previous.    GI TRACT/MESENTERY:  No evidence of bowel obstruction or inflammation. The appendix is normal.    PERITONEUM: No free fluid.No free air.    LYMPH NODES: No enlarged lymph nodes by size  criteria.    VASCULATURE: Unchanged appearance of the abdominal aorta with presumed post treatment/postsurgical changes.  Patent runoff to the groin.    BLADDER: The bladder is distended above the pelvic brim.    REPRODUCTIVE ORGANS: The prostate is enlarged and indents upon the base of the urinary bladder.  Prostate estimated to measure 5.7 x 5.1 x 4.9 cm.    SOFT TISSUES: There are postsurgical changes of right inguinal hernia repair.    BONES: Degenerative change at the spine.  Grade 1 anterolisthesis of L2 on L3 and L3 on L4 with uncovering of the disc space.  Central canal stenosis related to spondylolisthesis and degenerative changes.                                       Medications   iopamidoL (ISOVUE-370) injection 100 mL (100 mLs Intravenous Given 2/8/24 8383)   LIDOcaine HCl 2% urojet ( Mucous Membrane Given 2/8/24 5866)     Medical Decision Making      Differential diagnosis includes but is not limited to   Urinary retention, prostatomegaly, urinary tract infection, pyelonephritis,      Patient was awake alert well-appearing.  Abdomen is soft.  Mild suprapubic tenderness.  No obvious mass felt.  He was able to spontaneously void here in the emergency department but his postvoid have proximally 300 mL retained.  Clearly he was not fully emptying his bladder.  Prostatomegaly on CT with no dilation of ureters noted.  No evidence of urinary tract infection remainder of labs within normal limits.  He was seen 2 weeks ago with similar symptoms.  Offered a Aranda but declined.  He is amenable to Aranda today.  We will place a Aranda.  Discharge.  He does have Flomax.  We will ask him to continue his Flomax.  I will refer to Urology.  Discussed with the patient.  He was comfortable with plan. Return precautions given.  Questions invited, questions answered to the best my ability.  Patient discharged home condition stable.        Amount and/or Complexity of Data Reviewed  External Data Reviewed: notes.     Details:  Chart review reveals that patient was seen at outside hospital 01/27/2024. Bladder scan at that time showed 900 mL in his bladder. Indwelling Aranda was recommended but he declined and he was discharged with Flomax.. Seen 01/03/2023 also at outside hospital at that time diagnosed with constipation. Presented with abdominal pain.   Labs: ordered. Decision-making details documented in ED Course.  Radiology: ordered and independent interpretation performed.     Details:   Prostatomegaly no  hydroureter or hydronephrosis.    Risk  OTC drugs.  Prescription drug management.              Attending Attestation:           Physician Attestation for Scribe:  Physician Attestation Statement for Scribe #1: I, Isidro Araujo MD, reviewed documentation, as scribed by Griselda Burks in my presence, and it is both accurate and complete.             ED Course as of 02/08/24 0718   Thu Feb 08, 2024   0458   Chart review reveals that patient was seen at outside hospital 01/27/2024.  Bladder scan at that time showed 900 mL in his bladder.  Indwelling Aranda was recommended but he declined and he was discharged with Flomax..  Seen 01/03/2023 also at outside hospital at that time diagnosed with constipation.  Presented with abdominal pain. [MM]   0538 CBC auto differential(!)   Stable anemia.  No leukocytosis. [MM]   0710 INR: 1.1 [MM]   0710 Lipase: 21 [MM]   0710 Comprehensive metabolic panel(!)    No significant electrolyte abnormalities.  Stable creatinine. [MM]   0710 Urinalysis, Reflex to Urine Culture    No evidence of UTI. [MM]      ED Course User Index  [MM] Isidro Araujo MD                           Clinical Impression:  Final diagnoses:  [N40.1, R33.8] Enlarged prostate with urinary retention (Primary)          ED Disposition Condition    Discharge Stable          ED Prescriptions       Medication Sig Dispense Start Date End Date Auth. Provider    tamsulosin (FLOMAX) 0.4 mg Cap Take 1 capsule (0.4 mg total) by mouth once  daily. 30 capsule 2/8/2024 3/9/2024 Isidro Araujo MD          Follow-up Information       Follow up With Specialties Details Why Contact Info    Chucky Logan MD Family Medicine Call   [address] [facility] [address]  [phone]      Joby Du MD Urology Call   [address] [address]  [phone]               Isidro Araujo MD  02/08/24 0718

## 2024-06-01 ENCOUNTER — HOSPITAL ENCOUNTER (EMERGENCY)
Facility: HOSPITAL | Age: 84
Discharge: HOME | End: 2024-06-01
Attending: EMERGENCY MEDICINE
Payer: MEDICARE

## 2024-06-01 VITALS
HEART RATE: 76 BPM | TEMPERATURE: 98 F | RESPIRATION RATE: 18 BRPM | OXYGEN SATURATION: 99 % | WEIGHT: 155.63 LBS | SYSTOLIC BLOOD PRESSURE: 166 MMHG | HEIGHT: 68 IN | BODY MASS INDEX: 23.59 KG/M2 | DIASTOLIC BLOOD PRESSURE: 91 MMHG

## 2024-06-01 DIAGNOSIS — R51.9 LEFT-SIDED HEADACHE: Primary | ICD-10-CM

## 2024-06-01 DIAGNOSIS — M54.2 NECK PAIN ON LEFT SIDE: ICD-10-CM

## 2024-06-01 LAB
ALBUMIN SERPL-MCNC: 4 G/DL (ref 3.4–4.8)
ALBUMIN/GLOB SERPL: 1.2 RATIO (ref 1.1–2)
ALP SERPL-CCNC: 136 UNIT/L (ref 40–150)
ALT SERPL-CCNC: 11 UNIT/L (ref 0–55)
ANION GAP SERPL CALC-SCNC: 8 MEQ/L
AST SERPL-CCNC: 18 UNIT/L (ref 5–34)
BASOPHILS # BLD AUTO: 0.02 X10(3)/MCL
BASOPHILS NFR BLD AUTO: 0.6 %
BILIRUB SERPL-MCNC: 1 MG/DL
BUN SERPL-MCNC: 12.9 MG/DL (ref 8.4–25.7)
CALCIUM SERPL-MCNC: 9.6 MG/DL (ref 8.8–10)
CHLORIDE SERPL-SCNC: 108 MMOL/L (ref 98–107)
CO2 SERPL-SCNC: 27 MMOL/L (ref 23–31)
CREAT SERPL-MCNC: 1.3 MG/DL (ref 0.73–1.18)
CREAT/UREA NIT SERPL: 10
EOSINOPHIL # BLD AUTO: 0.11 X10(3)/MCL (ref 0–0.9)
EOSINOPHIL NFR BLD AUTO: 3.2 %
ERYTHROCYTE [DISTWIDTH] IN BLOOD BY AUTOMATED COUNT: 13.2 % (ref 11.5–17)
GFR SERPLBLD CREATININE-BSD FMLA CKD-EPI: 54 ML/MIN/1.73/M2
GLOBULIN SER-MCNC: 3.3 GM/DL (ref 2.4–3.5)
GLUCOSE SERPL-MCNC: 91 MG/DL (ref 82–115)
HCT VFR BLD AUTO: 45.7 % (ref 42–52)
HGB BLD-MCNC: 14.2 G/DL (ref 14–18)
HOLD SPECIMEN: NORMAL
IMM GRANULOCYTES # BLD AUTO: 0.01 X10(3)/MCL (ref 0–0.04)
IMM GRANULOCYTES NFR BLD AUTO: 0.3 %
LYMPHOCYTES # BLD AUTO: 1.29 X10(3)/MCL (ref 0.6–4.6)
LYMPHOCYTES NFR BLD AUTO: 37.6 %
MCH RBC QN AUTO: 28.3 PG (ref 27–31)
MCHC RBC AUTO-ENTMCNC: 31.1 G/DL (ref 33–36)
MCV RBC AUTO: 91.2 FL (ref 80–94)
MONOCYTES # BLD AUTO: 0.32 X10(3)/MCL (ref 0.1–1.3)
MONOCYTES NFR BLD AUTO: 9.3 %
NEUTROPHILS # BLD AUTO: 1.68 X10(3)/MCL (ref 2.1–9.2)
NEUTROPHILS NFR BLD AUTO: 49 %
NRBC BLD AUTO-RTO: 0 %
PLATELET # BLD AUTO: 255 X10(3)/MCL (ref 130–400)
PMV BLD AUTO: 10.1 FL (ref 7.4–10.4)
POTASSIUM SERPL-SCNC: 4.4 MMOL/L (ref 3.5–5.1)
PROT SERPL-MCNC: 7.3 GM/DL (ref 5.8–7.6)
RBC # BLD AUTO: 5.01 X10(6)/MCL (ref 4.7–6.1)
SODIUM SERPL-SCNC: 143 MMOL/L (ref 136–145)
WBC # SPEC AUTO: 3.43 X10(3)/MCL (ref 4.5–11.5)

## 2024-06-01 PROCEDURE — 25000003 PHARM REV CODE 250: Performed by: PHYSICIAN ASSISTANT

## 2024-06-01 PROCEDURE — 99284 EMERGENCY DEPT VISIT MOD MDM: CPT | Mod: 25

## 2024-06-01 PROCEDURE — 80053 COMPREHEN METABOLIC PANEL: CPT | Performed by: PHYSICIAN ASSISTANT

## 2024-06-01 PROCEDURE — 85025 COMPLETE CBC W/AUTO DIFF WBC: CPT | Performed by: PHYSICIAN ASSISTANT

## 2024-06-01 RX ORDER — NAPROXEN 250 MG/1
500 TABLET ORAL
Status: COMPLETED | OUTPATIENT
Start: 2024-06-01 | End: 2024-06-01

## 2024-06-01 RX ORDER — NAPROXEN 250 MG/1
250 TABLET ORAL 2 TIMES DAILY PRN
Qty: 14 TABLET | Refills: 0 | Status: SHIPPED | OUTPATIENT
Start: 2024-06-01

## 2024-06-01 RX ADMIN — NAPROXEN 500 MG: 250 TABLET ORAL at 12:06

## 2024-06-01 NOTE — ED PROVIDER NOTES
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 He describes headache as throbbing and rates the pain a 5/10. He does admit that when he turns his head the pain is his neck seems to worsen. He feels that he may have slept the wrong way and that is why he is in pain. He reports taking aspirin yesterday for pain with some relief. He denies any recent falls or head trauma, etoh abuse, nausea, vomiting, photophobia, phonophobia, syncope, fever, chills, rash, ams.     The history is provided by the patient. No  was used.     Review of patient's allergies indicates:  No Known Allergies  Past Medical History:   Diagnosis Date    Cancer     HLD (hyperlipidemia)     Hypertension      Past Surgical History:   Procedure Laterality Date    ABDOMINAL SURGERY      LEFT HEART CATHETERIZATION Left 11/11/2022    Procedure: CATHETERIZATION, HEART, LEFT;  Surgeon: Colby Cook MD;  Location: [facility] CATH LAB;  Service: Cardiology;  Laterality: Left;  LHC +/- PCI    NEPHRECTOMY Right      No family history on file.  Social History     Tobacco Use    Smoking status: Never    Smokeless tobacco: Never   Substance Use Topics    Alcohol use: Never    Drug use: Never     Review of Systems   Constitutional:  Negative for chills and fever.   Respiratory:  Negative for cough, chest tightness and shortness of breath.    Cardiovascular:  Negative for chest pain.   Gastrointestinal:  Negative for abdominal pain, constipation, diarrhea, nausea and vomiting.   Genitourinary:  Negative for flank pain.   Musculoskeletal:  Positive for neck pain.   Skin:  Negative for  rash.   Neurological:  Positive for headaches. Negative for dizziness, tremors, seizures, syncope, facial asymmetry, speech difficulty, weakness, light-headedness and numbness.   Hematological:         Takes asa daily   All other systems reviewed and are negative.      Physical Exam     Initial Vitals [06/01/24 1033]   BP Pulse Resp Temp SpO2   (!) 166/91 76 18 97.6 °F (36.4 °C) 99 %      MAP       --         Physical Exam    Nursing note and vitals reviewed.  Constitutional: He appears well-developed. He is not diaphoretic. No distress.   HENT:   Head: Normocephalic and atraumatic.   Right Ear: External ear normal.   Left Ear: External ear normal.   Nose: Nose normal.   Mouth/Throat: Oropharynx is clear and moist. No oropharyngeal exudate.   Eyes: Conjunctivae and EOM are normal. Pupils are equal, round, and reactive to light. No scleral icterus.   Neck: Neck supple.   Normal range of motion.  Cardiovascular:  Normal rate, regular rhythm, normal heart sounds and intact distal pulses.           Pulmonary/Chest: Breath sounds normal. No respiratory distress. He has no wheezes. He has no rhonchi. He has no rales.   Abdominal: Abdomen is soft. He exhibits no distension. There is no abdominal tenderness.   Musculoskeletal:         General: No edema.      Cervical back: Normal range of motion and neck supple.     Neurological: He is alert and oriented to person, place, and time. He has normal strength. No sensory deficit. GCS score is 15. GCS eye subscore is 4. GCS verbal subscore is 5. GCS motor subscore is 6.   Skin: Skin is warm and dry. Capillary refill takes less than 2 seconds. No rash noted.   Psychiatric: He has a normal mood and affect.         ED Course   Procedures  Labs Reviewed   COMPREHENSIVE METABOLIC PANEL - Abnormal; Notable for the following components:       Result Value    Chloride 108 (*)     Creatinine 1.30 (*)     All other components within normal limits   CBC WITH DIFFERENTIAL - Abnormal; Notable  for the following components:    WBC 3.43 (*)     MCHC 31.1 (*)     Neut # 1.68 (*)     All other components within normal limits   CBC W/ AUTO DIFFERENTIAL    Narrative:     The following orders were created for panel order CBC auto differential.  Procedure                               Abnormality         Status                     ---------                               -----------         ------                     CBC with Differential[[id number]]       Abnormal            Final result                 Please view results for these tests on the individual orders.   EXTRA TUBES    Narrative:     The following orders were created for panel order EXTRA TUBES.  Procedure                               Abnormality         Status                     ---------                               -----------         ------                     Red Top Hold[[id number]]                                    In process                   Please view results for these tests on the individual orders.   RED TOP HOLD          Imaging Results              CT Head Without Contrast (Final result)  Result time 06/01/24 11:56:19      Final result by Chance Cox MD (06/01/24 11:56:19)                   Impression:      No acute intracranial abnormality.      Electronically signed by: Chance Cox MD  Date:    06/01/2024  Time:    11:56               Narrative:    EXAMINATION:  CT HEAD WITHOUT CONTRAST    CLINICAL HISTORY:  Headache, new or worsening (Age >= 50y);    TECHNIQUE:  Axial images of the head were obtained without IV contrast administration.  Coronal and sagittal reconstructions were provided.  Three dimensional and MIP images were obtained and evaluated.  Total DLP was 1200.07 mGy-cm. Dose lowering technique and automated exposure control were utilized for this exam.    COMPARISON:  CT of the head 07/22/2023.    FINDINGS:  There is normal brain formation.  There is diffuse cerebral atrophy.  There is no hemorrhage, hydrocephalus, or  midline shift.  There is no cytotoxic or vasogenic edema.  There is no intra or extra-axial fluid collection.  There is no herniation.    The calvarium is intact.  There is no fracture.  The bilateral orbits are normal.  The paranasal sinuses and mastoid air cells are normally developed and free of disease.                                       Medications   naproxen tablet 500 mg (500 mg Oral Given 6/1/24 1250)     Medical Decision Making  Patient presents with left sided headache with radiation into neck for 3 days. No recent injury or trauma. No gross deficits on exam.     The ddx includes but is not limited to migraine headache, tension headache, cluster headache, SAH, subdural hematoma, epidural hematoma,     Amount and/or Complexity of Data Reviewed  Labs: ordered. Decision-making details documented in ED Course.  Radiology: ordered. Decision-making details documented in ED Course.    Risk  Prescription drug management.  Risk Details: Given strict ED return precautions. I have spoken with the patient and/or caregivers. I have explained the patient's condition, diagnoses and treatment plan based on the information available to me at this time. I have answered the patient's and/or caregiver's questions and addressed any concerns. The patient and/or caregivers have as good an understanding of the patient's diagnosis, condition and treatment plan as can be expected at this point. The vital signs have been stable. The patient's condition is stable and appropriate for discharge from the emergency department.      The patient will pursue further outpatient evaluation with the primary care physician or other designated or consulting physician as outlined in the discharge instructions. The patient and/or caregivers are agreeable to this plan of care and follow-up instructions have been explained in detail. The patient and/or caregivers have received these instructions in written format and have expressed an  understanding of the discharge instructions. The patient and/or caregivers are aware that any significant change in condition or worsening of symptoms should prompt an immediate return to this or the closest emergency department or a call to 911.               ED Course as of 06/01/24 1342   Sat Jun 01, 2024   1333 WBC(!): 3.43 [SA]   1333 Hemoglobin: 14.2 [SA]   1333 Hematocrit: 45.7 [SA]   1333 Platelet Count: 255 [SA]   1333 Sodium: 143 [SA]   1333 Potassium: 4.4 [SA]   1333 CO2: 27 [SA]   1333 Glucose: 91 [SA]   1333 BUN: 12.9 [SA]   1333 Creatinine(!): 1.30 [SA]   1333 CT Head Without Contrast [SA]   1340 Headache resolved. Patient feeling better. Discussed results with him. Advised him to f/u with his pcp.  [SA]      ED Course User Index  [SA] Jody Felix PA                           Clinical Impression:  Final diagnoses:  [R51.9] Left-sided headache (Primary)  [M54.2] Neck pain on left side          ED Disposition Condition    Discharge Stable          ED Prescriptions       Medication Sig Dispense Start Date End Date Auth. Provider    naproxen (NAPROSYN) 250 MG tablet Take 1 tablet (250 mg total) by mouth 2 (two) times daily as needed (headache). 14 tablet 6/1/2024 -- Jody Felix PA          Follow-up Information       Follow up With Specialties Details Why Contact Info    [facility] - Emergency Dept Emergency Medicine  If symptoms worsen return to ED immediately [address] [facility] [address] [phone]    Chucky Logan MD Family Medicine In 2 days  [address] [address]  [phone]               Jody Felix PA  06/01/24 7385

## 2024-07-27 ENCOUNTER — HOSPITAL ENCOUNTER (EMERGENCY)
Facility: HOSPITAL | Age: 84
Discharge: HOME | End: 2024-07-27
Attending: STUDENT IN AN ORGANIZED HEALTH CARE EDUCATION/TRAINING PROGRAM
Payer: MEDICARE

## 2024-07-27 VITALS
SYSTOLIC BLOOD PRESSURE: 149 MMHG | DIASTOLIC BLOOD PRESSURE: 95 MMHG | TEMPERATURE: 98 F | OXYGEN SATURATION: 100 % | HEART RATE: 57 BPM | RESPIRATION RATE: 17 BRPM | WEIGHT: 157 LBS | BODY MASS INDEX: 23.79 KG/M2 | HEIGHT: 68 IN

## 2024-07-27 DIAGNOSIS — R07.9 CHEST PAIN: ICD-10-CM

## 2024-07-27 DIAGNOSIS — J18.9 PNEUMONIA OF LEFT LOWER LOBE DUE TO INFECTIOUS ORGANISM: Primary | ICD-10-CM

## 2024-07-27 LAB
ALBUMIN SERPL-MCNC: 3.9 G/DL (ref 3.4–4.8)
ALBUMIN/GLOB SERPL: 1.3 RATIO (ref 1.1–2)
ALP SERPL-CCNC: 131 UNIT/L (ref 40–150)
ALT SERPL-CCNC: 12 UNIT/L (ref 0–55)
ANION GAP SERPL CALC-SCNC: 9 MEQ/L
AST SERPL-CCNC: 17 UNIT/L (ref 5–34)
BASOPHILS # BLD AUTO: 0.01 X10(3)/MCL
BASOPHILS NFR BLD AUTO: 0.3 %
BILIRUB SERPL-MCNC: 0.8 MG/DL
BUN SERPL-MCNC: 18.6 MG/DL (ref 8.4–25.7)
CALCIUM SERPL-MCNC: 9.6 MG/DL (ref 8.8–10)
CHLORIDE SERPL-SCNC: 109 MMOL/L (ref 98–107)
CO2 SERPL-SCNC: 24 MMOL/L (ref 23–31)
CREAT SERPL-MCNC: 1.34 MG/DL (ref 0.73–1.18)
CREAT/UREA NIT SERPL: 14
EOSINOPHIL # BLD AUTO: 0.11 X10(3)/MCL (ref 0–0.9)
EOSINOPHIL NFR BLD AUTO: 3.3 %
ERYTHROCYTE [DISTWIDTH] IN BLOOD BY AUTOMATED COUNT: 13.2 % (ref 11.5–17)
FLUAV AG UPPER RESP QL IA.RAPID: NOT DETECTED
FLUBV AG UPPER RESP QL IA.RAPID: NOT DETECTED
GFR SERPLBLD CREATININE-BSD FMLA CKD-EPI: 52 ML/MIN/1.73/M2
GLOBULIN SER-MCNC: 3.1 GM/DL (ref 2.4–3.5)
GLUCOSE SERPL-MCNC: 91 MG/DL (ref 82–115)
HCT VFR BLD AUTO: 45.4 % (ref 42–52)
HGB BLD-MCNC: 14.4 G/DL (ref 14–18)
HOLD SPECIMEN: NORMAL
IMM GRANULOCYTES # BLD AUTO: 0.01 X10(3)/MCL (ref 0–0.04)
IMM GRANULOCYTES NFR BLD AUTO: 0.3 %
LYMPHOCYTES # BLD AUTO: 1.16 X10(3)/MCL (ref 0.6–4.6)
LYMPHOCYTES NFR BLD AUTO: 34.7 %
MAGNESIUM SERPL-MCNC: 2.4 MG/DL (ref 1.6–2.6)
MCH RBC QN AUTO: 28.8 PG (ref 27–31)
MCHC RBC AUTO-ENTMCNC: 31.7 G/DL (ref 33–36)
MCV RBC AUTO: 90.8 FL (ref 80–94)
MONOCYTES # BLD AUTO: 0.25 X10(3)/MCL (ref 0.1–1.3)
MONOCYTES NFR BLD AUTO: 7.5 %
NEUTROPHILS # BLD AUTO: 1.8 X10(3)/MCL (ref 2.1–9.2)
NEUTROPHILS NFR BLD AUTO: 53.9 %
NRBC BLD AUTO-RTO: 0 %
PLATELET # BLD AUTO: 181 X10(3)/MCL (ref 130–400)
PMV BLD AUTO: 10.3 FL (ref 7.4–10.4)
POTASSIUM SERPL-SCNC: 4.6 MMOL/L (ref 3.5–5.1)
PROT SERPL-MCNC: 7 GM/DL (ref 5.8–7.6)
RBC # BLD AUTO: 5 X10(6)/MCL (ref 4.7–6.1)
SARS-COV-2 RNA RESP QL NAA+PROBE: NOT DETECTED
SODIUM SERPL-SCNC: 142 MMOL/L (ref 136–145)
TROPONIN I SERPL-MCNC: 0.02 NG/ML (ref 0–0.04)
WBC # BLD AUTO: 3.34 X10(3)/MCL (ref 4.5–11.5)

## 2024-07-27 PROCEDURE — 0240U COVID/FLU A&B PCR: CPT | Performed by: STUDENT IN AN ORGANIZED HEALTH CARE EDUCATION/TRAINING PROGRAM

## 2024-07-27 PROCEDURE — 85025 COMPLETE CBC W/AUTO DIFF WBC: CPT | Performed by: STUDENT IN AN ORGANIZED HEALTH CARE EDUCATION/TRAINING PROGRAM

## 2024-07-27 PROCEDURE — 99285 EMERGENCY DEPT VISIT HI MDM: CPT | Mod: 25

## 2024-07-27 PROCEDURE — 80053 COMPREHEN METABOLIC PANEL: CPT | Performed by: STUDENT IN AN ORGANIZED HEALTH CARE EDUCATION/TRAINING PROGRAM

## 2024-07-27 PROCEDURE — 93005 ELECTROCARDIOGRAM TRACING: CPT

## 2024-07-27 PROCEDURE — 83735 ASSAY OF MAGNESIUM: CPT | Performed by: STUDENT IN AN ORGANIZED HEALTH CARE EDUCATION/TRAINING PROGRAM

## 2024-07-27 PROCEDURE — 84484 ASSAY OF TROPONIN QUANT: CPT | Performed by: STUDENT IN AN ORGANIZED HEALTH CARE EDUCATION/TRAINING PROGRAM

## 2024-07-27 RX ORDER — AZITHROMYCIN 250 MG/1
TABLET, FILM COATED ORAL
Qty: 6 TABLET | Refills: 0 | Status: SHIPPED | OUTPATIENT
Start: 2024-07-27 | End: 2024-08-01

## 2024-07-27 NOTE — ED PROVIDER NOTES
Encounter Date: 7/27/2024       History     Chief Complaint   Patient presents with    Fatigue     Pt reports left sided neck pain, generalized weakness, intermittent midline chest pain x2 days. No injury.      Patient presents to the emergency department complaining of left-sided neck pain and left-sided chest pain.  He reports his symptoms have been going on for the last 2 days.  Mostly happen when he is sitting down.  Not worse on exertion.  He will get a brief aching pain from his left neck to the left side of his chest.  It will last less than 10 minutes.  no shortness of breath.  No dizziness or diaphoresis.  No cough, congestion, or fevers.  No pain or numbness in his left arm.    The history is provided by the patient.     Review of patient's allergies indicates:  No Known Allergies  Past Medical History:   Diagnosis Date    Cancer     HLD (hyperlipidemia)     Hypertension      Past Surgical History:   Procedure Laterality Date    ABDOMINAL SURGERY      LEFT HEART CATHETERIZATION Left 11/11/2022    Procedure: CATHETERIZATION, HEART, LEFT;  Surgeon: Colby Cook MD;  Location: [facility] CATH LAB;  Service: Cardiology;  Laterality: Left;  LHC +/- PCI    NEPHRECTOMY Right      No family history on file.  Social History     Tobacco Use    Smoking status: Never    Smokeless tobacco: Never   Substance Use Topics    Alcohol use: Never    Drug use: Never     Review of Systems   Constitutional:  Negative for chills and fever.   HENT:  Negative for congestion and sore throat.    Respiratory:  Negative for cough and shortness of breath.    Cardiovascular:  Positive for chest pain. Negative for palpitations.   Gastrointestinal:  Negative for abdominal pain and nausea.   Genitourinary:  Negative for dysuria and hematuria.   Musculoskeletal:  Positive for neck pain. Negative for arthralgias and myalgias.   Neurological:  Negative for dizziness and weakness.       Physical Exam     Initial Vitals [07/27/24 1027]   BP Pulse  Resp Temp SpO2   138/88 73 18 97.9 °F (36.6 °C) 97 %      MAP       --         Physical Exam    Nursing note and vitals reviewed.  Constitutional: He appears well-developed and well-nourished.   HENT:   Head: Normocephalic and atraumatic.   Eyes: Conjunctivae are normal. Pupils are equal, round, and reactive to light.   Neck: Neck supple.   No midline or paraspinal tenderness present   Normal range of motion.  Cardiovascular:  Normal rate, regular rhythm and normal heart sounds.           Pulmonary/Chest: Breath sounds normal. No respiratory distress. He has no wheezes. He has no rhonchi. He has no rales. He exhibits no tenderness.   Abdominal: Abdomen is soft. There is no abdominal tenderness.   Musculoskeletal:         General: No tenderness or edema. Normal range of motion.      Cervical back: Normal range of motion and neck supple.     Neurological: He is alert and oriented to person, place, and time.   Skin: Skin is warm and dry.         ED Course   Procedures  Labs Reviewed   COMPREHENSIVE METABOLIC PANEL - Abnormal       Result Value    Sodium 142      Potassium 4.6      Chloride 109 (*)     CO2 24      Glucose 91      Blood Urea Nitrogen 18.6      Creatinine 1.34 (*)     Calcium 9.6      Protein Total 7.0      Albumin 3.9      Globulin 3.1      Albumin/Globulin Ratio 1.3      Bilirubin Total 0.8            ALT 12      AST 17      eGFR 52      Anion Gap 9.0      BUN/Creatinine Ratio 14     CBC WITH DIFFERENTIAL - Abnormal    WBC 3.34 (*)     RBC 5.00      Hgb 14.4      Hct 45.4      MCV 90.8      MCH 28.8      MCHC 31.7 (*)     RDW 13.2      Platelet 181      MPV 10.3      Neut % 53.9      Lymph % 34.7      Mono % 7.5      Eos % 3.3      Basophil % 0.3      Lymph # 1.16      Neut # 1.80 (*)     Mono # 0.25      Eos # 0.11      Baso # 0.01      IG# 0.01      IG% 0.3      NRBC% 0.0     MAGNESIUM - Normal    Magnesium Level 2.40     TROPONIN I - Normal    Troponin-I 0.021     COVID/FLU A&B PCR - Normal     Influenza A PCR Not Detected      Influenza B PCR Not Detected      SARS-CoV-2 PCR Not Detected      Narrative:     The Xpert Xpress SARS-CoV-2/FLU/RSV plus is a rapid, multiplexed real-time PCR test intended for the simultaneous qualitative detection and differentiation of SARS-CoV-2, Influenza A, Influenza B, and respiratory syncytial virus (RSV) viral RNA in either nasopharyngeal swab or nasal swab specimens.         CBC W/ AUTO DIFFERENTIAL    Narrative:     The following orders were created for panel order CBC auto differential.  Procedure                               Abnormality         Status                     ---------                               -----------         ------                     CBC with Differential[[id number]]       Abnormal            Final result                 Please view results for these tests on the individual orders.   EXTRA TUBES    Narrative:     The following orders were created for panel order EXTRA TUBES.  Procedure                               Abnormality         Status                     ---------                               -----------         ------                     Light Blue Top Hold[[id number]]                             Final result               Red Top Hold[[id number]]                                    Final result               Gold Top Hold[[id number]]                                   Final result                 Please view results for these tests on the individual orders.   LIGHT BLUE TOP HOLD    Extra Tube Hold for add-ons.     RED TOP HOLD    Extra Tube Hold for add-ons.     GOLD TOP HOLD    Extra Tube Hold for add-ons.       EKG Readings: (Independently Interpreted)   Initial Reading: No STEMI. Rhythm: Normal Sinus Rhythm. Heart Rate: 69. Ectopy: No Ectopy. Conduction: Normal. Axis: Normal.     ECG Results              EKG 12-lead (Chest Pain) Age >30 (In process)        Collection Time Result Time QRS Duration [facility] QTC Calculation    07/27/24  10:34:29 07/27/24 10:46:46 94 405                     In process by Interface, Lab In [facility] (07/27/24 10:46:56)                   Narrative:    Test Reason : R07.9,    Vent. Rate : 069 BPM     Atrial Rate : 069 BPM     P-R Int : 152 ms          QRS Dur : 094 ms      QT Int : 378 ms       P-R-T Axes : 060 072 052 degrees     QTc Int : 405 ms    Normal sinus rhythm  Normal ECG  When compared with ECG of 27-JAN-2024 11:25,  No significant change was found    Referred By: [email or web address]   SELF           Confirmed By:                                   Imaging Results              X-Ray Chest AP Portable (Final result)  Result time 07/27/24 11:22:00      Final result by Angie Torres MD (07/27/24 11:22:00)                   Impression:      Increased interstitial markings in the left lung base, may be infectious or inflammatory.      Electronically signed by: Angie Torres  Date:    07/27/2024  Time:    11:22               Narrative:    EXAMINATION:  XR CHEST AP PORTABLE    CLINICAL HISTORY:  chest pain;    COMPARISON:  Chest x-ray dated 12/29/2023    FINDINGS:  The heart is normal in size.  There are increased interstitial markings in the left lung base.  There is no pleural effusion or visible pneumothorax.                                       Medications - No data to display  Medical Decision Making  Vital signs are stable, patient was currently symptom free.  EKG without concerning acute changes.  CBC, CMP, troponin reassuring.  COVID and flu swabs were negative.  Chest x-ray with a question of the lower lobe early infiltrate.  Possibly the cause of his symptoms.  Otherwise stable, will start on a Z-Reji.  Follow up closely with the primary care physician, return precautions were given.    Amount and/or Complexity of Data Reviewed  Labs: ordered. Decision-making details documented in ED Course.  Radiology: ordered. Decision-making details documented in ED Course.  ECG/medicine tests: ordered and independent  interpretation performed. Decision-making details documented in ED Course.    Risk  Prescription drug management.                                      Clinical Impression:  Final diagnoses:  [R07.9] Chest pain  [J18.9] Pneumonia of left lower lobe due to infectious organism (Primary)          ED Disposition Condition    Discharge Stable          ED Prescriptions       Medication Sig Dispense Start Date End Date Auth. Provider    azithromycin (Z-NOLAN) 250 MG tablet Take 2 tablets by mouth on day 1; Take 1 tablet by mouth on days 2-5 6 tablet 7/27/2024 8/1/2024 Ravi Aguilar MD          Follow-up Information       Follow up With Specialties Details Why Contact Info    [facility] Emergency Medicine Go to  If symptoms worsen [address] [facility] [address] [phone]    Chucky Logan MD Family Medicine Call  For ED follow up [address] [facility] [address]  [phone]               Ravi Aguilar MD  07/28/24 1736

## 2024-07-29 LAB
OHS QRS DURATION: 94 MS
OHS QTC CALCULATION: 405 MS

## 2025-02-15 ENCOUNTER — HOSPITAL ENCOUNTER (EMERGENCY)
Facility: HOSPITAL | Age: 85
Discharge: HOME | End: 2025-02-15
Attending: INTERNAL MEDICINE
Payer: MEDICARE

## 2025-02-15 VITALS
OXYGEN SATURATION: 91 % | RESPIRATION RATE: 20 BRPM | TEMPERATURE: 99 F | DIASTOLIC BLOOD PRESSURE: 76 MMHG | HEART RATE: 78 BPM | SYSTOLIC BLOOD PRESSURE: 128 MMHG | WEIGHT: 162.13 LBS | BODY MASS INDEX: 24.57 KG/M2 | HEIGHT: 68 IN

## 2025-02-15 DIAGNOSIS — R06.02 SHORTNESS OF BREATH: ICD-10-CM

## 2025-02-15 DIAGNOSIS — R07.9 CHEST PAIN: Primary | ICD-10-CM

## 2025-02-15 LAB
ALBUMIN SERPL-MCNC: 3.4 G/DL (ref 3.4–4.8)
ALBUMIN/GLOB SERPL: 1.1 RATIO (ref 1.1–2)
ALP SERPL-CCNC: 99 UNIT/L (ref 40–150)
ALT SERPL-CCNC: 13 UNIT/L (ref 0–55)
ANION GAP SERPL CALC-SCNC: 7 MEQ/L
AST SERPL-CCNC: 20 UNIT/L (ref 5–34)
BASOPHILS # BLD AUTO: 0.01 X10(3)/MCL
BASOPHILS NFR BLD AUTO: 0.4 %
BILIRUB SERPL-MCNC: 0.7 MG/DL
BNP BLD-MCNC: 33.2 PG/ML
BUN SERPL-MCNC: 14 MG/DL (ref 8.4–25.7)
CALCIUM SERPL-MCNC: 8.5 MG/DL (ref 8.8–10)
CHLORIDE SERPL-SCNC: 115 MMOL/L (ref 98–107)
CO2 SERPL-SCNC: 21 MMOL/L (ref 23–31)
CREAT SERPL-MCNC: 0.99 MG/DL (ref 0.72–1.25)
CREAT/UREA NIT SERPL: 14
EOSINOPHIL # BLD AUTO: 0.08 X10(3)/MCL (ref 0–0.9)
EOSINOPHIL NFR BLD AUTO: 2.9 %
ERYTHROCYTE [DISTWIDTH] IN BLOOD BY AUTOMATED COUNT: 13.3 % (ref 11.5–17)
GFR SERPLBLD CREATININE-BSD FMLA CKD-EPI: >60 ML/MIN/1.73/M2
GLOBULIN SER-MCNC: 3.2 GM/DL (ref 2.4–3.5)
GLUCOSE SERPL-MCNC: 116 MG/DL (ref 82–115)
HCT VFR BLD AUTO: 35.7 % (ref 42–52)
HGB BLD-MCNC: 11.4 G/DL (ref 14–18)
HOLD SPECIMEN: NORMAL
HOLD SPECIMEN: NORMAL
IMM GRANULOCYTES # BLD AUTO: 0.01 X10(3)/MCL (ref 0–0.04)
IMM GRANULOCYTES NFR BLD AUTO: 0.4 %
LYMPHOCYTES # BLD AUTO: 0.65 X10(3)/MCL (ref 0.6–4.6)
LYMPHOCYTES NFR BLD AUTO: 23.2 %
MCH RBC QN AUTO: 29.1 PG (ref 27–31)
MCHC RBC AUTO-ENTMCNC: 31.9 G/DL (ref 33–36)
MCV RBC AUTO: 91.1 FL (ref 80–94)
MONOCYTES # BLD AUTO: 0.29 X10(3)/MCL (ref 0.1–1.3)
MONOCYTES NFR BLD AUTO: 10.4 %
NEUTROPHILS # BLD AUTO: 1.76 X10(3)/MCL (ref 2.1–9.2)
NEUTROPHILS NFR BLD AUTO: 62.7 %
NRBC BLD AUTO-RTO: 0 %
PLATELET # BLD AUTO: 202 X10(3)/MCL (ref 130–400)
PMV BLD AUTO: 9.7 FL (ref 7.4–10.4)
POTASSIUM SERPL-SCNC: 3.6 MMOL/L (ref 3.5–5.1)
PROT SERPL-MCNC: 6.6 GM/DL (ref 5.8–7.6)
RBC # BLD AUTO: 3.92 X10(6)/MCL (ref 4.7–6.1)
SODIUM SERPL-SCNC: 143 MMOL/L (ref 136–145)
TROPONIN I SERPL-MCNC: 0.02 NG/ML (ref 0–0.04)
WBC # BLD AUTO: 2.8 X10(3)/MCL (ref 4.5–11.5)

## 2025-02-15 PROCEDURE — 80053 COMPREHEN METABOLIC PANEL: CPT | Performed by: INTERNAL MEDICINE

## 2025-02-15 PROCEDURE — 93005 ELECTROCARDIOGRAM TRACING: CPT

## 2025-02-15 PROCEDURE — 83880 ASSAY OF NATRIURETIC PEPTIDE: CPT | Performed by: INTERNAL MEDICINE

## 2025-02-15 PROCEDURE — 84484 ASSAY OF TROPONIN QUANT: CPT | Performed by: INTERNAL MEDICINE

## 2025-02-15 PROCEDURE — 85025 COMPLETE CBC W/AUTO DIFF WBC: CPT | Performed by: INTERNAL MEDICINE

## 2025-02-15 PROCEDURE — 99285 EMERGENCY DEPT VISIT HI MDM: CPT | Mod: 25

## 2025-02-15 NOTE — ED PROVIDER NOTES
Encounter Date: 2/15/2025       History     Chief Complaint   Patient presents with    Chest Pain     Left sided chest pain with SOB that started at 0930 am this morning. EKG done.       Norma Roman 84 y.o. male with pmhx of HLD, HTN presents to the ED with new onset CP and SOB this morning around 9AM. Symptoms have resolved on arrival to ED. Denies history of cardiac disease. Denies any headache, n/v, CP, SOB, abdominal pain, dysuria.     Per chart review, LHC performed on 11/2022 with findings of Hypertension with no significant coronary artery disease. Sluggish flow within the RCA. Elevated LVEDP           Review of patient's allergies indicates:  No Known Allergies  Past Medical History:   Diagnosis Date    Cancer     HLD (hyperlipidemia)     Hypertension      Past Surgical History:   Procedure Laterality Date    ABDOMINAL SURGERY      LEFT HEART CATHETERIZATION Left 11/11/2022    Procedure: CATHETERIZATION, HEART, LEFT;  Surgeon: Colby Cook MD;  Location: [facility] CATH LAB;  Service: Cardiology;  Laterality: Left;  LHC +/- PCI    NEPHRECTOMY Right      No family history on file.  Social History[1]  Review of Systems   Constitutional:  Negative for appetite change and fever.   Eyes:  Negative for visual disturbance.   Respiratory:  Negative for cough and shortness of breath.    Cardiovascular:  Negative for chest pain, palpitations and leg swelling.   Gastrointestinal:  Negative for abdominal pain, nausea and vomiting.   Genitourinary:  Negative for dysuria.   Neurological:  Negative for dizziness and headaches.       Physical Exam     Initial Vitals [02/15/25 1239]   BP Pulse Resp Temp SpO2   (!) 169/79 78 18 98.6 °F (37 °C) 96 %      MAP       --         Physical Exam    Nursing note and vitals reviewed.  Constitutional: He appears well-developed and well-nourished. No distress.   HENT:   Head: Normocephalic and atraumatic.   Eyes: EOM are normal. Pupils are equal, round, and reactive to light.   Neck: No  JVD present.   Cardiovascular:  Normal rate, regular rhythm and normal heart sounds.           No murmur heard.  Pulmonary/Chest: Breath sounds normal. No respiratory distress. He has no wheezes. He has no rales.   Abdominal: Abdomen is soft. Bowel sounds are normal. He exhibits no distension. There is no abdominal tenderness.   Musculoskeletal:         General: No tenderness or edema.      Comments: Absent bilateral Lower extremity edema       Lymphadenopathy:     He has no cervical adenopathy.   Neurological: He is alert. GCS score is 15. GCS eye subscore is 4. GCS verbal subscore is 5. GCS motor subscore is 6.   Skin: Skin is warm and dry. Capillary refill takes less than 2 seconds. No rash noted. No erythema.         ED Course   Procedures  Labs Reviewed   COMPREHENSIVE METABOLIC PANEL - Abnormal       Result Value    Sodium 143      Potassium 3.6      Chloride 115 (*)     CO2 21 (*)     Glucose 116 (*)     Blood Urea Nitrogen 14.0      Creatinine 0.99      Calcium 8.5 (*)     Protein Total 6.6      Albumin 3.4      Globulin 3.2      Albumin/Globulin Ratio 1.1      Bilirubin Total 0.7      ALP 99      ALT 13      AST 20      eGFR >60      Anion Gap 7.0      BUN/Creatinine Ratio 14     CBC WITH DIFFERENTIAL - Abnormal    WBC 2.80 (*)     RBC 3.92 (*)     Hgb 11.4 (*)     Hct 35.7 (*)     MCV 91.1      MCH 29.1      MCHC 31.9 (*)     RDW 13.3      Platelet 202      MPV 9.7      Neut % 62.7      Lymph % 23.2      Mono % 10.4      Eos % 2.9      Basophil % 0.4      Imm Grans % 0.4      Neut # 1.76 (*)     Lymph # 0.65      Mono # 0.29      Eos # 0.08      Baso # 0.01      Imm Gran # 0.01      NRBC% 0.0     TROPONIN I - Normal    Troponin-I 0.019     B-TYPE NATRIURETIC PEPTIDE - Normal    Natriuretic Peptide 33.2     CBC W/ AUTO DIFFERENTIAL    Narrative:     The following orders were created for panel order CBC auto differential.  Procedure                               Abnormality         Status                      ---------                               -----------         ------                     CBC with Differential[[id number]]       Abnormal            Final result                 Please view results for these tests on the individual orders.   EXTRA TUBES    Narrative:     The following orders were created for panel order EXTRA TUBES.  Procedure                               Abnormality         Status                     ---------                               -----------         ------                     Light Blue Top Hold[[id number]]                             In process                 Gold Top Hold[[id number]]                                   In process                   Please view results for these tests on the individual orders.   LIGHT BLUE TOP HOLD   GOLD TOP HOLD     EKG Readings: (Independently Interpreted)   Initial Reading: No STEMI. Previous EKG: Compared with most recent EKG Rhythm: Normal Sinus Rhythm. Heart Rate: 71. Axis: Normal.     ECG Results              EKG 12-lead (In process)        Collection Time Result Time QRS Duration [facility] QTC Calculation    02/15/25 12:41:26 02/15/25 12:55:51 96 421                     In process by Interface, Lab In [facility] (02/15/25 12:55:59)                   Narrative:    Test Reason : R07.9,    Vent. Rate :  71 BPM     Atrial Rate :  71 BPM     P-R Int : 152 ms          QRS Dur :  96 ms      QT Int : 388 ms       P-R-T Axes :  70  61  54 degrees    QTcB Int : 421 ms    Normal sinus rhythm  Normal ECG  When compared with ECG of 27-Jul-2024 10:34,  No significant change was found    Referred By:            Confirmed By:                                   Imaging Results              X-Ray Chest PA And Lateral (Final result)  Result time 02/15/25 13:00:55      Final result by Lukasz Ahuja MD (02/15/25 13:00:55)                   Impression:      No abnormality seen      Electronically signed by: Biju Ahuja  Date:    02/15/2025  Time:    13:00                Narrative:    EXAMINATION:  XR CHEST PA AND LATERAL    CLINICAL HISTORY:  Chest Pain;    TECHNIQUE:  PA and lateral view of the chest was performed.    COMPARISON:  07/27/2024    FINDINGS:  The lungs are clear.  The heart is normal appearance.  The pulmonary vascularity is unremarkable.  Aorta appears grossly unremarkable.  No pleural effusions are seen.  Bones and joints show no acute abnormality.                                       Medications - No data to display  Medical Decision Making  84 y.o. male presents to the ED with onset of CP and SOB this morning. CP and SOB resolved on admission to ED. Denies n/v, headache, dizziness, vision changes, CP, SOB, abdominal pain. Troponin normal, BNP normal, CBC/CMP with no acute abnormalities. EKG reviewed, normal sinus rhythm with rate of 71bpm no NSTEMI. No acute cardiac abnormalities on exam/labs/imaging. Chest x-ray with no acute cardiopulmonary abnormalities. Patient stable for discharge, ED return precautions discussed.     Amount and/or Complexity of Data Reviewed  Labs: ordered. Decision-making details documented in ED Course.  Radiology: ordered and independent interpretation performed. Decision-making details documented in ED Course.  ECG/medicine tests: ordered and independent interpretation performed. Decision-making details documented in ED Course.      Additional MDM:   Differential Diagnosis:   MI, PE, Pneumonia, viral URI            Attending Attestation:   Physician Attestation Statement for Resident:  As the supervising MD   Physician Attestation Statement: I have personally seen and examined this patient.   I agree with the above history.  -:   As the supervising MD I agree with the above PE.     As the supervising MD I agree with the above treatment, course, plan, and disposition.    I have reviewed and agree with the residents interpretation of the following: lab data, x-rays and EKG.                                        Clinical  Impression:  Final diagnoses:  [R07.9] Chest pain (Primary)  [R06.02] Shortness of breath          ED Disposition Condition    Discharge Stable          ED Prescriptions    None       Follow-up Information       Follow up With Specialties Details Why Contact Info    Chucky Logan MD Family Medicine Schedule an appointment as soon as possible for a visit in 1 week  [address] [facility] [address]  [phone]      [facility] Emergency Medicine  As needed [address] [facility] [address] [phone]             Kendell Quick MD  Resident  02/15/25 1417         [1]   Social History  Tobacco Use    Smoking status: Never    Smokeless tobacco: Never   Substance Use Topics    Alcohol use: Never    Drug use: Never        Elvin Prado MD  02/16/25 9921

## 2025-02-17 LAB
OHS QRS DURATION: 96 MS
OHS QTC CALCULATION: 421 MS

## 2025-02-20 ENCOUNTER — HOSPITAL ENCOUNTER (EMERGENCY)
Facility: HOSPITAL | Age: 85
Discharge: HOME | End: 2025-02-20
Attending: INTERNAL MEDICINE
Payer: MEDICARE

## 2025-02-20 VITALS
HEART RATE: 60 BPM | RESPIRATION RATE: 19 BRPM | BODY MASS INDEX: 24.47 KG/M2 | OXYGEN SATURATION: 98 % | WEIGHT: 160.94 LBS | DIASTOLIC BLOOD PRESSURE: 73 MMHG | TEMPERATURE: 98 F | SYSTOLIC BLOOD PRESSURE: 124 MMHG

## 2025-02-20 DIAGNOSIS — E87.6 HYPOKALEMIA: ICD-10-CM

## 2025-02-20 DIAGNOSIS — R53.1 GENERALIZED WEAKNESS: Primary | ICD-10-CM

## 2025-02-20 DIAGNOSIS — R53.1 WEAKNESS: ICD-10-CM

## 2025-02-20 LAB
ALBUMIN SERPL-MCNC: 3.6 G/DL (ref 3.4–4.8)
ALBUMIN/GLOB SERPL: 1.2 RATIO (ref 1.1–2)
ALP SERPL-CCNC: 93 UNIT/L (ref 40–150)
ALT SERPL-CCNC: 13 UNIT/L (ref 0–55)
ANION GAP SERPL CALC-SCNC: 4 MEQ/L
AST SERPL-CCNC: 20 UNIT/L (ref 5–34)
BACTERIA #/AREA URNS AUTO: ABNORMAL /HPF
BASOPHILS # BLD AUTO: 0.01 X10(3)/MCL
BASOPHILS NFR BLD AUTO: 0.2 %
BILIRUB SERPL-MCNC: 0.5 MG/DL
BILIRUB UR QL STRIP.AUTO: NEGATIVE
BUN SERPL-MCNC: 24 MG/DL (ref 8.4–25.7)
CALCIUM SERPL-MCNC: 8.9 MG/DL (ref 8.8–10)
CHLORIDE SERPL-SCNC: 116 MMOL/L (ref 98–107)
CLARITY UR: CLEAR
CO2 SERPL-SCNC: 25 MMOL/L (ref 23–31)
COLOR UR AUTO: ABNORMAL
CREAT SERPL-MCNC: 1.22 MG/DL (ref 0.72–1.25)
CREAT/UREA NIT SERPL: 20
EOSINOPHIL # BLD AUTO: 0.06 X10(3)/MCL (ref 0–0.9)
EOSINOPHIL NFR BLD AUTO: 1.1 %
ERYTHROCYTE [DISTWIDTH] IN BLOOD BY AUTOMATED COUNT: 13.4 % (ref 11.5–17)
GFR SERPLBLD CREATININE-BSD FMLA CKD-EPI: 58 ML/MIN/1.73/M2
GLOBULIN SER-MCNC: 3.1 GM/DL (ref 2.4–3.5)
GLUCOSE SERPL-MCNC: 93 MG/DL (ref 82–115)
GLUCOSE UR QL STRIP: NORMAL
HCT VFR BLD AUTO: 36.2 % (ref 42–52)
HGB BLD-MCNC: 11.4 G/DL (ref 14–18)
HGB UR QL STRIP: NEGATIVE
HOLD SPECIMEN: NORMAL
HYALINE CASTS #/AREA URNS LPF: ABNORMAL /LPF
IMM GRANULOCYTES # BLD AUTO: 0.02 X10(3)/MCL (ref 0–0.04)
IMM GRANULOCYTES NFR BLD AUTO: 0.4 %
KETONES UR QL STRIP: NEGATIVE
LEUKOCYTE ESTERASE UR QL STRIP: NEGATIVE
LYMPHOCYTES # BLD AUTO: 0.64 X10(3)/MCL (ref 0.6–4.6)
LYMPHOCYTES NFR BLD AUTO: 11.9 %
MCH RBC QN AUTO: 28.9 PG (ref 27–31)
MCHC RBC AUTO-ENTMCNC: 31.5 G/DL (ref 33–36)
MCV RBC AUTO: 91.9 FL (ref 80–94)
MONOCYTES # BLD AUTO: 0.58 X10(3)/MCL (ref 0.1–1.3)
MONOCYTES NFR BLD AUTO: 10.8 %
MUCOUS THREADS URNS QL MICRO: ABNORMAL /LPF
NEUTROPHILS # BLD AUTO: 4.07 X10(3)/MCL (ref 2.1–9.2)
NEUTROPHILS NFR BLD AUTO: 75.6 %
NITRITE UR QL STRIP: NEGATIVE
NRBC BLD AUTO-RTO: 0 %
OHS QRS DURATION: 100 MS
OHS QTC CALCULATION: 394 MS
PH UR STRIP: 5.5 [PH]
PLATELET # BLD AUTO: 231 X10(3)/MCL (ref 130–400)
PMV BLD AUTO: 9.2 FL (ref 7.4–10.4)
POCT GLUCOSE: 99 MG/DL (ref 70–110)
POTASSIUM SERPL-SCNC: 3.3 MMOL/L (ref 3.5–5.1)
PROT SERPL-MCNC: 6.7 GM/DL (ref 5.8–7.6)
PROT UR QL STRIP: NEGATIVE
RBC # BLD AUTO: 3.94 X10(6)/MCL (ref 4.7–6.1)
RBC #/AREA URNS AUTO: ABNORMAL /HPF
SODIUM SERPL-SCNC: 145 MMOL/L (ref 136–145)
SP GR UR STRIP.AUTO: 1.02 (ref 1–1.03)
SQUAMOUS #/AREA URNS LPF: ABNORMAL /HPF
TROPONIN I SERPL-MCNC: 0.01 NG/ML (ref 0–0.04)
TSH SERPL-ACNC: 2.87 UIU/ML (ref 0.35–4.94)
UROBILINOGEN UR STRIP-ACNC: NORMAL
WBC # BLD AUTO: 5.38 X10(3)/MCL (ref 4.5–11.5)
WBC #/AREA URNS AUTO: ABNORMAL /HPF

## 2025-02-20 PROCEDURE — 85025 COMPLETE CBC W/AUTO DIFF WBC: CPT | Performed by: INTERNAL MEDICINE

## 2025-02-20 PROCEDURE — 81001 URINALYSIS AUTO W/SCOPE: CPT | Performed by: INTERNAL MEDICINE

## 2025-02-20 PROCEDURE — 84443 ASSAY THYROID STIM HORMONE: CPT | Performed by: INTERNAL MEDICINE

## 2025-02-20 PROCEDURE — 99284 EMERGENCY DEPT VISIT MOD MDM: CPT | Mod: 25

## 2025-02-20 PROCEDURE — 82962 GLUCOSE BLOOD TEST: CPT

## 2025-02-20 PROCEDURE — 80053 COMPREHEN METABOLIC PANEL: CPT | Performed by: INTERNAL MEDICINE

## 2025-02-20 PROCEDURE — 93005 ELECTROCARDIOGRAM TRACING: CPT

## 2025-02-20 PROCEDURE — 84484 ASSAY OF TROPONIN QUANT: CPT | Performed by: INTERNAL MEDICINE

## 2025-02-20 PROCEDURE — 25000003 PHARM REV CODE 250: Performed by: INTERNAL MEDICINE

## 2025-02-20 RX ADMIN — POTASSIUM BICARBONATE 50 MEQ: 977.5 TABLET, EFFERVESCENT ORAL at 12:02

## 2025-02-20 NOTE — ED PROVIDER NOTES
Encounter Date: 2/20/2025       History     Chief Complaint   Patient presents with    Fatigue     84 YR OLD PT W CO GEN. WEAKNESS/SOB SINCE YESTERDAY.  DENIES CP.  NO COUGH OR EDEMA .  CBG 99.  EKG OBTAINED.      Presents with fatigue. States just woke up tired, had breakfast. Denies pain, nausea, vomiting, diarrhea, SOB, dysuria, or sick contacts    The history is provided by the patient.     Review of patient's allergies indicates:  No Known Allergies  Past Medical History:   Diagnosis Date    Cancer     HLD (hyperlipidemia)     Hypertension      Past Surgical History:   Procedure Laterality Date    ABDOMINAL SURGERY      LEFT HEART CATHETERIZATION Left 11/11/2022    Procedure: CATHETERIZATION, HEART, LEFT;  Surgeon: Colby Cook MD;  Location: [facility] CATH LAB;  Service: Cardiology;  Laterality: Left;  LHC +/- PCI    NEPHRECTOMY Right      No family history on file.  Social History[1]  Review of Systems   Constitutional:  Positive for fatigue.   Neurological:  Positive for weakness.       Physical Exam     Initial Vitals [02/20/25 1058]   BP Pulse Resp Temp SpO2   (!) 152/79 (!) 59 18 97.9 °F (36.6 °C) (!) 94 %      MAP       --         Physical Exam    Nursing note and vitals reviewed.  Constitutional: He appears well-developed. No distress.   HENT:   Head: Normocephalic and atraumatic. Mouth/Throat: Oropharynx is clear and moist. No oropharyngeal exudate.   Eyes: Conjunctivae and EOM are normal. Pupils are equal, round, and reactive to light.   Neck: Neck supple. No thyromegaly present. No JVD present.   Normal range of motion.  Cardiovascular:  Normal rate, regular rhythm, normal heart sounds and intact distal pulses.           Pulmonary/Chest: Breath sounds normal. No respiratory distress.   Abdominal: Abdomen is soft. Bowel sounds are normal. He exhibits no distension. There is no abdominal tenderness. There is no rebound and no guarding.   Musculoskeletal:         General: No edema. Normal range of motion.       Cervical back: Normal range of motion and neck supple.     Neurological: He is alert and oriented to person, place, and time. He has normal strength. No cranial nerve deficit. GCS score is 15. GCS eye subscore is 4. GCS verbal subscore is 5. GCS motor subscore is 6.   Skin: Skin is warm and dry. No rash noted.   Psychiatric: His behavior is normal. Thought content normal.         ED Course   Procedures  Labs Reviewed   COMPREHENSIVE METABOLIC PANEL - Abnormal       Result Value    Sodium 145      Potassium 3.3 (*)     Chloride 116 (*)     CO2 25      Glucose 93      Blood Urea Nitrogen 24.0      Creatinine 1.22      Calcium 8.9      Protein Total 6.7      Albumin 3.6      Globulin 3.1      Albumin/Globulin Ratio 1.2      Bilirubin Total 0.5      ALP 93      ALT 13      AST 20      eGFR 58      Anion Gap 4.0      BUN/Creatinine Ratio 20     URINALYSIS, REFLEX TO URINE CULTURE - Abnormal    Color, UA Light-Yellow      Appearance, UA Clear      Specific Gravity, UA 1.022      pH, UA 5.5      Protein, UA Negative      Glucose, UA Normal      Ketones, UA Negative      Blood, UA Negative      Bilirubin, UA Negative      Urobilinogen, UA Normal      Nitrites, UA Negative      Leukocyte Esterase, UA Negative      RBC, UA 0-5      WBC, UA 0-5      Bacteria, UA None Seen      Squamous Epithelial Cells, UA None Seen      Mucous, UA Trace (*)     Hyaline Casts, UA None Seen     CBC WITH DIFFERENTIAL - Abnormal    WBC 5.38      RBC 3.94 (*)     Hgb 11.4 (*)     Hct 36.2 (*)     MCV 91.9      MCH 28.9      MCHC 31.5 (*)     RDW 13.4      Platelet 231      MPV 9.2      Neut % 75.6      Lymph % 11.9      Mono % 10.8      Eos % 1.1      Basophil % 0.2      Imm Grans % 0.4      Neut # 4.07      Lymph # 0.64      Mono # 0.58      Eos # 0.06      Baso # 0.01      Imm Gran # 0.02      NRBC% 0.0     TROPONIN I - Normal    Troponin-I 0.015     TSH - Normal    TSH 2.868     CBC W/ AUTO DIFFERENTIAL    Narrative:     The following  orders were created for panel order CBC auto differential.  Procedure                               Abnormality         Status                     ---------                               -----------         ------                     CBC with Differential[[id number]]       Abnormal            Final result                 Please view results for these tests on the individual orders.   EXTRA TUBES    Narrative:     The following orders were created for panel order EXTRA TUBES.  Procedure                               Abnormality         Status                     ---------                               -----------         ------                     Light Blue Top Hold[[id number]]                             In process                 Lavender Top Hold[[id number]]                               In process                 Gold Top Hold[[id number]]                                   In process                 Pink Top Hold[[id number]]                                   In process                   Please view results for these tests on the individual orders.   LIGHT BLUE TOP HOLD   LAVENDER TOP HOLD   GOLD TOP HOLD   PINK TOP HOLD   POCT GLUCOSE    POCT Glucose 99       EKG Readings: (Independently Interpreted)   Initial Reading: No STEMI. Rhythm: Normal Sinus Rhythm. Heart Rate: 60. Ectopy: No Ectopy. Conduction: Normal. ST Segments: Normal ST Segments. T Waves: Normal. Axis: Normal. Clinical Impression: Normal Sinus Rhythm     ECG Results              EKG 12-lead (Weakness) Age > 50 (In process)        Collection Time Result Time QRS Duration [facility] QTC Calculation    02/20/25 10:56:34 02/20/25 11:02:19 100 394                     In process by Interface, Lab In [facility] (02/20/25 11:02:25)                   Narrative:    Test Reason : R53.1,    Vent. Rate :  59 BPM     Atrial Rate :  59 BPM     P-R Int : 150 ms          QRS Dur : 100 ms      QT Int : 398 ms       P-R-T Axes :  74  71  64 degrees    QTcB Int : 394 ms    Sinus  bradycardia  Otherwise normal ECG  When compared with ECG of 15-Feb-2025 12:41,  No significant change was found    Referred By:            Confirmed By:                                   Imaging Results    None          Medications   potassium bicarbonate disintegrating tablet 50 mEq (has no administration in time range)     Medical Decision Making  Amount and/or Complexity of Data Reviewed  Labs: ordered. Decision-making details documented in ED Course.  ECG/medicine tests: ordered and independent interpretation performed. Decision-making details documented in ED Course.    Risk  Prescription drug management.      Additional MDM:   Differential Diagnosis:   Hypothyroidism, medication side effect, orthostatic weakness, kidney failure, stroke, among others                                      Clinical Impression:  Final diagnoses:  [R53.1] Weakness  [R53.1] Generalized weakness (Primary)  [E87.6] Hypokalemia          ED Disposition Condition    Discharge Stable          ED Prescriptions    None       Follow-up Information       Follow up With Specialties Details Why Contact Info    Chucky Logan MD Family Medicine In 2 weeks  [address] [facility] [address]  [phone]      [facility] Emergency Medicine  If symptoms worsen [address] [facility] [facility] [address] [phone]                 [1]   Social History  Tobacco Use    Smoking status: Never    Smokeless tobacco: Never   Substance Use Topics    Alcohol use: Never    Drug use: Never        Elvin Prado MD  02/20/25 9738

## 2025-06-14 ENCOUNTER — HOSPITAL ENCOUNTER (EMERGENCY)
Facility: HOSPITAL | Age: 85
Discharge: HOME | End: 2025-06-14
Attending: INTERNAL MEDICINE
Payer: MEDICARE

## 2025-06-14 VITALS
RESPIRATION RATE: 12 BRPM | SYSTOLIC BLOOD PRESSURE: 140 MMHG | OXYGEN SATURATION: 100 % | HEART RATE: 62 BPM | HEIGHT: 68 IN | BODY MASS INDEX: 24.07 KG/M2 | DIASTOLIC BLOOD PRESSURE: 76 MMHG | TEMPERATURE: 98 F | WEIGHT: 158.81 LBS

## 2025-06-14 DIAGNOSIS — I95.1 ORTHOSTATIC HYPOTENSION: ICD-10-CM

## 2025-06-14 DIAGNOSIS — R55 SYNCOPE: ICD-10-CM

## 2025-06-14 DIAGNOSIS — N17.9 AKI (ACUTE KIDNEY INJURY): Primary | ICD-10-CM

## 2025-06-14 DIAGNOSIS — E86.0 DEHYDRATION: ICD-10-CM

## 2025-06-14 LAB
ALBUMIN SERPL-MCNC: 4 G/DL (ref 3.4–4.8)
ALBUMIN/GLOB SERPL: 1.1 RATIO (ref 1.1–2)
ALP SERPL-CCNC: 111 UNIT/L (ref 40–150)
ALT SERPL-CCNC: 20 UNIT/L (ref 0–55)
ANION GAP SERPL CALC-SCNC: 11 MEQ/L
AST SERPL-CCNC: 23 UNIT/L (ref 11–45)
BACTERIA #/AREA URNS AUTO: ABNORMAL /HPF
BASOPHILS # BLD AUTO: 0.02 X10(3)/MCL
BASOPHILS NFR BLD AUTO: 0.6 %
BILIRUB SERPL-MCNC: 0.6 MG/DL
BILIRUB UR QL STRIP.AUTO: NEGATIVE
BUN SERPL-MCNC: 40.5 MG/DL (ref 8.4–25.7)
CALCIUM SERPL-MCNC: 9.5 MG/DL (ref 8.8–10)
CHLORIDE SERPL-SCNC: 113 MMOL/L (ref 98–107)
CLARITY UR: CLEAR
CO2 SERPL-SCNC: 17 MMOL/L (ref 23–31)
COLOR UR AUTO: ABNORMAL
CREAT SERPL-MCNC: 1.75 MG/DL (ref 0.72–1.25)
CREAT/UREA NIT SERPL: 23
EOSINOPHIL # BLD AUTO: 0.2 X10(3)/MCL (ref 0–0.9)
EOSINOPHIL NFR BLD AUTO: 6.2 %
ERYTHROCYTE [DISTWIDTH] IN BLOOD BY AUTOMATED COUNT: 13.3 % (ref 11.5–17)
GFR SERPLBLD CREATININE-BSD FMLA CKD-EPI: 38 ML/MIN/1.73/M2
GLOBULIN SER-MCNC: 3.6 GM/DL (ref 2.4–3.5)
GLUCOSE SERPL-MCNC: 95 MG/DL (ref 82–115)
GLUCOSE UR QL STRIP: NORMAL
HCT VFR BLD AUTO: 37.6 % (ref 42–52)
HGB BLD-MCNC: 11.9 G/DL (ref 14–18)
HGB UR QL STRIP: NEGATIVE
HOLD SPECIMEN: NORMAL
HOLD SPECIMEN: NORMAL
HYALINE CASTS #/AREA URNS LPF: ABNORMAL /LPF
IMM GRANULOCYTES # BLD AUTO: 0.01 X10(3)/MCL (ref 0–0.04)
IMM GRANULOCYTES NFR BLD AUTO: 0.3 %
KETONES UR QL STRIP: NEGATIVE
LEUKOCYTE ESTERASE UR QL STRIP: NEGATIVE
LYMPHOCYTES # BLD AUTO: 0.95 X10(3)/MCL (ref 0.6–4.6)
LYMPHOCYTES NFR BLD AUTO: 29.5 %
MCH RBC QN AUTO: 29.5 PG (ref 27–31)
MCHC RBC AUTO-ENTMCNC: 31.6 G/DL (ref 33–36)
MCV RBC AUTO: 93.1 FL (ref 80–94)
MONOCYTES # BLD AUTO: 0.29 X10(3)/MCL (ref 0.1–1.3)
MONOCYTES NFR BLD AUTO: 9 %
MUCOUS THREADS URNS QL MICRO: ABNORMAL /LPF
NEUTROPHILS # BLD AUTO: 1.75 X10(3)/MCL (ref 2.1–9.2)
NEUTROPHILS NFR BLD AUTO: 54.4 %
NITRITE UR QL STRIP: NEGATIVE
NRBC BLD AUTO-RTO: 0 %
PH UR STRIP: 5 [PH]
PLATELET # BLD AUTO: 206 X10(3)/MCL (ref 130–400)
PMV BLD AUTO: 10.3 FL (ref 7.4–10.4)
POCT GLUCOSE: 118 MG/DL (ref 70–110)
POTASSIUM SERPL-SCNC: 4.4 MMOL/L (ref 3.5–5.1)
PROT SERPL-MCNC: 7.6 GM/DL (ref 5.8–7.6)
PROT UR QL STRIP: ABNORMAL
RBC # BLD AUTO: 4.04 X10(6)/MCL (ref 4.7–6.1)
RBC #/AREA URNS AUTO: ABNORMAL /HPF
SODIUM SERPL-SCNC: 141 MMOL/L (ref 136–145)
SP GR UR STRIP.AUTO: 1.02 (ref 1–1.03)
SQUAMOUS #/AREA URNS LPF: ABNORMAL /HPF
UROBILINOGEN UR STRIP-ACNC: NORMAL
WBC # BLD AUTO: 3.22 X10(3)/MCL (ref 4.5–11.5)
WBC #/AREA URNS AUTO: ABNORMAL /HPF

## 2025-06-14 PROCEDURE — 99284 EMERGENCY DEPT VISIT MOD MDM: CPT | Mod: 25

## 2025-06-14 PROCEDURE — 96360 HYDRATION IV INFUSION INIT: CPT

## 2025-06-14 PROCEDURE — 93005 ELECTROCARDIOGRAM TRACING: CPT

## 2025-06-14 PROCEDURE — 85025 COMPLETE CBC W/AUTO DIFF WBC: CPT

## 2025-06-14 PROCEDURE — 82962 GLUCOSE BLOOD TEST: CPT

## 2025-06-14 PROCEDURE — 63600175 PHARM REV CODE 636 W HCPCS

## 2025-06-14 PROCEDURE — 80053 COMPREHEN METABOLIC PANEL: CPT

## 2025-06-14 PROCEDURE — 81001 URINALYSIS AUTO W/SCOPE: CPT

## 2025-06-14 RX ADMIN — SODIUM CHLORIDE, POTASSIUM CHLORIDE, SODIUM LACTATE AND CALCIUM CHLORIDE 1000 ML: 600; 310; 30; 20 INJECTION, SOLUTION INTRAVENOUS at 11:06

## 2025-06-14 NOTE — ED PROVIDER NOTES
Encounter Date: 6/14/2025       History     Chief Complaint   Patient presents with    Fatigue     Pt in with complaints of weakness and dizziness that started yesterday at about noon.       HPI  Norma Roman is a 85 y.o. M, w/PMHx of HTN, HLD, GERD, AAA s/p repair 2020,  who presents to [facility] ED with primary complaint of weakness. Symptom onset was yesterday at around 1100 while he was out in his backyard getting tool supplies to fix his trunk. He stated his whole body started to weak compared to his baseline along with fatigue. He states has been tolerating PO diet without nausea/vomiting, diarrhea, SOB, and denies any recent sick contacts.     Review of patient's allergies indicates:  No Known Allergies  Past Medical History:   Diagnosis Date    Cancer     HLD (hyperlipidemia)     Hypertension      Past Surgical History:   Procedure Laterality Date    ABDOMINAL AORTIC ANEURYSM REPAIR      LEFT HEART CATHETERIZATION Left 11/11/2022    Procedure: CATHETERIZATION, HEART, LEFT;  Surgeon: Colby Cook MD;  Location: [facility] CATH LAB;  Service: Cardiology;  Laterality: Left;  LHC +/- PCI    NEPHRECTOMY Right      No family history on file.  Social History[1]  Review of Systems   Constitutional:  Positive for fatigue. Negative for activity change, appetite change and fever.   HENT:  Negative for sore throat.    Respiratory:  Negative for cough, chest tightness and shortness of breath.    Cardiovascular:  Negative for chest pain and palpitations.   Gastrointestinal:  Negative for nausea.   Genitourinary:  Negative for dysuria.   Musculoskeletal:  Negative for back pain.   Skin:  Negative for rash.   Neurological:  Positive for weakness and headaches. Negative for tremors.   Hematological:  Does not bruise/bleed easily.       Physical Exam     Initial Vitals [06/14/25 1129]   BP Pulse Resp Temp SpO2   92/69 78 14 98.1 °F (36.7 °C) 96 %      MAP       --         Physical Exam    Nursing note and vitals  reviewed.  Constitutional: He appears well-developed and well-nourished.   HENT:   Head: Normocephalic and atraumatic.   Eyes: Conjunctivae and EOM are normal. Pupils are equal, round, and reactive to light.   Neck: Neck supple.   Normal range of motion.  Cardiovascular:  Normal rate.           No murmur heard.  Pulmonary/Chest: Breath sounds normal. He has no wheezes. He exhibits no tenderness.   Abdominal: Abdomen is soft. Bowel sounds are normal. He exhibits no distension and no mass. There is no abdominal tenderness. There is no rebound and no guarding.   Musculoskeletal:         General: Normal range of motion.      Cervical back: Normal range of motion and neck supple.     Neurological: He is alert and oriented to person, place, and time. No cranial nerve deficit.   Skin: Skin is warm and dry. Capillary refill takes less than 2 seconds.         ED Course   Procedures  Labs Reviewed   COMPREHENSIVE METABOLIC PANEL - Abnormal       Result Value    Sodium 141      Potassium 4.4      Chloride 113 (*)     CO2 17 (*)     Glucose 95      Blood Urea Nitrogen 40.5 (*)     Creatinine 1.75 (*)     Calcium 9.5      Protein Total 7.6      Albumin 4.0      Globulin 3.6 (*)     Albumin/Globulin Ratio 1.1      Bilirubin Total 0.6            ALT 20      AST 23      eGFR 38      Anion Gap 11.0      BUN/Creatinine Ratio 23     URINALYSIS, REFLEX TO URINE CULTURE - Abnormal    Color, UA Light-Yellow      Appearance, UA Clear      Specific Gravity, UA 1.024      pH, UA 5.0      Protein, UA Trace (*)     Glucose, UA Normal      Ketones, UA Negative      Blood, UA Negative      Bilirubin, UA Negative      Urobilinogen, UA Normal      Nitrites, UA Negative      Leukocyte Esterase, UA Negative      RBC, UA 0-5      WBC, UA None Seen      Bacteria, UA None Seen      Squamous Epithelial Cells, UA Trace (*)     Mucous, UA Trace (*)     Hyaline Casts, UA None Seen     CBC WITH DIFFERENTIAL - Abnormal    WBC 3.22 (*)     RBC 4.04  (*)     Hgb 11.9 (*)     Hct 37.6 (*)     MCV 93.1      MCH 29.5      MCHC 31.6 (*)     RDW 13.3      Platelet 206      MPV 10.3      Neut % 54.4      Lymph % 29.5      Mono % 9.0      Eos % 6.2      Basophil % 0.6      Imm Grans % 0.3      Neut # 1.75 (*)     Lymph # 0.95      Mono # 0.29      Eos # 0.20      Baso # 0.02      Imm Gran # 0.01      NRBC% 0.0     POCT GLUCOSE - Abnormal    POCT Glucose 118 (*)    CBC W/ AUTO DIFFERENTIAL    Narrative:     The following orders were created for panel order CBC Auto Differential.  Procedure                               Abnormality         Status                     ---------                               -----------         ------                     CBC with Differential[[id number]]       Abnormal            Final result                 Please view results for these tests on the individual orders.   EXTRA TUBES    Narrative:     The following orders were created for panel order EXTRA TUBES.  Procedure                               Abnormality         Status                     ---------                               -----------         ------                     Light Blue Top Hold[[id number]]                             Final result               Gold Top Hold[[id number]]                                   Final result                 Please view results for these tests on the individual orders.   LIGHT BLUE TOP HOLD    Extra Tube Hold for add-ons.     GOLD TOP HOLD    Extra Tube Hold for add-ons.          ECG Results              EKG 12-lead (Syncope) Age >50 (Preliminary result)        Collection Time Result Time QRS Duration [facility] QTC Calculation    06/14/25 11:31:32 06/14/25 13:04:53 90 426                     Wet Read by Sirisha Carey MD (06/14/25 13:05:03, [facility], Emergency Medicine)    EKG Initial Reading: Independently Interpreted by Sirisha Carey MD. independently as: No STEMI. Rhythm: Normal Sinus Rhythm, Rate 78. Ectopy: No  Ectopy. Conduction: Normal. ST Segments: Normal ST Segments. T Waves: Normal. Axis: Normal.                          In process by Interface, Lab In [facility] (06/14/25 11:37:58)                   Narrative:    Test Reason : R55,    Vent. Rate :  78 BPM     Atrial Rate :  78 BPM     P-R Int : 154 ms          QRS Dur :  90 ms      QT Int : 374 ms       P-R-T Axes :  74  68  62 degrees    QTcB Int : 426 ms    Normal sinus rhythm  Normal ECG  When compared with ECG of 20-Feb-2025 10:56,  No significant change was found    Referred By:            Confirmed By:                                   Imaging Results    None          Medications   lactated ringers bolus 1,000 mL (0 mLs Intravenous Stopped 6/14/25 1252)     Medical Decision Making  85-year-old male who presents to ED with for fatigue/weakness.  Orthostatics was positive on arrival.  Administered 1 L bolus of lactated Ringer's which improved his blood pressure is systolic from 80s/50s to 120s/70s.  Orthostatics improved after fluid resuscitation.  Lab works was significant for acute DANDRE.  Inform patient to increase fluid intake to at least 2 L daily.  Patient voiced understanding.  ED precautions advised the patient in case of worsening weakness, fatigue, lightheadedness, headache, chest palpitations.  Patient is stable for discharge with close PCP follow up.    Amount and/or Complexity of Data Reviewed  Labs: ordered.              Attending Attestation:   Physician Attestation Statement for Resident:  As the supervising MD   Physician Attestation Statement: I have personally seen and examined this patient.   I agree with the above history.  -: Mainly only complaint patient has was generalized weakness, denies any other complaints whatsoever.   As the supervising MD I agree with the above PE.   -: Patient was found to be orthostatic, decided to give him some IV fluids and re-evaluate,   As the supervising MD I agree with the above treatment, course, plan, and  disposition.   -: Patient did have some dehydration on blood work, decided to give him IV fluids, and re-evaluate, his orthostatic resolved, actually his blood pressure went up to 140 systolic from lying to standing up, decided that we are going to let him go home with instruction to keep his appointment with his family doctor, and to monitor vital signs.  Patient walked out of the emergency room in a stable gait.                   ED Course as of 06/14/25 1524   Sat Jun 14, 2025   1234 Orthostatics from sitting to standing positive prior to 1 L bolus of fluid. [MARIN]   1234 Ordered CMP and CBC that was only remarkable for elevated creatinine of 1.75 compared to baseline of 1.30 [MARIN]   1300 Administered 1 L bolus of which we increased his systolic blood pressure from 86/58 to 120/70s. [MARIN]   1323 BP(!): 140/76 [GQ]   1323 Pulse: 62  Patient's repeat orthostatics shows that when he stands up his blood pressure goes up to actually 140s systolic now. [GQ]   1328 Patient is no longer weak and lightheaded; feels better compared to initial ED arrival [MARIN]      ED Course User Index  [GQ] Sirisha Carey MD  [MARIN] Irineo Stewart MD                           Clinical Impression:  Final diagnoses:  [R55] Syncope  [N17.9] DANDRE (acute kidney injury) (Primary)  [I95.1] Orthostatic hypotension  [E86.0] Dehydration          ED Disposition Condition    Discharge Stable          ED Prescriptions    None       Follow-up Information       Follow up With Specialties Details Why Contact Info    Chucky Logan MD Family Medicine Go to  ED follow up [address] [facility] [address] [phone]      [facility] Emergency Medicine Go to  If symptoms worsen [address] [facility] [address] [phone]                 Irineo Stewart MD  Resident  06/14/25 1332         [1]   Social History  Tobacco Use    Smoking status: Never    Smokeless tobacco: Never   Substance Use Topics     Alcohol use: Never    Drug use: Never        Sirisha Carey MD  06/14/25 1521

## 2025-06-16 LAB
OHS QRS DURATION: 90 MS
OHS QTC CALCULATION: 426 MS

## 2025-06-19 NOTE — Clinical Note
A percutaneous stick to the right radial artery was performed. Ultrasound guidance was used to obtain access.
A pre-sedation assessment was completed by the physician immediately prior to sedation start. 
A pulse oximeter was placed on the patient's left index finger and left index finger.
All catheters were removed. 
All wires were removed. 
An airway assessment has been completed by Dr. Cook.
ID band present and verified.
Phone report was given to LUCRECIA Moore
The DP pulses were 2+ bilaterally. The left radial pulse was 2+. The right radial pulse was +2 and allens test negative.
The ECG showed sinus bradycardia.
The ECG showed sinus bradycardia.
The catheter was inserted into the right subclavian artery.
The catheter was repositioned into the left ventricle. Hemodynamics were performed.  and Pullback was recorded.
The catheter was repositioned into the ostium   left main. An angiography was performed of the left coronary arteries. Multiple views were taken. The angiography was performed via power injection.
The catheter was repositioned into the ostium   right coronary artery. An angiography was performed of the right coronary arteries. Multiple views were taken. The angiography was performed via power injection.
The groin and right radial was prepped. The site was prepped with ChloraPrep. The site was clipped. The patient was draped. The patient was positioned supine. The patient was secured to an armboard.
The grounding pads were placed on the patient's chest.
The procedural consent was signed. A history and physical note was completed in the chart.
The radial band was applied to the right radial artery. 13 cc's of air were inserted into the closure device.
The radial pulses were +2 bilaterally.
The sheath was inserted into the right radial artery.
The wire was exchanged and placed in the right subclavian artery.
The wire was inserted into the aorta.
dry, intact, no bleeding and no hematoma.
Patient is here for follow up, patient is seeing improvement with topical steroids and plaquenil. Patient is tolerating plaquenil and methotrexate. Labs are being monitored by Dr. Bruce. Clobetasol refills are not needed at this time.
Detail Level: Zone
Being treated by Dr. Rousseau with plaquenil.

## 2025-07-20 ENCOUNTER — HOSPITAL ENCOUNTER (EMERGENCY)
Facility: HOSPITAL | Age: 85
Discharge: HOME | End: 2025-07-20
Attending: EMERGENCY MEDICINE
Payer: MEDICARE

## 2025-07-20 VITALS
SYSTOLIC BLOOD PRESSURE: 131 MMHG | TEMPERATURE: 98 F | WEIGHT: 154 LBS | OXYGEN SATURATION: 100 % | HEIGHT: 68 IN | HEART RATE: 74 BPM | DIASTOLIC BLOOD PRESSURE: 78 MMHG | BODY MASS INDEX: 23.34 KG/M2 | RESPIRATION RATE: 17 BRPM

## 2025-07-20 DIAGNOSIS — E86.0 DEHYDRATION: Primary | ICD-10-CM

## 2025-07-20 DIAGNOSIS — Z13.9 SCREENING DUE: ICD-10-CM

## 2025-07-20 DIAGNOSIS — R53.1 ASTHENIA: ICD-10-CM

## 2025-07-20 DIAGNOSIS — R53.1 WEAKNESS: ICD-10-CM

## 2025-07-20 DIAGNOSIS — E86.1 HYPOVOLEMIA: ICD-10-CM

## 2025-07-20 LAB
ALBUMIN SERPL-MCNC: 3.8 G/DL (ref 3.4–4.8)
ALBUMIN/GLOB SERPL: 1.1 RATIO (ref 1.1–2)
ALP SERPL-CCNC: 101 UNIT/L (ref 40–150)
ALT SERPL-CCNC: 14 UNIT/L (ref 0–55)
ANION GAP SERPL CALC-SCNC: 9 MEQ/L
AST SERPL-CCNC: 21 UNIT/L (ref 11–45)
BACTERIA #/AREA URNS AUTO: ABNORMAL /HPF
BASOPHILS # BLD AUTO: 0 X10(3)/MCL
BASOPHILS NFR BLD AUTO: 0 %
BILIRUB SERPL-MCNC: 0.4 MG/DL
BILIRUB UR QL STRIP.AUTO: NEGATIVE
BUN SERPL-MCNC: 56.8 MG/DL (ref 8.4–25.7)
CALCIUM SERPL-MCNC: 8.9 MG/DL (ref 8.8–10)
CHLORIDE SERPL-SCNC: 115 MMOL/L (ref 98–107)
CK SERPL-CCNC: 199 U/L (ref 30–200)
CLARITY UR: CLEAR
CO2 SERPL-SCNC: 16 MMOL/L (ref 23–31)
COLOR UR AUTO: ABNORMAL
CREAT SERPL-MCNC: 2.07 MG/DL (ref 0.72–1.25)
CREAT/UREA NIT SERPL: 27
EOSINOPHIL # BLD AUTO: 0.11 X10(3)/MCL (ref 0–0.9)
EOSINOPHIL NFR BLD AUTO: 3.3 %
ERYTHROCYTE [DISTWIDTH] IN BLOOD BY AUTOMATED COUNT: 13.3 % (ref 11.5–17)
GFR SERPLBLD CREATININE-BSD FMLA CKD-EPI: 31 ML/MIN/1.73/M2
GLOBULIN SER-MCNC: 3.4 GM/DL (ref 2.4–3.5)
GLUCOSE SERPL-MCNC: 108 MG/DL (ref 82–115)
GLUCOSE UR QL STRIP: NORMAL
HCT VFR BLD AUTO: 33.6 % (ref 42–52)
HGB BLD-MCNC: 10.5 G/DL (ref 14–18)
HGB UR QL STRIP: NEGATIVE
HOLD SPECIMEN: NORMAL
HYALINE CASTS #/AREA URNS LPF: ABNORMAL /LPF
IMM GRANULOCYTES # BLD AUTO: 0.01 X10(3)/MCL (ref 0–0.04)
IMM GRANULOCYTES NFR BLD AUTO: 0.3 %
KETONES UR QL STRIP: NEGATIVE
LACTATE SERPL-SCNC: 1.2 MMOL/L (ref 0.5–2.2)
LEUKOCYTE ESTERASE UR QL STRIP: NEGATIVE
LYMPHOCYTES # BLD AUTO: 0.77 X10(3)/MCL (ref 0.6–4.6)
LYMPHOCYTES NFR BLD AUTO: 23.3 %
MAGNESIUM SERPL-MCNC: 2.5 MG/DL (ref 1.6–2.6)
MCH RBC QN AUTO: 29.3 PG (ref 27–31)
MCHC RBC AUTO-ENTMCNC: 31.3 G/DL (ref 33–36)
MCV RBC AUTO: 93.9 FL (ref 80–94)
MONOCYTES # BLD AUTO: 0.43 X10(3)/MCL (ref 0.1–1.3)
MONOCYTES NFR BLD AUTO: 13 %
MUCOUS THREADS URNS QL MICRO: ABNORMAL /LPF
NEUTROPHILS # BLD AUTO: 1.98 X10(3)/MCL (ref 2.1–9.2)
NEUTROPHILS NFR BLD AUTO: 60.1 %
NITRITE UR QL STRIP: NEGATIVE
NRBC BLD AUTO-RTO: 0 %
PH UR STRIP: 5.5 [PH]
PLATELET # BLD AUTO: 187 X10(3)/MCL (ref 130–400)
PMV BLD AUTO: 10.7 FL (ref 7.4–10.4)
POTASSIUM SERPL-SCNC: 4.6 MMOL/L (ref 3.5–5.1)
PROT SERPL-MCNC: 7.2 GM/DL (ref 5.8–7.6)
PROT UR QL STRIP: ABNORMAL
RBC # BLD AUTO: 3.58 X10(6)/MCL (ref 4.7–6.1)
RBC #/AREA URNS AUTO: ABNORMAL /HPF
SODIUM SERPL-SCNC: 140 MMOL/L (ref 136–145)
SP GR UR STRIP.AUTO: 1.02 (ref 1–1.03)
SQUAMOUS #/AREA URNS LPF: ABNORMAL /HPF
TROPONIN I SERPL-MCNC: 0.02 NG/ML (ref 0–0.04)
UROBILINOGEN UR STRIP-ACNC: NORMAL
WBC # BLD AUTO: 3.3 X10(3)/MCL (ref 4.5–11.5)
WBC #/AREA URNS AUTO: ABNORMAL /HPF

## 2025-07-20 PROCEDURE — 82550 ASSAY OF CK (CPK): CPT | Performed by: EMERGENCY MEDICINE

## 2025-07-20 PROCEDURE — 84484 ASSAY OF TROPONIN QUANT: CPT | Performed by: EMERGENCY MEDICINE

## 2025-07-20 PROCEDURE — 80053 COMPREHEN METABOLIC PANEL: CPT | Performed by: EMERGENCY MEDICINE

## 2025-07-20 PROCEDURE — 93005 ELECTROCARDIOGRAM TRACING: CPT

## 2025-07-20 PROCEDURE — 83735 ASSAY OF MAGNESIUM: CPT | Performed by: EMERGENCY MEDICINE

## 2025-07-20 PROCEDURE — 87040 BLOOD CULTURE FOR BACTERIA: CPT | Performed by: EMERGENCY MEDICINE

## 2025-07-20 PROCEDURE — 96360 HYDRATION IV INFUSION INIT: CPT

## 2025-07-20 PROCEDURE — 25000003 PHARM REV CODE 250: Performed by: EMERGENCY MEDICINE

## 2025-07-20 PROCEDURE — 85025 COMPLETE CBC W/AUTO DIFF WBC: CPT | Performed by: EMERGENCY MEDICINE

## 2025-07-20 PROCEDURE — 83605 ASSAY OF LACTIC ACID: CPT | Performed by: EMERGENCY MEDICINE

## 2025-07-20 PROCEDURE — 81001 URINALYSIS AUTO W/SCOPE: CPT | Performed by: EMERGENCY MEDICINE

## 2025-07-20 PROCEDURE — 99285 EMERGENCY DEPT VISIT HI MDM: CPT | Mod: 25

## 2025-07-20 RX ADMIN — SODIUM CHLORIDE 1000 ML: 9 INJECTION, SOLUTION INTRAVENOUS at 01:07

## 2025-07-20 NOTE — ED PROVIDER NOTES
"Encounter Date: 7/20/2025       History     Chief Complaint   Patient presents with    Weakness     Presents from home with c/o increasing weakness x2 days, worse with exertion. No CP, no SOB, med compliant. Also states "My blood pressure has been going up and down." /58 in Triage.     85-year-old male presents to the emergency department endorsing generalized weakness, mild nausea.  Reports this has been ongoing for 3 days.  Patient denying  vomiting, fever, chills, urinary symptoms.  Patient reports he intermittently has similar symptoms, sometimes attributed to UTI.  Patient is alert, oriented, verbal and cooperative in the ED. blood pressures today modestly low on arrival, mildly bradycardic.        Review of patient's allergies indicates:  No Known Allergies  Past Medical History:   Diagnosis Date    Cancer     HLD (hyperlipidemia)     Hypertension      Past Surgical History:   Procedure Laterality Date    ABDOMINAL AORTIC ANEURYSM REPAIR      LEFT HEART CATHETERIZATION Left 11/11/2022    Procedure: CATHETERIZATION, HEART, LEFT;  Surgeon: Colby Cook MD;  Location: [facility] CATH LAB;  Service: Cardiology;  Laterality: Left;  LHC +/- PCI    NEPHRECTOMY Right      No family history on file.  Social History[1]  Review of Systems    Physical Exam     Initial Vitals [07/20/25 1223]   BP Pulse Resp Temp SpO2   (!) 103/58 60 18 98.4 °F (36.9 °C) 98 %      MAP       --         Physical Exam    Nursing note and vitals reviewed.  Constitutional: He appears well-developed and well-nourished. He is not diaphoretic. No distress.   HENT:   Head: Normocephalic and atraumatic.   Eyes: EOM are normal. Pupils are equal, round, and reactive to light. Right eye exhibits no discharge. Left eye exhibits no discharge.   Neck: Neck supple. No thyromegaly present. No tracheal deviation present. No JVD present.   Normal range of motion.  Cardiovascular:  Normal rate, regular rhythm, normal heart sounds and intact distal pulses.    "        No murmur heard.  Pulmonary/Chest: Breath sounds normal. No stridor. No respiratory distress. He has no wheezes. He has no rhonchi. He has no rales.   Abdominal: Abdomen is soft. He exhibits no distension. There is no abdominal tenderness. There is no rebound and no guarding.   Musculoskeletal:         General: No tenderness or edema. Normal range of motion.      Cervical back: Normal range of motion and neck supple.     Neurological: He is alert and oriented to person, place, and time. He has normal strength. No cranial nerve deficit. GCS score is 15. GCS eye subscore is 4. GCS verbal subscore is 5. GCS motor subscore is 6.   Skin: Skin is warm and dry. Capillary refill takes less than 2 seconds. No rash and no abscess noted. No erythema. No pallor.   Psychiatric: He has a normal mood and affect. His behavior is normal. Judgment and thought content normal.         ED Course   Procedures  Labs Reviewed   COMPREHENSIVE METABOLIC PANEL - Abnormal       Result Value    Sodium 140      Potassium 4.6      Chloride 115 (*)     CO2 16 (*)     Glucose 108      Blood Urea Nitrogen 56.8 (*)     Creatinine 2.07 (*)     Calcium 8.9      Protein Total 7.2      Albumin 3.8      Globulin 3.4      Albumin/Globulin Ratio 1.1      Bilirubin Total 0.4            ALT 14      AST 21      eGFR 31      Anion Gap 9.0      BUN/Creatinine Ratio 27     URINALYSIS, REFLEX TO URINE CULTURE - Abnormal    Color, UA Light-Yellow      Appearance, UA Clear      Specific Gravity, UA 1.021      pH, UA 5.5      Protein, UA Trace (*)     Glucose, UA Normal      Ketones, UA Negative      Blood, UA Negative      Bilirubin, UA Negative      Urobilinogen, UA Normal      Nitrites, UA Negative      Leukocyte Esterase, UA Negative      RBC, UA 0-5      WBC, UA 0-5      Bacteria, UA None Seen      Squamous Epithelial Cells, UA None Seen      Mucous, UA Trace (*)     Hyaline Casts, UA 0-2 (*)    CBC WITH DIFFERENTIAL - Abnormal    WBC 3.30 (*)      RBC 3.58 (*)     Hgb 10.5 (*)     Hct 33.6 (*)     MCV 93.9      MCH 29.3      MCHC 31.3 (*)     RDW 13.3      Platelet 187      MPV 10.7 (*)     Neut % 60.1      Lymph % 23.3      Mono % 13.0      Eos % 3.3      Basophil % 0.0      Imm Grans % 0.3      Neut # 1.98 (*)     Lymph # 0.77      Mono # 0.43      Eos # 0.11      Baso # 0.00      Imm Gran # 0.01      NRBC% 0.0     MAGNESIUM - Normal    Magnesium Level 2.50     TROPONIN I - Normal    Troponin-I 0.022     LACTIC ACID, PLASMA - Normal    Lactic Acid Level 1.2     CK - Normal    Creatine Kinase 199     BLOOD CULTURE [facility]   BLOOD CULTURE [facility]   CBC W/ AUTO DIFFERENTIAL    Narrative:     The following orders were created for panel order CBC auto differential.  Procedure                               Abnormality         Status                     ---------                               -----------         ------                     CBC with Differential[[id number]]       Abnormal            Final result                 Please view results for these tests on the individual orders.   EXTRA TUBES    Narrative:     The following orders were created for panel order EXTRA TUBES.  Procedure                               Abnormality         Status                     ---------                               -----------         ------                     Light Blue Top Hold[[id number]]                             Final result               Red Top Hold[[id number]]                                    Final result               Light Green Top Hold[[id number]]                            Final result               Lavender Top Hold[[id number]]                               Final result               Gold Top Hold[[id number]]                                   Final result               Pink Top Hold[[id number]]                                   Final result                 Please view results for these tests on the individual orders.   LIGHT BLUE TOP HOLD    Extra Tube Hold for  add-ons.     RED TOP HOLD    Extra Tube Hold for add-ons.     LIGHT GREEN TOP HOLD    Extra Tube Hold for add-ons.     LAVENDER TOP HOLD    Extra Tube Hold for add-ons.     GOLD TOP HOLD    Extra Tube Hold for add-ons.     PINK TOP HOLD    Extra Tube Hold for add-ons.          ECG Results              EKG 12-lead (Weakness) Age > 50 (In process)        Collection Time Result Time QRS Duration [facility] QTC Calculation    07/20/25 12:21:28 07/20/25 12:32:12 96 376                     In process by Interface, Lab In [facility] (07/20/25 12:32:19)                   Narrative:    Test Reason : R53.1,    Vent. Rate :  56 BPM     Atrial Rate :  56 BPM     P-R Int : 174 ms          QRS Dur :  96 ms      QT Int : 390 ms       P-R-T Axes :  74  70  65 degrees    QTcB Int : 376 ms    Sinus bradycardia  Otherwise normal ECG  When compared with ECG of 14-Jun-2025 11:31,  No significant change was found    Referred By: AAAREFERRAL SELF           Confirmed By:                                   Imaging Results              X-Ray Chest AP Portable (Final result)  Result time 07/20/25 14:21:56      Final result by Iain Bueno MD (07/20/25 14:21:56)                   Impression:      No acute cardiopulmonary process identified.      Electronically signed by: Iain Bueno  Date:    07/20/2025  Time:    14:21               Narrative:    EXAMINATION:  XR CHEST AP PORTABLE    CLINICAL HISTORY:  Weakness    TECHNIQUE:  One    COMPARISON:  February 15, 2025.    FINDINGS:  Cardiopericardial silhouette is within normal limits. Lungs are without dense focal or segmental consolidation, congestive process, pleural effusions or pneumothorax.                                       Medications   sodium chloride 0.9% bolus 1,000 mL 1,000 mL (0 mLs Intravenous Stopped 7/20/25 1427)     Medical Decision Making  Amount and/or Complexity of Data Reviewed  Labs: ordered. Decision-making details documented in ED Course.  Radiology: ordered and independent  interpretation performed. Decision-making details documented in ED Course.  ECG/medicine tests: ordered and independent interpretation performed. Decision-making details documented in ED Course.     Details: Sinus rhythm at 56 per minute, nonacute and nonischemic appearing;    Risk  Risk Details: Risk found sufficient to warrant expanded evaluation with objective data.  Data resulted in found generally reassuring.  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  Symptoms are improved with crystalloid challenge.  Patient is discharged in stable condition with return precautions, follow up instructions.               ED Course as of 07/20/25 1627   Sun Jul 20, 2025   1359 Reassuring hemogram; [CT]   1509 Normal magnesium; [CT]   1509 Normal lactate; [CT]   1509 Chemistries compatible with mild DANDRE superimposed on CKD, creatinine 2.07 BUN 57, bicarb 16; electrolytes remain normally regulated, normal anion gap; [CT]   1510 No acute abnormal on chest x-ray; [CT]      ED Course User Index  [CT] Franco Castillo MD                               Clinical Impression:  Final diagnoses:  [R53.1] Asthenia  [Z13.9] Screening due  [E86.0] Dehydration (Primary)  [E86.1] Hypovolemia          ED Disposition Condition    Discharge Stable          ED Prescriptions    None       Follow-up Information       Follow up With Specialties Details Why Contact Info    [facility] Emergency Dept Emergency Medicine  As needed, If symptoms worsen [address] [facility] [place] [address] [phone]    Chucky Logan MD Family Medicine Call   [address] [facility] [id number] [phone]                     [1]   Social History  Tobacco Use    Smoking status: Never    Smokeless tobacco: Never   Substance Use Topics    Alcohol use: Never    Drug use: Never        Franco Castillo MD  07/20/25 1621

## 2025-07-22 LAB
OHS QRS DURATION: 96 MS
OHS QTC CALCULATION: 376 MS

## 2025-07-25 LAB
BACTERIA BLD CULT: NORMAL
BACTERIA BLD CULT: NORMAL

## (undated) DEVICE — KIT GLIDESHEATH SLEND 6FR 10CM

## (undated) DEVICE — CATH OPTITORQUE RADIAL 5FR

## (undated) DEVICE — PAD DEFIB CADENCE ADULT R2

## (undated) DEVICE — GUIDE WIRE J-TIP 260CM .025

## (undated) DEVICE — Device

## (undated) DEVICE — BAND TR COMP DEVICE REG 24CM

## (undated) DEVICE — CANNULA NASAL ADULT

## (undated) DEVICE — SET ANGIO ACIST CVI ANGIOTOUCH

## (undated) DEVICE — GUIDEWIRE ANGIO 1.5MM .035X180